# Patient Record
Sex: MALE | Race: WHITE | URBAN - METROPOLITAN AREA
[De-identification: names, ages, dates, MRNs, and addresses within clinical notes are randomized per-mention and may not be internally consistent; named-entity substitution may affect disease eponyms.]

---

## 2015-11-30 LAB
ALT SERPL-CCNC: 10 IU/L (ref 8–45)
AST SERPL-CCNC: 15 IU/L (ref 2–40)
CREAT SERPL-MCNC: 1.07 MG/DL (ref 0.72–1.25)
GFR SERPL CREATININE-BSD FRML MDRD: >60 ML/MIN/1.73M2
GLUCOSE SERPL-MCNC: 97 MG/DL (ref 65–100)
INR PPP: 1
POTASSIUM SERPL-SCNC: 3.8 MMOL/L (ref 3.5–5)

## 2017-01-09 ENCOUNTER — TRANSFERRED RECORDS (OUTPATIENT)
Dept: HEALTH INFORMATION MANAGEMENT | Facility: CLINIC | Age: 82
End: 2017-01-09

## 2017-01-09 LAB
ALT SERPL-CCNC: 9 IU/L (ref 8–45)
AST SERPL-CCNC: 18 IU/L (ref 2–40)
CREAT SERPL-MCNC: 1.15 MG/DL (ref 0.72–1.25)
GFR SERPL CREATININE-BSD FRML MDRD: >60 ML/MIN/1.73M2
GLUCOSE SERPL-MCNC: 101 MG/DL (ref 65–100)
POTASSIUM SERPL-SCNC: 4.4 MMOL/L (ref 3.5–5)

## 2017-05-25 ENCOUNTER — OFFICE VISIT (OUTPATIENT)
Dept: SURGERY | Facility: CLINIC | Age: 82
End: 2017-05-25
Payer: COMMERCIAL

## 2017-05-25 VITALS
BODY MASS INDEX: 20.76 KG/M2 | HEART RATE: 65 BPM | HEIGHT: 70 IN | DIASTOLIC BLOOD PRESSURE: 62 MMHG | SYSTOLIC BLOOD PRESSURE: 119 MMHG | WEIGHT: 145 LBS

## 2017-05-25 DIAGNOSIS — K40.91 UNILATERAL RECURRENT INGUINAL HERNIA WITHOUT OBSTRUCTION OR GANGRENE: Primary | ICD-10-CM

## 2017-05-25 PROCEDURE — 99203 OFFICE O/P NEW LOW 30 MIN: CPT | Performed by: SURGERY

## 2017-05-25 NOTE — LETTER
May 25, 2017      RE:  Joseluis Falcon-:  32    Joseluis Falcon comes to see me today per reference of Dr. Diop.  This delightful active 84-year-old patient underwent successful right  Hernia repair by Dr. Rasmussen on 2015.  He noted shortly thereafter a progressively increasing left inguinal hernia.  This has never been incarcerated but only reduces when he is laying down with his legs elevated.  He does have some discomfort at the site occasionally with no evidence of obstruction.  He's had no problems with the right hernia surgical site.     PMH: Allergies: Seasonal. Protonix  Medications: Prednisone (  For psoriasis) and Otezla  Medical: Degenerative low back pain  GERD with Carlson's  Psoriasis  Some hearing loss  Surgical: Right inguinal hernia with mesh repair 2015  Excision of a skin cancer on his neck.      25-pack-year smoking history quitting in      Lives independently with his wife who is quite frail.  Intentional significant weight loss.     Exam: Talkative  very delightful elderly patient. Normal affect.  Glasses  Blood pressure 119/62.  Pulse 65 regular.  Weight= 65.8 kg  Height=  5 foot 10 inches   BMI= 20.8 kg meters square  No carotid bruits.  Chest= clear  Cardiovascular=RR  Skin= psoriasis changes  No recurrent right inguinal hernia.  Normal descended testicles without masses.  Very large left inguinal hernia that is reducible with some pressure.  Extremities=normal      Impression: Large left inguinal hernia.  This is giving him more symptoms.  I feel that surgical repair with a mesh technique is indicated.  This will be performed our local anesthetic with sedation as an outpatient.  His daughter will be able to drive him home and stay with him postprocedure.  No further question since he is familiar with the surgery from the episode in .        Aden Haro MD

## 2017-05-25 NOTE — PROGRESS NOTES
Joseluis Falcon comes to see me today per reference of Dr. Diop.  This delightful active 84-year-old patient underwent successful right  Hernia repair by Dr. Rasmussen on 12/1/2015.  He noted shortly thereafter a progressively increasing left inguinal hernia.  This has never been incarcerated but only reduces when he is laying down with his legs elevated.  He does have some discomfort at the site occasionally with no evidence of obstruction.  He's had no problems with the right hernia surgical site.      PMH: Allergies: Seasonal. Protonix            Medications: Prednisone (  For psoriasis) and Otezla            Medical: Degenerative low back pain                           GERD with Carlson's                            Psoriasis                            Some hearing loss            Surgical: Right inguinal hernia with mesh repair 12/1/2015                           Excision of a skin cancer on his neck.     25-pack-year smoking history quitting in 1973    Lives independently with his wife who is quite frail.  Intentional significant weight loss.    Exam: Talkative  very delightful elderly patient. Normal affect.  Glasses              Blood pressure 119/62.  Pulse 65 regular.  Weight= 65.8 kg              Height=  5 foot 10 inches   BMI= 20.8 kg meters square              No carotid bruits.  Chest= clear  Cardiovascular=RR              Skin= psoriasis changes              No recurrent right inguinal hernia.  Normal descended testicles without                             Masses.              Very large left inguinal hernia that is reducible with some pressure.              Extremities=normal      Impression: Large left inguinal hernia.  This is giving him more symptoms.  I feel that surgical repair with a mesh technique is indicated.  This will be performed our local anesthetic with sedation as an outpatient.  His daughter will be able to drive him home and stay with him postprocedure.  No further question since he is  familiar with the surgery from the episode in 2015.      Aden Haro MD     Please route or send letter to:  Primary Care Provider (PCP)

## 2017-05-25 NOTE — MR AVS SNAPSHOT
"              After Visit Summary   5/25/2017    Joseluis Falcon    MRN: [id number]           Patient Information     Date Of Birth          7/11/1932        Visit Information        Provider Department      5/25/2017 9:45 AM Aden Haro MD [facility]       Follow-ups after your visit        Your next 10 appointments already scheduled     Jun 09, 2017  2:00 PM CDT   [facility] Same Day Surgery with Adne Haro MD   [facility] ([facility]    [facility] ([facility])   [phone]              Who to contact     If you have questions or need follow up information about today's clinic visit or your schedule please contact [facility] directly at [phone].  Normal or non-critical lab and imaging results will be communicated to you by [employer], letter or phone within 4 business days after the clinic has received the results. If you do not hear from us within 7 days, please contact the clinic through [employer] or phone. If you have a critical or abnormal lab result, we will notify you by phone as soon as possible.  Submit refill requests through Science or call your pharmacy and they will forward the refill request to us. Please allow 3 business days for your refill to be completed.          Additional Information About Your Visit        [employer] Information     Science lets you send messages to your doctor, view your test results, renew your prescriptions, schedule appointments and more. To sign up, go to [email or web address] . Click on \"Log in\" on the left side of the screen, which will take you to the Welcome page. Then click on \"Sign up Now\" on the right side of the page.     You will be asked to enter the access code listed below, as well as some personal information. Please follow the directions to create your username and " "password.     Your access code is: [email or web address]  Expires: 2017 10:10 AM     Your access code will  in 90 days. If you need help or a new code, please call your [facility] or [phone].        Care EveryWhere ID     This is your Care EveryWhere ID. This could be used by other organizations to access your [facility] medical records  [id number]        Your Vitals Were     Pulse Height BMI (Body Mass Index)             65 5' 10\" (1.778 m) 20.81 kg/m2          Blood Pressure from Last 3 Encounters:   17 119/62   12/01/15 106/44   11/23/15 140/83    Weight from Last 3 Encounters:   17 145 lb (65.8 kg)   12/01/15 166 lb (75.3 kg)   11/23/15 177 lb (80.3 kg)              Today, you had the following     No orders found for display       Primary Care Provider Office Phone # Fax #    Danny Diop [phone]       [facility] MANAG[facility] [address]  [address]  [place] [id number]        Thank you!     Thank you for choosing [facility]  for your care. Our goal is always to provide you with excellent care. Hearing back from our patients is one way we can continue to improve our services. Please take a few minutes to complete the written survey that you may receive in the mail after your visit with us. Thank you!             Your Updated Medication List - Protect others around you: Learn how to safely use, store and throw away your medicines at www.disposemymeds.org.          This list is accurate as of: 17 10:10 AM.  Always use your most recent med list.                   Brand Name Dispense Instructions for use    CLARITIN 10 MG tablet   Generic drug:  loratadine      Take 10 mg by mouth as needed       HYDROcodone-acetaminophen 5-325 MG per tablet    NORCO    30 tablet    Take 1-2 tablets by mouth every 4 hours as needed for other (Moderate to Severe Pain)       OTEZLA PO      Take 30 mg by mouth 2 times daily       PREDNISONE PO      Take 10 mg by mouth daily "

## 2017-06-09 ENCOUNTER — APPOINTMENT (OUTPATIENT)
Dept: SURGERY | Facility: PHYSICIAN GROUP | Age: 82
End: 2017-06-09
Payer: COMMERCIAL

## 2017-06-09 ENCOUNTER — HOSPITAL ENCOUNTER (OUTPATIENT)
Facility: CLINIC | Age: 82
Discharge: HOME | End: 2017-06-09
Attending: SURGERY | Admitting: SURGERY
Payer: COMMERCIAL

## 2017-06-09 ENCOUNTER — ANESTHESIA (OUTPATIENT)
Dept: SURGERY | Facility: CLINIC | Age: 82
End: 2017-06-09
Payer: COMMERCIAL

## 2017-06-09 ENCOUNTER — SURGERY (OUTPATIENT)
Age: 82
End: 2017-06-09

## 2017-06-09 ENCOUNTER — ANESTHESIA EVENT (OUTPATIENT)
Dept: SURGERY | Facility: CLINIC | Age: 82
End: 2017-06-09
Payer: COMMERCIAL

## 2017-06-09 VITALS
BODY MASS INDEX: 19.83 KG/M2 | RESPIRATION RATE: 16 BRPM | OXYGEN SATURATION: 96 % | HEIGHT: 70 IN | TEMPERATURE: 95.7 F | DIASTOLIC BLOOD PRESSURE: 62 MMHG | SYSTOLIC BLOOD PRESSURE: 99 MMHG | WEIGHT: 138.5 LBS

## 2017-06-09 DIAGNOSIS — K40.90 LEFT INGUINAL HERNIA: Primary | ICD-10-CM

## 2017-06-09 PROCEDURE — 25000128 H RX IP 250 OP 636: Performed by: SURGERY

## 2017-06-09 PROCEDURE — 25000125 ZZHC RX 250: Performed by: SURGERY

## 2017-06-09 PROCEDURE — 25000125 ZZHC RX 250: Performed by: NURSE ANESTHETIST, CERTIFIED REGISTERED

## 2017-06-09 PROCEDURE — 36000052 ZZH SURGERY LEVEL 2 EA 15 ADDTL MIN: Performed by: SURGERY

## 2017-06-09 PROCEDURE — 40000170 ZZH STATISTIC PRE-PROCEDURE ASSESSMENT II: Performed by: SURGERY

## 2017-06-09 PROCEDURE — 25000128 H RX IP 250 OP 636: Performed by: NURSE ANESTHETIST, CERTIFIED REGISTERED

## 2017-06-09 PROCEDURE — 27210794 ZZH OR GENERAL SUPPLY STERILE: Performed by: SURGERY

## 2017-06-09 PROCEDURE — 71000012 ZZH RECOVERY PHASE 1 LEVEL 1 FIRST HR: Performed by: SURGERY

## 2017-06-09 PROCEDURE — C1781 MESH (IMPLANTABLE): HCPCS | Performed by: SURGERY

## 2017-06-09 PROCEDURE — 36000050 ZZH SURGERY LEVEL 2 1ST 30 MIN: Performed by: SURGERY

## 2017-06-09 PROCEDURE — 49505 PRP I/HERN INIT REDUC >5 YR: CPT | Mod: LT | Performed by: SURGERY

## 2017-06-09 PROCEDURE — 27210995 ZZH RX 272: Performed by: SURGERY

## 2017-06-09 PROCEDURE — 37000008 ZZH ANESTHESIA TECHNICAL FEE, 1ST 30 MIN: Performed by: SURGERY

## 2017-06-09 PROCEDURE — 71000027 ZZH RECOVERY PHASE 2 EACH 15 MINS: Performed by: SURGERY

## 2017-06-09 PROCEDURE — 25000132 ZZH RX MED GY IP 250 OP 250 PS 637: Performed by: SURGERY

## 2017-06-09 PROCEDURE — 37000009 ZZH ANESTHESIA TECHNICAL FEE, EACH ADDTL 15 MIN: Performed by: SURGERY

## 2017-06-09 DEVICE — MESH ULTRAPRO 03X06": Type: IMPLANTABLE DEVICE | Site: GROIN | Status: FUNCTIONAL

## 2017-06-09 RX ORDER — SODIUM CHLORIDE, SODIUM LACTATE, POTASSIUM CHLORIDE, CALCIUM CHLORIDE 600; 310; 30; 20 MG/100ML; MG/100ML; MG/100ML; MG/100ML
INJECTION, SOLUTION INTRAVENOUS CONTINUOUS
Status: DISCONTINUED | OUTPATIENT
Start: 2017-06-09 | End: 2017-06-09 | Stop reason: HOSPADM

## 2017-06-09 RX ORDER — BUPIVACAINE HYDROCHLORIDE AND EPINEPHRINE 5; 5 MG/ML; UG/ML
INJECTION, SOLUTION PERINEURAL PRN
Status: DISCONTINUED | OUTPATIENT
Start: 2017-06-09 | End: 2017-06-09 | Stop reason: HOSPADM

## 2017-06-09 RX ORDER — ALBUTEROL SULFATE 0.83 MG/ML
2.5 SOLUTION RESPIRATORY (INHALATION) EVERY 4 HOURS PRN
Status: DISCONTINUED | OUTPATIENT
Start: 2017-06-09 | End: 2017-06-09 | Stop reason: HOSPADM

## 2017-06-09 RX ORDER — ONDANSETRON 2 MG/ML
4 INJECTION INTRAMUSCULAR; INTRAVENOUS EVERY 30 MIN PRN
Status: DISCONTINUED | OUTPATIENT
Start: 2017-06-09 | End: 2017-06-09 | Stop reason: HOSPADM

## 2017-06-09 RX ORDER — HYDROCODONE BITARTRATE AND ACETAMINOPHEN 5; 325 MG/1; MG/1
1-2 TABLET ORAL EVERY 4 HOURS PRN
Qty: 30 TABLET | Refills: 0 | Status: SHIPPED | OUTPATIENT
Start: 2017-06-09 | End: 2018-02-27

## 2017-06-09 RX ORDER — FENTANYL CITRATE 50 UG/ML
INJECTION, SOLUTION INTRAMUSCULAR; INTRAVENOUS PRN
Status: DISCONTINUED | OUTPATIENT
Start: 2017-06-09 | End: 2017-06-09

## 2017-06-09 RX ORDER — SODIUM CHLORIDE, SODIUM LACTATE, POTASSIUM CHLORIDE, CALCIUM CHLORIDE 600; 310; 30; 20 MG/100ML; MG/100ML; MG/100ML; MG/100ML
INJECTION, SOLUTION INTRAVENOUS CONTINUOUS PRN
Status: DISCONTINUED | OUTPATIENT
Start: 2017-06-09 | End: 2017-06-09

## 2017-06-09 RX ORDER — MAGNESIUM HYDROXIDE 1200 MG/15ML
LIQUID ORAL PRN
Status: DISCONTINUED | OUTPATIENT
Start: 2017-06-09 | End: 2017-06-09 | Stop reason: HOSPADM

## 2017-06-09 RX ORDER — FENTANYL CITRATE 50 UG/ML
25-50 INJECTION, SOLUTION INTRAMUSCULAR; INTRAVENOUS
Status: DISCONTINUED | OUTPATIENT
Start: 2017-06-09 | End: 2017-06-09 | Stop reason: HOSPADM

## 2017-06-09 RX ORDER — OXYCODONE HYDROCHLORIDE 5 MG/1
5-10 TABLET ORAL
Status: COMPLETED | OUTPATIENT
Start: 2017-06-09 | End: 2017-06-09

## 2017-06-09 RX ORDER — PROPOFOL 10 MG/ML
INJECTION, EMULSION INTRAVENOUS PRN
Status: DISCONTINUED | OUTPATIENT
Start: 2017-06-09 | End: 2017-06-09

## 2017-06-09 RX ORDER — HYDROMORPHONE HYDROCHLORIDE 1 MG/ML
.3-.5 INJECTION, SOLUTION INTRAMUSCULAR; INTRAVENOUS; SUBCUTANEOUS EVERY 5 MIN PRN
Status: DISCONTINUED | OUTPATIENT
Start: 2017-06-09 | End: 2017-06-09 | Stop reason: HOSPADM

## 2017-06-09 RX ORDER — DEXAMETHASONE SODIUM PHOSPHATE 4 MG/ML
INJECTION, SOLUTION INTRA-ARTICULAR; INTRALESIONAL; INTRAMUSCULAR; INTRAVENOUS; SOFT TISSUE PRN
Status: DISCONTINUED | OUTPATIENT
Start: 2017-06-09 | End: 2017-06-09

## 2017-06-09 RX ORDER — PROPOFOL 10 MG/ML
INJECTION, EMULSION INTRAVENOUS CONTINUOUS PRN
Status: DISCONTINUED | OUTPATIENT
Start: 2017-06-09 | End: 2017-06-09

## 2017-06-09 RX ORDER — ONDANSETRON 2 MG/ML
INJECTION INTRAMUSCULAR; INTRAVENOUS PRN
Status: DISCONTINUED | OUTPATIENT
Start: 2017-06-09 | End: 2017-06-09

## 2017-06-09 RX ORDER — LIDOCAINE HYDROCHLORIDE 20 MG/ML
INJECTION, SOLUTION INFILTRATION; PERINEURAL PRN
Status: DISCONTINUED | OUTPATIENT
Start: 2017-06-09 | End: 2017-06-09

## 2017-06-09 RX ORDER — HYDRALAZINE HYDROCHLORIDE 20 MG/ML
2.5-5 INJECTION INTRAMUSCULAR; INTRAVENOUS EVERY 10 MIN PRN
Status: DISCONTINUED | OUTPATIENT
Start: 2017-06-09 | End: 2017-06-09 | Stop reason: HOSPADM

## 2017-06-09 RX ORDER — LABETALOL HYDROCHLORIDE 5 MG/ML
10 INJECTION, SOLUTION INTRAVENOUS
Status: DISCONTINUED | OUTPATIENT
Start: 2017-06-09 | End: 2017-06-09 | Stop reason: HOSPADM

## 2017-06-09 RX ORDER — CEFAZOLIN SODIUM 1 G/3ML
1 INJECTION, POWDER, FOR SOLUTION INTRAMUSCULAR; INTRAVENOUS SEE ADMIN INSTRUCTIONS
Status: DISCONTINUED | OUTPATIENT
Start: 2017-06-09 | End: 2017-06-09 | Stop reason: HOSPADM

## 2017-06-09 RX ORDER — EPHEDRINE SULFATE 50 MG/ML
INJECTION, SOLUTION INTRAMUSCULAR; INTRAVENOUS; SUBCUTANEOUS PRN
Status: DISCONTINUED | OUTPATIENT
Start: 2017-06-09 | End: 2017-06-09

## 2017-06-09 RX ORDER — ONDANSETRON 4 MG/1
4 TABLET, ORALLY DISINTEGRATING ORAL EVERY 30 MIN PRN
Status: DISCONTINUED | OUTPATIENT
Start: 2017-06-09 | End: 2017-06-09 | Stop reason: HOSPADM

## 2017-06-09 RX ORDER — ACETAMINOPHEN 325 MG/1
650 TABLET ORAL
Status: DISCONTINUED | OUTPATIENT
Start: 2017-06-09 | End: 2017-06-09 | Stop reason: HOSPADM

## 2017-06-09 RX ORDER — CEFAZOLIN SODIUM 2 G/100ML
2 INJECTION, SOLUTION INTRAVENOUS
Status: COMPLETED | OUTPATIENT
Start: 2017-06-09 | End: 2017-06-09

## 2017-06-09 RX ADMIN — DEXMEDETOMIDINE HYDROCHLORIDE 4 MCG: 100 INJECTION, SOLUTION INTRAVENOUS at 18:28

## 2017-06-09 RX ADMIN — OXYCODONE HYDROCHLORIDE 5 MG: 5 TABLET ORAL at 20:00

## 2017-06-09 RX ADMIN — FENTANYL CITRATE 25 MCG: 50 INJECTION, SOLUTION INTRAMUSCULAR; INTRAVENOUS at 18:30

## 2017-06-09 RX ADMIN — FENTANYL CITRATE 50 MCG: 50 INJECTION, SOLUTION INTRAMUSCULAR; INTRAVENOUS at 17:25

## 2017-06-09 RX ADMIN — DEXAMETHASONE SODIUM PHOSPHATE 4 MG: 4 INJECTION, SOLUTION INTRA-ARTICULAR; INTRALESIONAL; INTRAMUSCULAR; INTRAVENOUS; SOFT TISSUE at 17:39

## 2017-06-09 RX ADMIN — ONDANSETRON 4 MG: 2 INJECTION INTRAMUSCULAR; INTRAVENOUS at 17:39

## 2017-06-09 RX ADMIN — Medication 5 MG: at 18:40

## 2017-06-09 RX ADMIN — SODIUM CHLORIDE, POTASSIUM CHLORIDE, SODIUM LACTATE AND CALCIUM CHLORIDE: 600; 310; 30; 20 INJECTION, SOLUTION INTRAVENOUS at 19:15

## 2017-06-09 RX ADMIN — SODIUM CHLORIDE, POTASSIUM CHLORIDE, SODIUM LACTATE AND CALCIUM CHLORIDE: 600; 310; 30; 20 INJECTION, SOLUTION INTRAVENOUS at 17:24

## 2017-06-09 RX ADMIN — PROPOFOL 20 MG: 10 INJECTION, EMULSION INTRAVENOUS at 17:27

## 2017-06-09 RX ADMIN — SODIUM BICARBONATE 22 ML: 84 INJECTION, SOLUTION INTRAVENOUS at 18:27

## 2017-06-09 RX ADMIN — Medication 5 MG: at 17:39

## 2017-06-09 RX ADMIN — DEXMEDETOMIDINE HYDROCHLORIDE 12 MCG: 100 INJECTION, SOLUTION INTRAVENOUS at 17:25

## 2017-06-09 RX ADMIN — FENTANYL CITRATE 25 MCG: 50 INJECTION, SOLUTION INTRAMUSCULAR; INTRAVENOUS at 17:48

## 2017-06-09 RX ADMIN — LIDOCAINE HYDROCHLORIDE 40 MG: 20 INJECTION, SOLUTION INFILTRATION; PERINEURAL at 17:25

## 2017-06-09 RX ADMIN — PROPOFOL 100 MCG/KG/MIN: 10 INJECTION, EMULSION INTRAVENOUS at 17:25

## 2017-06-09 RX ADMIN — BUPIVACAINE HYDROCHLORIDE AND EPINEPHRINE BITARTRATE 30 ML: 5; .005 INJECTION, SOLUTION PERINEURAL at 19:06

## 2017-06-09 RX ADMIN — SODIUM CHLORIDE 1000 ML: 0.9 IRRIGANT IRRIGATION at 17:38

## 2017-06-09 RX ADMIN — CEFAZOLIN SODIUM 2 G: 2 INJECTION, SOLUTION INTRAVENOUS at 17:28

## 2017-06-09 ASSESSMENT — COPD QUESTIONNAIRES: COPD: 0

## 2017-06-09 ASSESSMENT — ENCOUNTER SYMPTOMS
DYSRHYTHMIAS: 0
SEIZURES: 0

## 2017-06-09 ASSESSMENT — LIFESTYLE VARIABLES: TOBACCO_USE: 0

## 2017-06-09 NOTE — IP AVS SNAPSHOT
[place] [address]    [place] [address]    Phone:  [phone]                                       After Visit Summary   6/9/2017    Joseluis Falcon    MRN: [id number]           After Visit Summary Signature Page     I have received my discharge instructions, and my questions have been answered. I have discussed any challenges I see with this plan with the nurse or doctor.    ..........................................................................................................................................  Patient/Patient Representative Signature      ..........................................................................................................................................  Patient Representative Print Name and Relationship to Patient    ..................................................               ................................................  Date                                            Time    ..........................................................................................................................................  Reviewed by Signature/Title    ...................................................              ..............................................  Date                                                            Time

## 2017-06-09 NOTE — ANESTHESIA PREPROCEDURE EVALUATION
Procedure: Procedure(s):  HERNIORRHAPHY INGUINAL  Preop diagnosis: LEFT INGUINAL HERNIA     Allergies   Allergen Reactions     Seasonal Allergies      Protonix [Pantoprazole] Rash     Past Medical History:   Diagnosis Date     Arthritis      Carlson's esophagus      GERD (gastroesophageal reflux disease)      Hard of hearing      Psoriasis      Skin cancer      Past Surgical History:   Procedure Laterality Date     HERNIORRHAPHY INGUINAL Right 12/1/2015    Procedure: HERNIORRHAPHY INGUINAL;  Surgeon: Malcom Rasmussen MD;  [facility]     Prior to Admission medications    Medication Sig Start Date End Date Taking? Authorizing Provider   Apremilast (OTEZLA PO) Take 30 mg by mouth 2 times daily    Reported, Patient   HYDROcodone-acetaminophen (NORCO) 5-325 MG per tablet Take 1-2 tablets by mouth every 4 hours as needed for other (Moderate to Severe Pain)  Patient not taking: Reported on 5/25/2017 12/1/15   Bryan Griffin, CARMELINA   loratadine (CLARITIN) 10 MG tablet Take 10 mg by mouth as needed    Reported, Patient   PREDNISONE PO Take 10 mg by mouth daily     Reported, Patient     No current Epic-ordered facility-administered medications on file.      Current Outpatient Prescriptions Ordered in [facility]   Medication     Apremilast (OTEZLA PO)     HYDROcodone-acetaminophen (NORCO) 5-325 MG per tablet     loratadine (CLARITIN) 10 MG tablet     PREDNISONE PO     Wt Readings from Last 1 Encounters:   05/25/17 65.8 kg (145 lb)     Temp Readings from Last 1 Encounters:   12/01/15 36.8  C (98.2  F) (Temporal)     BP Readings from Last 6 Encounters:   05/25/17 119/62   12/01/15 106/44   11/23/15 140/83   10/31/14 106/70   02/03/14 110/60     Pulse Readings from Last 4 Encounters:   05/25/17 65   11/23/15 69   10/31/14 56     Resp Readings from Last 1 Encounters:   12/01/15 16     SpO2 Readings from Last 1 Encounters:   12/01/15 93%       RECENT LABS:  hgb 13.8, plt 132, k 4.3, cr 1.10, gluc 89  ECG: sinus mitchel, RBBB, no  st or twave changes          Anesthesia Evaluation     . Pt has had prior anesthetic. 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 or on medication    (+) GERD      (-) liver disease   Renal/Genitourinary:      (-) renal disease   Endo:      (-) Type I DM, Type II DM and thyroid disease   Psychiatric:         Infectious Disease:        (-) Recent Fever   Malignancy:         Other:                     Physical Exam  Normal systems: cardiovascular and pulmonary    Airway   Mallampati: II  TM distance: >3 FB  Neck ROM: full    Dental   (+) missing and chipped    Cardiovascular   Rhythm and rate: regular and normal  (-) no murmur    Pulmonary    breath sounds clear to auscultation                    Anesthesia Plan      History & Physical Review      ASA Status:  3 .    NPO Status:  > 8 hours    Plan for MAC with Propofol induction. Reason for MAC:  Deep or markedly invasive procedure (G8)  PONV prophylaxis:  Ondansetron (or other 5HT-3)  Propofol infusion      Postoperative Care  Postoperative pain management:  IV analgesics and Oral pain medications.      Consents  Anesthetic plan, risks, benefits and alternatives discussed with:  Patient..                          .

## 2017-06-09 NOTE — IP AVS SNAPSHOT
MRN:[id number]                      After Visit Summary   6/9/2017    Joseluis Falcon    MRN: [id number]           Thank you!     Thank you for choosing [facility] for your care. Our goal is always to provide you with excellent care. Hearing back from our patients is one way we can continue to improve our services. Please take a few minutes to complete the written survey that you may receive in the mail after you visit with us. Thank you!        Patient Information     Date Of Birth          7/11/1932        About your hospital stay     You were admitted on:  June 9, 2017 You last received care in the:  [facility] PACU    You were discharged on:  June 9, 2017       Who to Call     For medical emergencies, please call 911.  For non-urgent questions about your medical care, please call your primary care provider or clinic, [phone]  For questions related to your surgery, please call your surgery clinic        Attending Provider     Provider Aden Donis MD Surgery       Primary Care Provider Office Phone # Fax #    Danny Diop [phone]      After Care Instructions     Diet Instructions       May resume pre-procedure diet            Discharge Instructions       Follow up with Dr. Haro in about 2 weeks.            Dressing       Keep dressing clean and dry.  The surgical dressing may be removed two days after surgery. Gauze/tape or larger Band-aid may be applied for your comfort.            Ice to affected area       May apply ice to operative site as needed for comfort; alternating 10 minutes on/off.            Shower       Ok to shower two days after surgery. Avoid submersion of the incision (bath, hot tub, pool) for two weeks after surgery.            Weight bearing status - As tolerated       No lifting restrictions. Listen to your body, if it hurts don't do it for a few days.                  Further instructions from your care team        Same Day Surgery Discharge Instructions for  Sedation and General Anesthesia       It's not unusual to feel dizzy, light-headed or faint for up to 24 hours after surgery or while taking pain medication.  If you have these symptoms: sit for a few minutes before standing and have someone assist you when you get up to walk or use the bathroom.      You should rest and relax for the next 24 hours. We recommend you make arrangements to have an adult stay with you for at least 24 hours after your discharge.  Avoid hazardous and strenuous activity.      DO NOT DRIVE any vehicle or operate mechanical equipment for 24 hours following the end of your surgery.  Even though you may feel normal, your reactions may be affected by the medication you have received.      Do not drink alcoholic beverages for 24 hours following surgery.       Slowly progress to your regular diet as you feel able. It's not unusual to feel nauseated and/or vomit after receiving anesthesia.  If you develop these symptoms, drink clear liquids (apple juice, ginger ale, broth, 7-up, etc. ) until you feel better.  If your nausea and vomiting persists for 24 hours, please notify your surgeon.        All narcotic pain medications, along with inactivity and anesthesia, can cause constipation. Drinking plenty of liquids and increasing fiber intake will help.      For any questions of a medical nature, call your surgeon.      Do not make important decisions for 24 hours.      If you had general anesthesia, you may have a sore throat for a couple of days related to the breathing tube used during surgery.  You may use Cepacol lozenges to help with this discomfort.  If it worsens or if you develop a fever, contact your surgeon.       If you feel your pain is not well managed with the pain medications prescribed by your surgeon, please contact your surgeon's office to let them know so they can address your concerns.     [facility]   Discharge  Instructions: Post-Operative Hernia  [facility]  DIET    No restrictions.      Suggest plenty of liquids and high fiber foods to keep stools soft.      May take 1 oz. (2 tablespoons) Milk of Magnesia the evening following surgery to encourage bowel movement.    BANDAGE    Take the bandage off 48 hours after surgery.      If you have tape strips leave them on.  If they become loose, take them off.      Apply ice to groin periodically during the first 48 hours after surgery.    SHOWER    You may shower after the bandage is off.  Pat the incision dry.    ACTIVITY    You may do what is comfortable.    It is not wise to lift weights, or do anything that requires maximal physical effort for several weeks.      Individual restrictions should be discussed with your surgeon.    You may drive when you are comfortable enough to drive safely.  (Usually 3-4 days.)    WORK      You may return to non-physical work whenever you are comfortable.  (If you can drive, you probably can work.)  Physical work will be decided individually.    PAIN    You may have post-operative pain for several days.    Use the pain medication as directed at your discretion.    Expect gradual resolution of pain over several days.    If your hernia was repaired by laparoscopy, you may develop shoulder pain.  This shoulder pain may be present immediately or may not occur for 24 hours. Some shoulder pain may persist though it should begin declining around the 48 hour tahmina. Tips for coping include: applying a heating pad to the affected shoulder, lying flat or on your side, use of post-operative analgesia, and ambulating.  Contact your surgeon if the pain becomes intolerable or persistent beyond a few days.    EXPECTATIONS    You can expect: some swelling; black and blue areas possibly involving the testicles and penis; some numbness.  These are not dangerous.    RETURN APPOINTMENT    Please make your post-operative appointment for 10-15 days  "after surgery.      We recommend making this appointment soon after your surgery date has been confirmed.    CALL OUR CLINICAL OFFICE AT [phone] IF YOU HAVE:    Fever or chills    Drainage from the incision(s)    Significant bleeding    Increasing pain after the first 36 hours    Any other concerns       **If you have questions or concerns about your procedure  call Dr Aden Haro at [phone]**      One tablet of Oxycodone 5 mg given at 8:00 PM        Pending Results     No orders found from 2017 to 6/10/2017.            Admission Information     Date & Time Provider Department Dept. Phone    2017 Aden Haro MD [facility] PACU [phone]      Your Vitals Were     Blood Pressure Temperature Respirations Height Weight Pulse Oximetry    102/62 95.7  F (35.4  C) 16 1.778 m (5' 10\") 62.8 kg (138 lb 8 oz) 95%    BMI (Body Mass Index)                   19.87 kg/m2           [facility] [employer] Information     [employer] lets you send messages to your doctor, view your test results, renew your prescriptions, schedule appointments and more. To sign up, go to [email or web address] . Click on \"Log in\" on the left side of the screen, which will take you to the Welcome page. Then click on \"Sign up Now\" on the right side of the page.     You will be asked to enter the access code listed below, as well as some personal information. Please follow the directions to create your username and password.     Your access code is: [email or web address]  Expires: 2017 10:10 AM     Your access code will  in 90 days. If you need help or a new code, please call your [facility] clinic or [phone].        Care EveryWhere ID     This is your Care EveryWhere ID. This could be used by other organizations to access your [facility] medical records  [id number]           Review of your medicines      CONTINUE these medicines which have NOT CHANGED        Dose / Directions    CLARITIN 10 MG tablet   Generic drug:  " loratadine        Dose:  10 mg   Take 10 mg by mouth as needed   Refills:  0       HYDROcodone-acetaminophen 5-325 MG per tablet   Commonly known as:  NORCO   Used for:  Left inguinal hernia        Dose:  1-2 tablet   Take 1-2 tablets by mouth every 4 hours as needed for other (Moderate to Severe Pain)   Quantity:  30 tablet   Refills:  0       OTEZLA PO        Dose:  30 mg   Take 30 mg by mouth 2 times daily   Refills:  0       PREDNISONE PO        Dose:  10 mg   Take 10 mg by mouth daily   Refills:  0            Where to get your medicines      Some of these will need a paper prescription and others can be bought over the counter. Ask your nurse if you have questions.     Bring a paper prescription for each of these medications     HYDROcodone-acetaminophen 5-325 MG per tablet                Protect others around you: Learn how to safely use, store and throw away your medicines at www.disposemymeds.org.             Medication List: This is a list of all your medications and when to take them. Check marks below indicate your daily home schedule. Keep this list as a reference.      Medications           Morning Afternoon Evening Bedtime As Needed    CLARITIN 10 MG tablet   Take 10 mg by mouth as needed   Generic drug:  loratadine                                HYDROcodone-acetaminophen 5-325 MG per tablet   Commonly known as:  NORCO   Take 1-2 tablets by mouth every 4 hours as needed for other (Moderate to Severe Pain)                                OTEZLA PO   Take 30 mg by mouth 2 times daily                                PREDNISONE PO   Take 10 mg by mouth daily

## 2017-06-10 NOTE — ANESTHESIA POSTPROCEDURE EVALUATION
Patient: Joseluis Falcon    Procedure(s):  OPEN LEFT INGUINAL HERNIA REPAIR WITH MESH  - Wound Class: I-Clean    Diagnosis:LEFT INGUINAL HERNIA   Diagnosis Additional Information: No value filed.    Anesthesia Type:  MAC    Note:  Anesthesia Post Evaluation    Patient location during evaluation: PACU  Patient participation: Able to fully participate in evaluation  Level of consciousness: awake and alert  Pain management: adequate  Airway patency: patent  Cardiovascular status: acceptable, blood pressure returned to baseline and hemodynamically stable  Respiratory status: acceptable  Hydration status: acceptable  PONV: none     Anesthetic complications: None          Last vitals:  Vitals:    06/09/17 1950 06/09/17 2000 06/09/17 2029   BP: 105/58 102/62 99/62   Resp: 16 16 16   Temp: 35.5  C (95.9  F) 35.4  C (95.7  F)    SpO2: 98% 95% 96%         Electronically Signed By: Radha Domínguez MD  June 9, 2017  9:26 PM

## 2017-06-10 NOTE — DISCHARGE INSTRUCTIONS
Same Day Surgery Discharge Instructions for  Sedation and General Anesthesia       It's not unusual to feel dizzy, light-headed or faint for up to 24 hours after surgery or while taking pain medication.  If you have these symptoms: sit for a few minutes before standing and have someone assist you when you get up to walk or use the bathroom.      You should rest and relax for the next 24 hours. We recommend you make arrangements to have an adult stay with you for at least 24 hours after your discharge.  Avoid hazardous and strenuous activity.      DO NOT DRIVE any vehicle or operate mechanical equipment for 24 hours following the end of your surgery.  Even though you may feel normal, your reactions may be affected by the medication you have received.      Do not drink alcoholic beverages for 24 hours following surgery.       Slowly progress to your regular diet as you feel able. It's not unusual to feel nauseated and/or vomit after receiving anesthesia.  If you develop these symptoms, drink clear liquids (apple juice, ginger ale, broth, 7-up, etc. ) until you feel better.  If your nausea and vomiting persists for 24 hours, please notify your surgeon.        All narcotic pain medications, along with inactivity and anesthesia, can cause constipation. Drinking plenty of liquids and increasing fiber intake will help.      For any questions of a medical nature, call your surgeon.      Do not make important decisions for 24 hours.      If you had general anesthesia, you may have a sore throat for a couple of days related to the breathing tube used during surgery.  You may use Cepacol lozenges to help with this discomfort.  If it worsens or if you develop a fever, contact your surgeon.       If you feel your pain is not well managed with the pain medications prescribed by your surgeon, please contact your surgeon's office to let them know so they can address your concerns.     [facility]   Discharge  Instructions: Post-Operative Hernia  [facility]  DIET    No restrictions.      Suggest plenty of liquids and high fiber foods to keep stools soft.      May take 1 oz. (2 tablespoons) Milk of Magnesia the evening following surgery to encourage bowel movement.    BANDAGE    Take the bandage off 48 hours after surgery.      If you have tape strips leave them on.  If they become loose, take them off.      Apply ice to groin periodically during the first 48 hours after surgery.    SHOWER    You may shower after the bandage is off.  Pat the incision dry.    ACTIVITY    You may do what is comfortable.    It is not wise to lift weights, or do anything that requires maximal physical effort for several weeks.      Individual restrictions should be discussed with your surgeon.    You may drive when you are comfortable enough to drive safely.  (Usually 3-4 days.)    WORK      You may return to non-physical work whenever you are comfortable.  (If you can drive, you probably can work.)  Physical work will be decided individually.    PAIN    You may have post-operative pain for several days.    Use the pain medication as directed at your discretion.    Expect gradual resolution of pain over several days.    If your hernia was repaired by laparoscopy, you may develop shoulder pain.  This shoulder pain may be present immediately or may not occur for 24 hours. Some shoulder pain may persist though it should begin declining around the 48 hour tahmina. Tips for coping include: applying a heating pad to the affected shoulder, lying flat or on your side, use of post-operative analgesia, and ambulating.  Contact your surgeon if the pain becomes intolerable or persistent beyond a few days.    EXPECTATIONS    You can expect: some swelling; black and blue areas possibly involving the testicles and penis; some numbness.  These are not dangerous.    RETURN APPOINTMENT    Please make your post-operative appointment for 10-15 days  after surgery.      We recommend making this appointment soon after your surgery date has been confirmed.    CALL OUR CLINICAL OFFICE AT [phone] IF YOU HAVE:    Fever or chills    Drainage from the incision(s)    Significant bleeding    Increasing pain after the first 36 hours    Any other concerns       **If you have questions or concerns about your procedure  call Dr Aden Haro at [phone]**      One tablet of Oxycodone 5 mg given at 8:00 PM

## 2017-06-10 NOTE — ANESTHESIA CARE TRANSFER NOTE
Patient: Joseluis Falcon    Procedure(s):  OPEN LEFT INGUINAL HERNIA REPAIR WITH MESH  - Wound Class: I-Clean    Diagnosis: LEFT INGUINAL HERNIA   Diagnosis Additional Information: No value filed.    Anesthesia Type:   MAC     Note:  Airway :Room Air  Patient transferred to:PACU  Comments:   Transferred to  PACU RN. Patient awake and verbal. Spontaneous resp and on room air. Monitors and alarms on. VSS. Report given.      Vitals: (Last set prior to Anesthesia Care Transfer)    CRNA VITALS  6/9/2017 1849 - 6/9/2017 1926      6/9/2017             Resp Rate (set): 10                Electronically Signed By: JASMINA Kelsey CRNA  June 9, 2017  7:26 PM

## 2017-06-10 NOTE — OP NOTE
DATE OF PROCEDURE:  06/09/2017      PREOPERATIVE DIAGNOSIS:  Symptomatic left inguinal hernia.      POSTOPERATIVE DIAGNOSIS:  Symptomatic left inguinal hernia.      PROCEDURE:  Left inguinal herniorrhaphy with onlay mesh repair.      SURGEON:  Aden Haro MD      ASSISTANT:  YOSELIN BARRIGA MD ([facility] surgery resident)      ANESTHESIA:  Local with intravenous supplementation.      PREOPERATIVE MEDICATIONS:  Ancef 2 grams IV.      INDICATIONS:  Joseluis Falcon is an 84-year-old patient who has noticed a progressively increasing more symptomatic left inguinal hernia.  This is easily reducible.  It was felt that he would benefit from surgical repair.      DESCRIPTION OF PROCEDURE:  The patient was brought to Same Day Surgery.  He is extremely thin.  The hernia was reduced with him lying down.  Calf pneumatic compression boots were used, and a pillow was placed under the knees.  The abdomen and groin were prepped and draped in the usual fashion.  A timeout was called and the sites were identified.      1% lidocaine with bicarbonate was injected in a field block fashion.  A transverse inguinal incision was made.  Dissection was carried down through minimal adipose tissue.  He had a well-defined Kim's fascia that was divided.  We dissected down to the external oblique and this was split in the direction of its fibers to the internal ring.  Cord contents were easily mobilized and encircled with a Penrose drain.  As we dissected toward the internal ring he was found to have a large hernia sac.  The lipoma of the cord was dissected free and clamped at its base ligated with 3-0 Vicryl suture and divided and allowed to retract.  Cord contents referred off the hernia sac.  This was opened and with this being very redundant we pulled up some attached sigmoid colon to the peritoneum.  The sac was closed with 3-0 Vicryl.  This was then reduced into the internal ring.  There was no direct inguinal hernia component.  We tightened the  internal ring around the cord with several 3-0 Vicryl sutures to help with the reduction.      An UltraPro mesh was selected and trimmed to the appropriate length, cutting lateral wings for the cord contents.  We had a well-defined Poupart's ligament and muscle of the internal oblique fascia.  The mesh was sewn to this with running 3-0 Prolene suture, leaving redundant mesh over the cephalic portion.  The lateral aspects of the mesh were attached to the lateral portion of the internal ring, again with running 3-0 Prolene suture.  We had good identification of the ilioinguinal nerve.  This was mobilized and ensured that not incorporated within the mesh.      Abdominal contents returned to their normal anatomical position.  The external oblique fascia was closed with running 3-0 Vicryl as was the Kim's fascia.  Subcutaneous tissue was closed with interrupted 3-0 Vicryl and the skin was closed with 4-0 Monocryl in subcuticular fashion.  Wound was infiltrated with 0.5% Marcaine 1:200,000 epinephrine for postop analgesia.  Steri-Strips, gauze dressings were applied.      The patient tolerated the procedure well.  Estimated blood loss less than 10 mL.      COMPLICATIONS:  None.         ADEN SAUL MD             D: 2017 19:14   T: 06/10/2017 11:33   MT: [id number]      Name:     ISRAEL PERDOMO   MRN:      [id number]        Account:        [id number]   :      1932           Procedure Date: 2017      Document: [id number]       cc: Aden Saul MD

## 2017-06-10 NOTE — BRIEF OP NOTE
[facility] Brief Operative Note    Pre-operative diagnosis: LEFT INGUINAL HERNIA    Post-operative diagnosis Same   Procedure: Procedure(s):  OPEN LEFT INGUINAL HERNIA REPAIR WITH MESH  - Wound Class: I-Clean   Surgeon(s): Surgeon(s) and Role:     * Aden Haro MD - Primary     * Savanna Parikh MD - Resident - Assisting   Estimated blood loss: 10 mL    Specimens: None   Findings: Large indirect hernia

## 2018-02-27 ENCOUNTER — HOSPITAL ENCOUNTER (INPATIENT)
Facility: CLINIC | Age: 83
LOS: 28 days | Discharge: NURSING, REHAB OR LONG-TERM CARE | DRG: 329 | End: 2018-03-27
Attending: EMERGENCY MEDICINE | Admitting: HOSPITALIST
Payer: COMMERCIAL

## 2018-02-27 ENCOUNTER — APPOINTMENT (OUTPATIENT)
Dept: CT IMAGING | Facility: CLINIC | Age: 83
DRG: 329 | End: 2018-02-27
Attending: EMERGENCY MEDICINE
Payer: COMMERCIAL

## 2018-02-27 DIAGNOSIS — K56.609 SMALL BOWEL OBSTRUCTION (H): ICD-10-CM

## 2018-02-27 DIAGNOSIS — I48.91 ATRIAL FIBRILLATION, UNSPECIFIED TYPE (H): Primary | ICD-10-CM

## 2018-02-27 DIAGNOSIS — R45.1 AGITATION: ICD-10-CM

## 2018-02-27 DIAGNOSIS — R10.84 ABDOMINAL PAIN, GENERALIZED: ICD-10-CM

## 2018-02-27 LAB
ABO + RH BLD: NORMAL
ABO + RH BLD: NORMAL
ALBUMIN SERPL-MCNC: 3.7 G/DL (ref 3.4–5)
ALP SERPL-CCNC: 97 U/L (ref 40–150)
ALT SERPL W P-5'-P-CCNC: 16 U/L (ref 0–70)
ANION GAP SERPL CALCULATED.3IONS-SCNC: 8 MMOL/L (ref 3–14)
AST SERPL W P-5'-P-CCNC: 13 U/L (ref 0–45)
BASOPHILS # BLD AUTO: 0 10E9/L (ref 0–0.2)
BASOPHILS NFR BLD AUTO: 0.1 %
BILIRUB SERPL-MCNC: 1.3 MG/DL (ref 0.2–1.3)
BLD GP AB SCN SERPL QL: NORMAL
BLOOD BANK CMNT PATIENT-IMP: NORMAL
BUN SERPL-MCNC: 25 MG/DL (ref 7–30)
CALCIUM SERPL-MCNC: 8.9 MG/DL (ref 8.5–10.1)
CHLORIDE SERPL-SCNC: 100 MMOL/L (ref 94–109)
CO2 SERPL-SCNC: 31 MMOL/L (ref 20–32)
CREAT SERPL-MCNC: 1.21 MG/DL (ref 0.66–1.25)
DIFFERENTIAL METHOD BLD: ABNORMAL
EOSINOPHIL # BLD AUTO: 0 10E9/L (ref 0–0.7)
EOSINOPHIL NFR BLD AUTO: 0.1 %
ERYTHROCYTE [DISTWIDTH] IN BLOOD BY AUTOMATED COUNT: 12.8 % (ref 10–15)
GFR SERPL CREATININE-BSD FRML MDRD: 57 ML/MIN/1.7M2
GLUCOSE SERPL-MCNC: 168 MG/DL (ref 70–99)
HCT VFR BLD AUTO: 45.1 % (ref 40–53)
HEMOCCULT STL QL: NEGATIVE
HGB BLD-MCNC: 15.4 G/DL (ref 13.3–17.7)
IMM GRANULOCYTES # BLD: 0 10E9/L (ref 0–0.4)
IMM GRANULOCYTES NFR BLD: 0.2 %
INR PPP: 1.02 (ref 0.86–1.14)
INTERPRETATION ECG - MUSE: NORMAL
LIPASE SERPL-CCNC: 92 U/L (ref 73–393)
LYMPHOCYTES # BLD AUTO: 0.7 10E9/L (ref 0.8–5.3)
LYMPHOCYTES NFR BLD AUTO: 5.2 %
MCH RBC QN AUTO: 33.3 PG (ref 26.5–33)
MCHC RBC AUTO-ENTMCNC: 34.1 G/DL (ref 31.5–36.5)
MCV RBC AUTO: 98 FL (ref 78–100)
MONOCYTES # BLD AUTO: 0.9 10E9/L (ref 0–1.3)
MONOCYTES NFR BLD AUTO: 6.8 %
NEUTROPHILS # BLD AUTO: 11 10E9/L (ref 1.6–8.3)
NEUTROPHILS NFR BLD AUTO: 87.6 %
NRBC # BLD AUTO: 0 10*3/UL
NRBC BLD AUTO-RTO: 0 /100
PLATELET # BLD AUTO: 154 10E9/L (ref 150–450)
POTASSIUM SERPL-SCNC: 3.6 MMOL/L (ref 3.4–5.3)
PROT SERPL-MCNC: 7.1 G/DL (ref 6.8–8.8)
RBC # BLD AUTO: 4.62 10E12/L (ref 4.4–5.9)
SODIUM SERPL-SCNC: 139 MMOL/L (ref 133–144)
SPECIMEN EXP DATE BLD: NORMAL
TROPONIN I SERPL-MCNC: <0.015 UG/L (ref 0–0.04)
WBC # BLD AUTO: 12.6 10E9/L (ref 4–11)

## 2018-02-27 PROCEDURE — 99222 1ST HOSP IP/OBS MODERATE 55: CPT | Mod: AI | Performed by: HOSPITALIST

## 2018-02-27 PROCEDURE — 99213 OFFICE O/P EST LOW 20 MIN: CPT | Performed by: SURGERY

## 2018-02-27 PROCEDURE — 86850 RBC ANTIBODY SCREEN: CPT | Performed by: EMERGENCY MEDICINE

## 2018-02-27 PROCEDURE — 82272 OCCULT BLD FECES 1-3 TESTS: CPT | Performed by: EMERGENCY MEDICINE

## 2018-02-27 PROCEDURE — 96361 HYDRATE IV INFUSION ADD-ON: CPT

## 2018-02-27 PROCEDURE — 96375 TX/PRO/DX INJ NEW DRUG ADDON: CPT

## 2018-02-27 PROCEDURE — 96376 TX/PRO/DX INJ SAME DRUG ADON: CPT

## 2018-02-27 PROCEDURE — 99207 ZZC CDG-CHARGE REQUIRED MANUAL ENTRY: CPT | Performed by: HOSPITALIST

## 2018-02-27 PROCEDURE — 83690 ASSAY OF LIPASE: CPT | Performed by: EMERGENCY MEDICINE

## 2018-02-27 PROCEDURE — 84484 ASSAY OF TROPONIN QUANT: CPT | Performed by: EMERGENCY MEDICINE

## 2018-02-27 PROCEDURE — 80053 COMPREHEN METABOLIC PANEL: CPT | Performed by: EMERGENCY MEDICINE

## 2018-02-27 PROCEDURE — 86900 BLOOD TYPING SEROLOGIC ABO: CPT | Performed by: EMERGENCY MEDICINE

## 2018-02-27 PROCEDURE — 12000000 ZZH R&B MED SURG/OB

## 2018-02-27 PROCEDURE — 85610 PROTHROMBIN TIME: CPT | Performed by: EMERGENCY MEDICINE

## 2018-02-27 PROCEDURE — 99223 1ST HOSP IP/OBS HIGH 75: CPT | Mod: AI | Performed by: HOSPITALIST

## 2018-02-27 PROCEDURE — 99285 EMERGENCY DEPT VISIT HI MDM: CPT | Mod: 25

## 2018-02-27 PROCEDURE — 25800025 ZZH RX 258: Performed by: HOSPITALIST

## 2018-02-27 PROCEDURE — 74177 CT ABD & PELVIS W/CONTRAST: CPT

## 2018-02-27 PROCEDURE — 25000128 H RX IP 250 OP 636: Performed by: EMERGENCY MEDICINE

## 2018-02-27 PROCEDURE — 86901 BLOOD TYPING SEROLOGIC RH(D): CPT | Performed by: EMERGENCY MEDICINE

## 2018-02-27 PROCEDURE — 25000125 ZZHC RX 250: Performed by: EMERGENCY MEDICINE

## 2018-02-27 PROCEDURE — 93005 ELECTROCARDIOGRAM TRACING: CPT

## 2018-02-27 PROCEDURE — 85025 COMPLETE CBC W/AUTO DIFF WBC: CPT | Performed by: EMERGENCY MEDICINE

## 2018-02-27 PROCEDURE — 25000128 H RX IP 250 OP 636: Performed by: INTERNAL MEDICINE

## 2018-02-27 PROCEDURE — 96374 THER/PROPH/DIAG INJ IV PUSH: CPT | Mod: 59

## 2018-02-27 RX ORDER — OLANZAPINE 10 MG/2ML
5 INJECTION, POWDER, FOR SOLUTION INTRAMUSCULAR ONCE
Status: DISCONTINUED | OUTPATIENT
Start: 2018-02-27 | End: 2018-03-07

## 2018-02-27 RX ORDER — DEXTROSE MONOHYDRATE, SODIUM CHLORIDE, AND POTASSIUM CHLORIDE 50; 1.49; 9 G/1000ML; G/1000ML; G/1000ML
INJECTION, SOLUTION INTRAVENOUS CONTINUOUS
Status: DISCONTINUED | OUTPATIENT
Start: 2018-02-27 | End: 2018-03-05

## 2018-02-27 RX ORDER — MOMETASONE FUROATE 1 MG/G
CREAM TOPICAL
Status: ON HOLD | COMMUNITY
Start: 2018-01-11 | End: 2018-03-27

## 2018-02-27 RX ORDER — PROCHLORPERAZINE MALEATE 5 MG
5 TABLET ORAL EVERY 6 HOURS PRN
Status: DISCONTINUED | OUTPATIENT
Start: 2018-02-27 | End: 2018-03-11

## 2018-02-27 RX ORDER — ACETAMINOPHEN 325 MG/1
650 TABLET ORAL EVERY 4 HOURS PRN
Status: DISCONTINUED | OUTPATIENT
Start: 2018-02-27 | End: 2018-03-16

## 2018-02-27 RX ORDER — PROCHLORPERAZINE 25 MG
12.5 SUPPOSITORY, RECTAL RECTAL EVERY 12 HOURS PRN
Status: DISCONTINUED | OUTPATIENT
Start: 2018-02-27 | End: 2018-03-11

## 2018-02-27 RX ORDER — NALOXONE HYDROCHLORIDE 0.4 MG/ML
.1-.4 INJECTION, SOLUTION INTRAMUSCULAR; INTRAVENOUS; SUBCUTANEOUS
Status: DISCONTINUED | OUTPATIENT
Start: 2018-02-27 | End: 2018-03-07

## 2018-02-27 RX ORDER — OXYCODONE AND ACETAMINOPHEN 5; 325 MG/1; MG/1
1-2 TABLET ORAL EVERY 4 HOURS PRN
Status: DISCONTINUED | OUTPATIENT
Start: 2018-02-27 | End: 2018-03-01

## 2018-02-27 RX ORDER — ONDANSETRON 2 MG/ML
4 INJECTION INTRAMUSCULAR; INTRAVENOUS EVERY 6 HOURS PRN
Status: DISCONTINUED | OUTPATIENT
Start: 2018-02-27 | End: 2018-03-27 | Stop reason: HOSPADM

## 2018-02-27 RX ORDER — HALOPERIDOL 5 MG/ML
2 INJECTION INTRAMUSCULAR EVERY 6 HOURS PRN
Status: DISCONTINUED | OUTPATIENT
Start: 2018-02-27 | End: 2018-03-11

## 2018-02-27 RX ORDER — FENTANYL CITRATE 50 UG/ML
50 INJECTION, SOLUTION INTRAMUSCULAR; INTRAVENOUS
Status: DISCONTINUED | OUTPATIENT
Start: 2018-02-27 | End: 2018-02-27

## 2018-02-27 RX ORDER — ONDANSETRON 4 MG/1
4 TABLET, ORALLY DISINTEGRATING ORAL EVERY 6 HOURS PRN
Status: DISCONTINUED | OUTPATIENT
Start: 2018-02-27 | End: 2018-03-11

## 2018-02-27 RX ORDER — ONDANSETRON 2 MG/ML
4 INJECTION INTRAMUSCULAR; INTRAVENOUS ONCE
Status: COMPLETED | OUTPATIENT
Start: 2018-02-27 | End: 2018-02-27

## 2018-02-27 RX ORDER — HYDROMORPHONE HYDROCHLORIDE 1 MG/ML
0.2 INJECTION, SOLUTION INTRAMUSCULAR; INTRAVENOUS; SUBCUTANEOUS
Status: DISCONTINUED | OUTPATIENT
Start: 2018-02-27 | End: 2018-03-11

## 2018-02-27 RX ORDER — IOPAMIDOL 755 MG/ML
83 INJECTION, SOLUTION INTRAVASCULAR ONCE
Status: COMPLETED | OUTPATIENT
Start: 2018-02-27 | End: 2018-02-27

## 2018-02-27 RX ORDER — OLANZAPINE 5 MG/1
5 TABLET, ORALLY DISINTEGRATING ORAL ONCE
Status: DISCONTINUED | OUTPATIENT
Start: 2018-02-27 | End: 2018-02-27

## 2018-02-27 RX ADMIN — ONDANSETRON 4 MG: 2 INJECTION INTRAMUSCULAR; INTRAVENOUS at 12:43

## 2018-02-27 RX ADMIN — ONDANSETRON 4 MG: 2 INJECTION INTRAMUSCULAR; INTRAVENOUS at 13:14

## 2018-02-27 RX ADMIN — FENTANYL CITRATE 50 MCG: 50 INJECTION, SOLUTION INTRAMUSCULAR; INTRAVENOUS at 13:10

## 2018-02-27 RX ADMIN — IOPAMIDOL 83 ML: 755 INJECTION, SOLUTION INTRAVENOUS at 13:25

## 2018-02-27 RX ADMIN — HALOPERIDOL LACTATE 2 MG: 5 INJECTION, SOLUTION INTRAMUSCULAR at 20:51

## 2018-02-27 RX ADMIN — POTASSIUM CHLORIDE, DEXTROSE MONOHYDRATE AND SODIUM CHLORIDE: 150; 5; 900 INJECTION, SOLUTION INTRAVENOUS at 17:01

## 2018-02-27 RX ADMIN — FAMOTIDINE 20 MG: 10 INJECTION, SOLUTION INTRAVENOUS at 12:39

## 2018-02-27 RX ADMIN — FENTANYL CITRATE 50 MCG: 50 INJECTION, SOLUTION INTRAMUSCULAR; INTRAVENOUS at 12:57

## 2018-02-27 RX ADMIN — SODIUM CHLORIDE 1000 ML: 9 INJECTION, SOLUTION INTRAVENOUS at 12:35

## 2018-02-27 RX ADMIN — SODIUM CHLORIDE 65 ML: 9 INJECTION, SOLUTION INTRAVENOUS at 13:27

## 2018-02-27 ASSESSMENT — ENCOUNTER SYMPTOMS
CHILLS: 0
VOMITING: 1
FEVER: 0
NAUSEA: 1
ABDOMINAL PAIN: 1
CONSTIPATION: 1
SHORTNESS OF BREATH: 0
BACK PAIN: 1

## 2018-02-27 NOTE — CONSULTS
General Surgery [facility] Consultation/H&P    Joseluis Falcon MRN#: [id number]   Age: 85 year old YOB: 1932     Date of Admission:          2/27/2018  Reason for consult/H&P: Possible bowel obstruction   Surgeon:      Adebayo Gonzalez MD                  Chief Complaint:   Abdominal pain         History of Present Illness:   This patient is a 85 year old  male who presented to the [facility] ER with two days of abdominal pain and some vomiting.  He says he has no pain now and is hungry for supper.  He felt he was constipated and last moved his bowels three days ago.  He denies trauma to the abdomen.  He has not had similar episodes before.  He has no history of abdominal operations other than two inguinal hernia repairs.   He does not have any history of recent diarrhea fevers or chills.  He seems slightly confused when asked complex questions..   . History is obtained from the patient and chart.         Past Medical History:    has a past medical history of Arthritis; Carlson's esophagus; GERD (gastroesophageal reflux disease); Hard of hearing; Psoriasis; and Skin cancer.          Past Surgical History:     Past Surgical History:   Procedure Laterality Date     HERNIORRHAPHY INGUINAL Right 12/1/2015    Procedure: HERNIORRHAPHY INGUINAL;  Surgeon: Malcom Rasmussen MD;  Location: [facility]     HERNIORRHAPHY INGUINAL Left 6/9/2017    Procedure: HERNIORRHAPHY INGUINAL;  OPEN LEFT INGUINAL HERNIA REPAIR WITH MESH ;  Surgeon: Aden Haro MD;  Location:  OR            Medications:     Prior to Admission medications    Medication Sig Start Date End Date Taking? Authorizing Provider   IBUPROFEN PO Take 600 mg by mouth every 8 hours as needed    Yes Reported, Patient   mometasone (ELOCON) 0.1 % cream Apply once a day to affected area, ok to use on face 1/11/18  Yes Reported, Patient            Allergies:     Allergies   Allergen Reactions     Seasonal Allergies      Protonix [Pantoprazole]  "Rash            Social History:     Social History   Substance Use Topics     Smoking status: Former Smoker     Quit date: 2/3/1964     Smokeless tobacco: Never Used     Alcohol use Yes      Comment: 1 drink daily             Family History:    This patient has no significant relevant family history.  Family history is reviewed in detail.          Review of Systems:   Brief ROS is negative other than noted in the HPI.  C: NEGATIVE for fever, chills, change in weight  R: NEGATIVE for significant cough or SOB  CV: NEGATIVE for chest pain, palpitations or peripheral edema  GI:  NEGATIVE for dysuria, heartburn, or change in bowel habits  H: NEGATIVE for bleeding problems         Physical Exam:   Blood pressure 130/81, pulse 76, temperature 99.6  F (37.6  C), temperature source Oral, resp. rate 16, height 1.753 m (5' 9\"), weight 74.8 kg (165 lb), SpO2 98 %.       General - This is a well developed, well nourished male .  HEENT - Normocephalic. Atraumatic. Moist mucous membranes. Pupils equal.  No scleral icterus. Nose normal.  Neck - Supple without masses. No cervical adenopathy or thyromegaly. No stridor  Lungs - Breathing not labored  Chest - Not tender. CVA's nontender  Heart - Regular rate & rhythm   Abdomen - Soft, nontender, nondistended with +bowel sounds, no organomegaly. No inguinal hernias present when supine.  No localized guarding or rebound.  Extremities - Moves all extremities. Warm without edema. Palpable pulses  Neurologic - Nonfocal.          Data:   Labs:  Recent Labs   Lab Test  02/27/18   1219   WBC  12.6*   HGB  15.4   HCT  45.1   PLT  154     Recent Labs   Lab Test  02/27/18   1219 01/09/17 11/30/15   POTASSIUM  3.6  4.4  3.8   CHLORIDE  100   --    --    CO2  31   --    --    BUN  25   --    --    CR  1.21  1.15  1.07     Recent Labs   Lab Test  02/27/18   1219 01/09/17 11/30/15   BILITOTAL  1.3   --    --    ALT  16  9  10   AST  13  18  15   ALKPHOS  97   --    --    LIPASE  92   --    --  "     Recent Labs   Lab Test  02/27/18   1219 11/30/15   INR  1.02  1.0     Recent Labs   Lab Test  02/27/18   1219   TOBI  8.9     Recent Labs   Lab Test  02/27/18   1219   ANIONGAP  8   ALBUMIN  3.7       CT scan of the abdomen: images reviewed in detail. He has a distended stomach and duodenum.   Several loops of proximal small bowel were distended with gas and fluid.  Some jejunum has thickening of the wall.  Distal to this there is non-distended small bowel.  There is gas and stool in the colon.  I see no free air.  I see no transition point that is sharply marked from distended to nondistended bowel.    Ultrasound of the abdomen: not done        EKG: Complete; See Chart         Assessment:   Abdominal pain, nausea and vomiting, now resolved.  He is hungry and has no nausea.  He has no abdominal pain.  He says he has some back pain from a golf injury many years ago.  Review of the CT scan does not really show a bowel obstruction.  It shows dilated  Proximal bowel and decompressed distal bowel with a probable area of bowel thickening in the middle.  This is more likely due to an ileus perhaps from inflammation, ischemia, or viral cause.         Plan:    no surgical intervention at this time.  Since he is feeling better we might consider getting sips of liquids for at least ice and  water.  I would encourage walking.  If his bowels started moving or he starts passing gas we may advance his diet.  We will follow him closely with you.       I have discussed the history, physical, and plan with the patient.   Adebayo Gonzalez M.D.

## 2018-02-27 NOTE — IP AVS SNAPSHOT
` [place] [address] [facility]: [phone]            Medication Administration Report for Joseluis Falcon as of 03/27/18 1417   Legend:    Given Hold Not Given Due Canceled Entry Other Actions    Time Time (Time) Time  Time-Action       Inactive    Active    Linked        Medications 03/21/18 03/22/18 03/23/18 03/24/18 03/25/18 03/26/18 03/27/18    acetaminophen (TYLENOL) solution 650 mg  Dose: 650 mg  Freq: EVERY 4 HOURS PRN Route: ORAL OR FEED  PRN Reason: mild pain  Start: 03/23/18 0308   Admin Instructions: Maximum acetaminophen dose from all sources= 75 mg/kg/day not to exceed 4 grams/day.       0314 (650 mg)-Given               acetaminophen (TYLENOL) tablet 650 mg  Dose: 650 mg  Freq: EVERY 4 HOURS PRN Route: PO  PRN Reason: mild pain  Start: 03/23/18 1545   Admin Instructions: Maximum acetaminophen dose from all sources = 75 mg/kg/day not to exceed 4 grams/day.              Or  acetaminophen (TYLENOL) Suppository 650 mg  Dose: 650 mg  Freq: EVERY 4 HOURS PRN Route: RE  PRN Reason: mild pain  Start: 03/23/18 1545   Admin Instructions: Maximum acetaminophen dose from all sources = 75 mg/kg/day not to exceed 4 grams/day.               apixaban ANTICOAGULANT (ELIQUIS) tablet 5 mg  Dose: 5 mg  Freq: 2 TIMES DAILY Route: PO  Start: 03/26/18 2100         (2057)-Not Given [C]        0815 (5 mg)-Given       [ ] 2100           artificial saliva (BIOTENE MT) spray 1 spray  Dose: 1 spray  Freq: EVERY 6 HOURS PRN Route: MT  PRN Reason: dry mouth  Start: 03/05/18 1107              famotidine (PEPCID) tablet 10 mg  Dose: 10 mg  Freq: 2 TIMES DAILY Route: PO  Start: 03/25/18 2100 2116 (10 mg)-Given        0824 (10 mg)-Given       (2057)-Not Given [C]        0815 (10 mg)-Given       [ ] 2100           hydrALAZINE (APRESOLINE) injection 10 mg  Dose: 10 mg  Freq: EVERY 6 HOURS PRN Route: IV  PRN Comment: Treat SBP > 170  Start: 03/16/18 1502   Admin Instructions: Treat SBP > 170; hold for BP <  "100  For ordered doses up to 40 mg, give IV Push undiluted over 1 minute.               lidocaine (LMX4) cream  Freq: EVERY 1 HOUR PRN Route: Top  PRN Reason: pain  PRN Comment: with VAD insertion or accessing implanted port.  Start: 03/20/18 1908   Admin Instructions: Do NOT give if patient has a history of allergy to any local anesthetic or any \"asael\" product.   Apply 30 minutes prior to VAD insertion or port access.  MAX Dose:  2.5 g (  of 5 g tube)               lidocaine 1 % 1 mL  Dose: 1 mL  Freq: EVERY 1 HOUR PRN Route: OTHER  PRN Comment: mild pain with VAD insertion or accessing implanted port  Start: 03/20/18 1908   Admin Instructions: Do NOT give if patient has a history of allergy to any local anesthetic or any \"asael\" product. MAX dose 1 mL subcutaneous OR intradermal in divided doses.               magnesium sulfate 2 g in NS intermittent infusion ([employer]ar[employer] or [facility] Cmpd)  Dose: 2 g  Freq: DAILY PRN Route: IV  PRN Reason: magnesium supplementation  Last Dose: 2 g (03/22/18 1040)  Start: 03/20/18 0342   Admin Instructions: For Serum Mg++ 1.6 - 2 mg/dL  Give 2 g and recheck magnesium level next AM.      1040 (2 g)-New Bag        0532 (2 g)-New Bag               magnesium sulfate 4 g in 100 mL sterile water (premade)  Dose: 4 g  Freq: EVERY 4 HOURS PRN Route: IV  PRN Reason: magnesium supplementation  Start: 03/20/18 0342   Admin Instructions: For serum Mg++ less than 1.6 mg/dL  Give 4 g and recheck magnesium level 2 hours after dose, and next AM.               magnesium sulfate 4 g in 100 mL sterile water (premade)  Dose: 4 g  Freq: EVERY 4 HOURS PRN Route: IV  PRN Reason: magnesium supplementation  Start: 03/05/18 1606   Admin Instructions: For serum Mg++ less than 1.6 mg/dL  Give 4 g and recheck magnesium level 2 hours after dose, and next AM.               naloxone (NARCAN) injection 0.1-0.4 mg  Dose: 0.1-0.4 mg  Freq: EVERY 2 MIN PRN Route: IV  PRN Reason: opioid reversal  Start: 03/07/18 0537 "   Admin Instructions: For respiratory rate LESS than or EQUAL to 8.  Partial reversal dose:  0.1 mg titrated q 2 minutes for Analgesia Side Effects Monitoring Sedation Level of 3 (frequently drowsy, arousable, drifts to sleep during conversation).Full reversal dose:  0.4 mg bolus for Analgesia Side Effects Monitoring Sedation Level of 4 (somnolent, minimal or no response to stimulation).  For ordered doses up to 2mg give IVP. Give each 0.4mg over 15 seconds in emergency situations. For non-emergent situations further dilute in 9mL of NS to facilitate titration of response.               nitroGLYcerin (NITROSTAT) sublingual tablet 0.4 mg  Dose: 0.4 mg  Freq: EVERY 5 MIN PRN Route: SL  PRN Reason: chest pain  Start: 03/20/18 1908   Admin Instructions: Maximum 3 doses in 15 minutes.  Notify provider if no relief after 3 doses.    Do NOT give nitroglycerin SL IF the patient has taken avanafil (STENDRA), sildenafil (VIAGRA) or (REVATIO) within the last 8 hours, vardenafil (LEVITRA) or (STAXYN) within the last 18 hours, tadalafil (CIALIS) or (ADCIRCA) within the last 36 hours. Inform provider if patient has taken one of these medications.  If patient is still having acute angina requiring treatment, an alternative treatment option may be used such as: IV beta-blocker [2.5 mg - 5 mg metoprolol (LOPRESSOR)] if ordered by a provider.               ondansetron (ZOFRAN) injection 4 mg  Dose: 4 mg  Freq: EVERY 6 HOURS PRN Route: IV  PRN Reasons: nausea,vomiting  Start: 02/27/18 8693   Admin Instructions: This is Step 1 of nausea and vomiting management.  If nausea not resolved in 15 minutes, go to Step 2 prochlorperazine (COMPAZINE).  Irritant. For ordered doses up to 4 mg, give IV Push undiluted over 2-5 minutes.               prochlorperazine (COMPAZINE) injection 5 mg  Dose: 5 mg  Freq: EVERY 6 HOURS PRN Route: IV  PRN Reasons: nausea,vomiting  Start: 02/27/18 7977   Admin Instructions: This is Step 2 of nausea and vomiting  management. Give if nausea not resolved 15 minutes after giving ondansetron (ZOFRAN). If nausea not resolved in 15 minutes, go to Step 3 metoclopramide (REGLAN), if ordered.  For ordered doses up to 10 mg, give IV Push undiluted. Each 5mg over 1 minute.               QUEtiapine (SEROquel) tablet 25 mg  Dose: 25 mg  Freq: 2 TIMES DAILY PRN Route: PO  PRN Comment: Agitation  Start: 03/27/18 1034              sodium chloride (PF) 0.9% PF flush 10-20 mL  Dose: 10-20 mL  Freq: EVERY 1 HOUR PRN Route: IK  PRN Reasons: line flush,post meds or blood draw  Start: 03/06/18 0907   Admin Instructions: to flush CVC - Open Ended (Tunneled and Non-Tunneled).  10 mL post IV meds; 20 mL post blood draw.     0559 (20 mL)-Given          0549 (10 mL)-Given              sodium chloride (PF) 0.9% PF flush 3 mL  Dose: 3 mL  Freq: EVERY 1 HOUR PRN Route: IK  PRN Reason: line flush  PRN Comment: for peripheral IV flush post IV meds  Start: 03/20/18 1908             Discontinued Medications  Medications 03/21/18 03/22/18 03/23/18 03/24/18 03/25/18 03/26/18 03/27/18         Dose: 2.5 mg  Freq: 2 TIMES DAILY Route: PO  Start: 03/25/18 2100   End: 03/26/18 1542        2116 (2.5 mg)-Given        0824 (2.5 mg)-Given       1542-Med Discontinued          Dose: 0.2 mg  Freq: EVERY 1 HOUR PRN Route: IV  PRN Reason: moderate to severe pain  Start: 03/04/18 1345   End: 03/26/18 0707   Admin Instructions: For ordered doses up to 4 mg give IV Push undiluted. Administer each 2mg over 2-5 minutes.          0707-Med Discontinued          Dose: 0.5-1 mg  Freq: EVERY 8 HOURS PRN Route: IV  PRN Reason: anxiety  Start: 03/26/18 0715   End: 03/27/18 1022   Admin Instructions: For IV PUSH: Dilute with equal volume of NS. For ordered doses up to 4 mg give IV Push. Administer each 2mg over 1-5 minutes.          1901 (0.5 mg)-Given        1022-Med Discontinued         Dose: 0.5-1 mg  Freq: EVERY 4 HOURS PRN Route: IV  PRN Reason: anxiety  Start: 03/23/18 1543   End:  03/26/18 0708   Admin Instructions: For IV PUSH: Dilute with equal volume of NS. For ordered doses up to 4 mg give IV Push. Administer each 2mg over 1-5 minutes.       1652 (1 mg)-Given        0546 (1 mg)-Given       1753 (1 mg)-Given         0708-Med Discontinued          Dose: 0.4 mg  Freq: DAILY Route: PO  Start: 03/25/18 1330   End: 03/26/18 1542   Admin Instructions: Administer 30 minutes after the same meal each day.         1351 (0.4 mg)-Given        (1104)-Not Given [C]       1542-Med Discontinued     Medications 03/21/18 03/22/18 03/23/18 03/24/18 03/25/18 03/26/18 03/27/18

## 2018-02-27 NOTE — ED PROVIDER NOTES
History     Chief Complaint:  Abdominal Pain     HPI   Joseluis Falcon is a 85 year old male, with a history of GERD, Carlson's esophagus, skin cancer, and s/p bilateral inguinal herniorrhaphy, who presents with his daughter to the ED for evaluation of abdominal pain. The patient reports his abdominal pain which is a tightening sensation began yesterday morning. It begins around his umbilicus and radiates upwards to his epigastric region and to his back. The patient notes he feels nauseous and is vomiting. He has been having dark brown emesis. The patient's daughter reports he has been constipated for the past couple of days. The patient denies any fevers, chills, shortness of breath, chest pain, or urinary symptoms.      Allergies:  Protonix      Medications:    Ibuprofen    Past Medical History:    Arthritis  Carlson's esophagus  GERD  Hard of hearing  Psoriasis  Skin cancer     Past Surgical History:    Bilateral inguinal herniorrhaphy    Family History:    History reviewed. No pertinent family history.     Social History:  Smoking status: Former smoker, Quit date 2/3/1964  Alcohol use: Yes, x1/day   Presents to ED with daughter    Marital Status:   [2]     Review of Systems   Constitutional: Negative for chills and fever.   Respiratory: Negative for shortness of breath.    Gastrointestinal: Positive for abdominal pain, constipation, nausea and vomiting.   Musculoskeletal: Positive for back pain.   All other systems reviewed and are negative.    Physical Exam     Patient Vitals for the past 24 hrs:   BP Temp Temp src Pulse Heart Rate Resp SpO2 Height Weight   02/27/18 1611 130/81 99.6  F (37.6  C) Oral 76 76 16 98 % - -   02/27/18 1530 114/61 - - - - - 95 % - -   02/27/18 1500 130/71 - - - - - 96 % - -   02/27/18 1430 140/79 - - - - - 96 % - -   02/27/18 1400 143/88 - - - - - 96 % - -   02/27/18 1300 152/85 - - - 65 15 99 % - -   02/27/18 1230 111/82 - - - - - 98 % - -   02/27/18 1220 (!) 132/94 - - - - - 98  "% - -   02/27/18 1212 98/79 98.6  F (37  C) Oral - 67 18 95 % 1.753 m (5' 9\") 74.8 kg (165 lb)     Physical Exam  General: Alert, appears well-developed and well-nourished. Cooperative.     In mild distress  HEENT:  Head:  Atraumatic  Ears:  External ears are normal  Mouth/Throat:  Oropharynx is without erythema or exudate and mucous membranes are dry.   Eyes:   Conjunctivae normal and EOM are normal. No scleral icterus.  Neck:   Normal range of motion. Neck supple.  CV:  Normal rate, regular rhythm, normal heart sounds and radial pulses are 2+ and symmetric.  No murmur.  Resp:  Breath sounds are clear bilaterally    Non-labored, no retractions or accessory muscle use  GI:  Left upper quadrant and mid epigastric tenderness. Guarding. No rebound. Abdomen is soft, no distension. No CVA tenderness bilaterally  Rectal:  No hemorrhoid. No parker bleeding on digital exam. No mass detected.   MS:  Normal range of motion. No edema.    Normal strength in all 4 extremities.     Back atraumatic.    No midline cervical, thoracic, or lumbar tenderness  Skin:  Warm and dry.  No rash or lesions noted.  Neuro: Alert. Normal strength. GCS: 15   Psych:  Normal mood and affect.    Emergency Department Course     ECG (12:30:23):  Rate 70 bpm. NJ interval 166. QRS duration 138. QT/QTc 426/460. P-R-T axes 54 74 70. Normal sinus rhythm. Right bundle branch block. Abnormal ECG. Nonspecific ST segment changes in lateral leads. No reciprocal depressions concerning for acute STEMI. ST elevation in inferior leads seen in prior EKG (6/6/17). Significant changed compared to EKG dated 6/6/17. Interpreted at 1237 by Jossue Jesus MD.    Imaging:  Radiographic findings were communicated with the patient and family who voiced understanding of the findings.    CT Abdomen Pelvis w Contrast  IMPRESSION:  1. Proximal small bowel obstruction. This may well be related to  abnormal wall thickening of the mid small bowel loops, as above.  2. Additional " findings discussed above.   As read by Radiology.     Laboratory:  Lipase: 92  Troponin I (1219): <0.015  INR: 1.02  ABO/Rh type & screen: A positive, Antibody negative  Occult blood stool: Negative   CBC: WBC 12.6(H) o/w WNL (HGB 15.4, )  CMP: Glucose 168(H), GFR estimate 57(L), o/w WNL (Creatinine 1.21)    Interventions:  1235: NS 1L Bolus IV  1239: Pepcid 20mg IV  1243: Zofran 4mg IV  1314: Zofran 4mg IV    Emergency Department Course:  Past medical records, nursing notes, and vitals reviewed.  1224: I performed an exam of the patient and obtained history, as documented above.    IV inserted and blood drawn.    The patient was sent for an abdomen pelvis CT while in the emergency department, findings above.    1352: I rechecked the patient. Explained findings to patient and daughter.    1404: I spoke to Dr. Melchor of the hospitalist service who accepts the patient for admission.     Findings and plan explained to the Patient and daughter who consents to admission. Discussed the patient with Dr. Melchor, who will admit the patient to a med bed for further monitoring, evaluation, and treatment.     Impression & Plan      Medical Decision Making:  Patient's a 85 year old male with a past medical history of GERD, Carlson's esophagus, and arthritis, who presents with abdominal pain with radiation towards his back. He has also had nausea and vomiting since yesterday. His pain appears to be diffuse but left sided concerning for potential diverticulitis versus bowel obstruction. It appears on a CT scan that he does have a proximal small bowel obstruction. Additionally, there's abdominal wall thickening of mid small bowel loops. Unclear as to the significance of this. Patient does have sigmoid diverticula but no evidence of diverticulitis. Patient was given pain control and fluids here in the emergency department. He did have a history concerning where he had had vomiting of darker contents where this was coffee  ground like emesis. Slightly unclear. Patient does have an allergy with hives to protonix, so he was given a dose of Zantac here and Famotidine in the emergency department. Patient will be admitted for bowel obstruction, for continued pain control, and rehydration.     Patient had an EKG which did show significant ST changes compared with prior EKG's. Although, there's no reciprocal depressions, potential type II demand ischemia or potentially J point elevation.  An old EKG did show some ST segment elevation in III.  Reassuringly, his initial troponin is negative.    Patient's occult blood was negative on our rectal exam. Hemoglobin stable at 15.4. No evidence of electrolyte dysfunction. Patient will be admitted. Discussed case with Dr. Melchor who agreed to admission.     Diagnosis:    ICD-10-CM   1. Small bowel obstruction K56.609   2. Abdominal pain, generalized R10.84     Disposition: Patient admitted to a med bed by Dr. Jill Godfrey  2/27/2018    EMERGENCY DEPARTMENT    I, Telma Godfrey, am serving as a scribe at 12:24 PM on 2/27/2018 to document services personally performed by Jossue Jesus MD based on my observations and the provider's statements to me.        Jossue Jesus MD  02/27/18 [id number]       Jossue Jesus MD  02/27/18 7128

## 2018-02-27 NOTE — IP AVS SNAPSHOT
MRN:[id number]                      After Visit Summary   2/27/2018    Joseluis Falcon    MRN: [id number]           Thank you!     Thank you for choosing [facility] for your care. Our goal is always to provide you with excellent care. Hearing back from our patients is one way we can continue to improve our services. Please take a few minutes to complete the written survey that you may receive in the mail after you visit with us. Thank you!        Patient Information     Date Of Birth          7/11/1932        Designated Caregiver       Most Recent Value    Caregiver    Will someone help with your care after discharge? yes    Name of designated caregiver Denisse    Phone number of caregiver [phone]    Caregiver address [place]- address in contact info      About your hospital stay     You were admitted on:  February 27, 2018 You last received care in the:  [facility] [address] [facility]    You were discharged on:  March 27, 2018       Who to Call     For medical emergencies, please call 911.  For non-urgent questions about your medical care, please call your primary care provider or clinic, [phone]          Attending Provider     Provider Specialty    Jossue Jesus MD Emergency Medicine    Mazin Melchor MD Internal Medicine    Jenelle Marc MD Internal Medicine       Primary Care Provider Office Phone # Fax #    Danny Diop [phone]      After Care Instructions     Activity - Up with nursing assistance           Advance Diet as Tolerated       Follow this diet upon discharge: Dysphagia Diet Level 1: Pureed; Honey Thickened Liquids (pre-thickened or use instant food thickener) (no straws)            General info for SNF       Length of Stay Estimate: Short Term Care: Estimated # of Days <30  Condition at Discharge: Improving  Level of care:skilled   Rehabilitation Potential: Fair  Admission H&P remains valid and up-to-date: Yes  Recent  "Chemotherapy: N/A  Use Nursing Home Standing Orders: Yes            Mantoux instructions       Give two-step Mantoux (PPD) Per Facility Policy Yes                  Follow-up Appointments     Follow Up and recommended labs and tests       Follow up with Nursing home physician.  No follow up labs or test are needed. Follow up with general surgery as recommended.                  Additional Services     Occupational Therapy Adult Consult       Evaluate and treat as clinically indicated.    Reason:  Physical deconditioning, cognitive impairment            Physical Therapy Adult Consult       Evaluate and treat as clinically indicated.    Reason:  Physical deconditioning            Speech Language Path Adult Consult       Evaluate and treat as clinically indicated.    Reason:  Dysphagia                  Pending Results     Date and Time Order Name Status Description    3/26/2018 1553 EKG 12-lead, tracing only Preliminary     3/23/2018 0356 Blood culture Preliminary     3/23/2018 0356 Blood culture Preliminary             Statement of Approval     Ordered          03/27/18 1151  I have reviewed and agree with all the recommendations and orders detailed in this document.  EFFECTIVE NOW     Approved and electronically signed by:  Raad Aguilar MD             Admission Information     Date & Time Provider Department Dept. Phone    2/27/2018 Jenelle Marc MD [place] [facility] [address] [facility] [phone]      Your Vitals Were     Blood Pressure Pulse Temperature Respirations Height Weight    96/49 57 97.4  F (36.3  C) (Oral) 16 1.753 m (5' 9\") 63.7 kg (140 lb 6.9 oz)    Pulse Oximetry BMI (Body Mass Index)                94% 20.74 kg/m2          MyChart Information     [employer] lets you send messages to your doctor, view your test results, renew your prescriptions, schedule appointments and more. To sign up, go to [email or web address] . Click on \"Log in\" on the left side of the screen, which will take you to " "the Welcome page. Then click on \"Sign up Now\" on the right side of the page.     You will be asked to enter the access code listed below, as well as some personal information. Please follow the directions to create your username and password.     Your access code is: [email or web address]  Expires: 2018  1:21 PM     Your access code will  in 90 days. If you need help or a new code, please call your [facility] clinic or [phone].        Care EveryWhere ID     This is your Care EveryWhere ID. This could be used by other organizations to access your [facility] medical records  [id number]        Equal Access to Services     [facility] : Haily Rodríguez, raleigh mittal, alex patricio, chris mcconnell . So [facility] [phone].    ATENCIÓN: Si habla español, tiene a cao disposición servicios gratuitos de asistencia lingüística. Llame al [phone].    We comply with applicable federal civil rights laws and [place] laws. We do not discriminate on the basis of race, color, national origin, age, disability, sex, sexual orientation, or gender identity.               Review of your medicines      START taking        Dose / Directions    apixaban ANTICOAGULANT 5 MG tablet   Commonly known as:  ELIQUIS   Used for:  Atrial fibrillation, unspecified type (H)        Dose:  5 mg   Take 1 tablet (5 mg) by mouth 2 times daily   Refills:  0       QUEtiapine 25 MG tablet   Commonly known as:  SEROquel   Used for:  Agitation        Dose:  25 mg   Take 1 tablet (25 mg) by mouth 2 times daily as needed (Agitation)   Quantity:  60 tablet   Refills:  0         STOP taking     IBUPROFEN PO           mometasone 0.1 % cream   Commonly known as:  ELOCON                Where to get your medicines      Some of these will need a paper prescription and others can be bought over the counter. Ask your nurse if you have questions.     You don't need a prescription for these medications     " apixaban ANTICOAGULANT 5 MG tablet    QUEtiapine 25 MG tablet                Protect others around you: Learn how to safely use, store and throw away your medicines at www.disposemymeds.org.             Medication List: This is a list of all your medications and when to take them. Check marks below indicate your daily home schedule. Keep this list as a reference.      Medications           Morning Afternoon Evening Bedtime As Needed    apixaban ANTICOAGULANT 5 MG tablet   Commonly known as:  ELIQUIS   Take 1 tablet (5 mg) by mouth 2 times daily   Last time this was given:  5 mg on 3/27/2018  8:15 AM   Next Dose Due:  This evening, 3/27/18                                   QUEtiapine 25 MG tablet   Commonly known as:  SEROquel   Take 1 tablet (25 mg) by mouth 2 times daily as needed (Agitation)   Last time this was given:  25 mg on 3/22/2018 10:16 PM   Next Dose Due:  Available at anytime                                             More Information        Small Bowel Obstruction     Small bowel obstruction can lead to tissue damage and even tissue death.   A small bowel obstruction occurs when part or all of the small intestine (bowel) is blocked. As a result, digestive contents can t move through the bowel properly and out of the body. Treatment is needed right away to remove the blockage. This can ease painful symptoms. It can also prevent serious problems, such as tissue death or bursting (rupture) of the small bowel. Without treatment, a small bowel obstruction can be fatal.  Causes of small bowel obstruction  A small bowel obstruction can be caused by:    Scar tissue (adhesions). These may form after belly (abdominal) surgery or an infection.    Hernia. A hernia is when an organ pushes through a weak spot or tear in the abdomen wall. Part of the small bowel can push out and be seen as a bulge under the belly. Hernias can also occur internally.    Certain health problems. These include when part of the bowel  slides inside another part (intussusception). Other causes include irritable bowel disease such as Crohn s disease, and inflammation and sores in the intestine (ulcerative colitis).    Abnormal tissue growths (tumors). These can form on the inside or outside of the small bowel. They are usually due to cancer.  Symptoms of small bowel obstruction  Common symptoms include:    Belly cramping and pain    Belly swelling and bloating    Upset stomach (nausea) and vomiting    Can't  pass gas    Can't pass stool (constipation)    Diarrhea  Diagnosing small bowel obstruction  Your provider will ask about your symptoms and health history. You ll also have a physical exam. Tests may also be done to confirm the problem. These can include:    Imaging tests. These provide pictures of the small bowel. Common tests include X-rays and a CT scan.    Blood tests. These check for infection and other problems, such as excess fluid loss (dehydration).    Upper GI (gastrointestinal) series with a small bowel follow-through. This test takes X-rays of the upper digestive tract from the mouth through the small bowel. An X-ray dye (contrast fluid) is used. The dye coats the inside of your upper digestive tract so it will show up clearly on X-rays.  Treating small bowel obstruction  Treatment takes place in a hospital. As part of your care, the following may be done:    No food or drink is given by mouth. This allows your bowels to rest.    An IV (intravenous) line is placed in a vein in your arm or hand. The IV line is used to give fluids. It may also be used to give medicines. These may be needed to ease pain, nausea, and other symptoms. They may also be needed to treat or prevent infections.    A soft, thin, flexible tube (nasogastric tube) is inserted through your nose and into your stomach. The tube is used to remove extra gas and fluid in your stomach and bowels. This helps to ease symptoms such as pain and swelling.    In severe cases,  surgery is done. This may be needed if the small bowel is almost or totally blocked, or there is a hole in the bowel (bowel perforation). During surgery, the blockage is removed. Parts of the bowel may also be removed if there is tissue death. Other repair may be done as well, depending on what caused the blockage. Your healthcare provider will give you more information about surgery, if needed.    You ll be watched closely in the hospital until your symptoms improve. Your provider will tell you when you can go home.  Long-term concerns   After treatment, most people recover with no lasting effects. If a long part of the bowel is removed, there is a greater chance for lifelong digestive problems. Bowel movements may become irregular. Work with your provider to learn the best ways to manage any symptoms you may have, and to protect your health.  When to call your healthcare provider  Call your provider right away if you have any of the following:    Severe pain (Call 911)    Belly swelling or cramping that won t go away    Can t pass stool or gas    Nausea or vomiting (especially if the vomit looks or smells like stool)   Date Last Reviewed: 7/1/2016 2000-2017 [employer]. [address] [place] [address], [place] [address]. All rights reserved. This information is not intended as a substitute for professional medical care. Always follow your healthcare professional's instructions.

## 2018-02-27 NOTE — IP AVS SNAPSHOT
"[place] [address] [facility]: [phone]                                              INTERAGENCY TRANSFER FORM - PHYSICIAN ORDERS   2018                    Hospital Admission Date: 2018  ISRAEL PERDOMO   : 1932  Sex: Male        Attending Provider: Jenelle Marc MD     Allergies:  Seasonal Allergies, Protonix [Pantoprazole]    Infection:  None   Service:  HOSPITALIST    Ht:  1.753 m (5' 9\")   Wt:  63.7 kg (140 lb 6.9 oz)   Admission Wt:  74.8 kg (165 lb)    BMI:  20.74 kg/m 2   BSA:  1.76 m 2            Patient PCP Information     Provider PCP Type    Danny Diop MD General      ED Clinical Impression     Diagnosis Description Comment Added By Time Added    Small bowel obstruction [K56.609] Small bowel obstruction [K56.609]  Jossue Jesus MD 2018  1:47 PM    Abdominal pain, generalized [R10.84] Abdominal pain, generalized [R10.84]  Jossue Jesus MD 2018  1:47 PM      Hospital Problems as of 3/27/2018              Priority Class Noted POA    Small bowel obstruction Medium  2018 Yes    Protein-calorie malnutrition (H) Medium  3/7/2018 Yes      Non-Hospital Problems as of 3/27/2018     None      Code Status History     Date Active Date Inactive Code Status Order ID Comments User Context    3/27/2018 11:51 AM  DNR/DNI [id number]  Raad Aguilar MD Outpatient    3/20/2018  2:03 PM 3/27/2018 11:51 AM DNR/DNI [id number]  Roverto Abreu MD Inpatient    2018  3:53 PM 3/20/2018  2:03 PM Full Code [id number]  Mazin Melchor MD Inpatient         Medication Review      START taking        Dose / Directions Comments    apixaban ANTICOAGULANT 5 MG tablet   Commonly known as:  ELIQUIS   Used for:  Atrial fibrillation, unspecified type (H)        Dose:  5 mg   Take 1 tablet (5 mg) by mouth 2 times daily   Refills:  0        QUEtiapine 25 MG tablet   Commonly known as:  SEROquel   Used for:  Agitation        Dose:  25 mg   Take 1 tablet (25 mg) by mouth 2 times " daily as needed (Agitation)   Quantity:  60 tablet   Refills:  0          STOP taking     IBUPROFEN PO           mometasone 0.1 % cream   Commonly known as:  ELOCON                   After Care     Activity - Up with nursing assistance           Advance Diet as Tolerated       Follow this diet upon discharge: Dysphagia Diet Level 1: Pureed; Honey Thickened Liquids (pre-thickened or use instant food thickener) (no straws)       General info for SNF       Length of Stay Estimate: Short Term Care: Estimated # of Days <30  Condition at Discharge: Improving  Level of care:skilled   Rehabilitation Potential: Fair  Admission H&P remains valid and up-to-date: Yes  Recent Chemotherapy: N/A  Use Nursing Home Standing Orders: Yes       Mantoux instructions       Give two-step Mantoux (PPD) Per Facility Policy Yes             Referrals     Occupational Therapy Adult Consult       Evaluate and treat as clinically indicated.    Reason:  Physical deconditioning, cognitive impairment       Physical Therapy Adult Consult       Evaluate and treat as clinically indicated.    Reason:  Physical deconditioning       Speech Language Path Adult Consult       Evaluate and treat as clinically indicated.    Reason:  Dysphagia             Follow-Up Appointment Instructions     Future Labs/Procedures    Follow Up and recommended labs and tests     Comments:    Follow up with Nursing home physician.  No follow up labs or test are needed. Follow up with general surgery as recommended.      Follow-Up Appointment Instructions     Follow Up and recommended labs and tests       Follow up with Nursing home physician.  No follow up labs or test are needed. Follow up with general surgery as recommended.             Statement of Approval     Ordered          03/27/18 1151  I have reviewed and agree with all the recommendations and orders detailed in this document.  EFFECTIVE NOW     Approved and electronically signed by:  Raad Aguilar MD

## 2018-02-27 NOTE — IP AVS SNAPSHOT
"` `     [place] [address] [facility]: [phone]                                              INTERAGENCY TRANSFER FORM - NURSING   2018                    Hospital Admission Date: 2018  ISRAEL PERDOMO   : [id number]  Sex: Male        Attending Provider: Jenelle Marc MD     Allergies:  Seasonal Allergies, Protonix [Pantoprazole]    Infection:  None   Service:  HOSPITALIST    Ht:  1.753 m (5' 9\")   Wt:  63.7 kg (140 lb 6.9 oz)   Admission Wt:  74.8 kg (165 lb)    BMI:  20.74 kg/m 2   BSA:  1.76 m 2            Patient PCP Information     Provider PCP Type    Danny Diop MD General      Current Code Status     Date Active Code Status Order ID Comments User Context       Prior      Code Status History     Date Active Date Inactive Code Status Order ID Comments User Context    3/27/2018 11:51 AM  DNR/DNI [id number]  Raad Aguilar MD Outpatient    3/20/2018  2:03 PM 3/27/2018 11:51 AM DNR/DNI [id number]  Roverto Abreu MD Inpatient    2018  3:53 PM 3/20/2018  2:03 PM Full Code [id number]  Mazin Melchor MD Inpatient      Advance Directives        Scanned docmt in ACP Activity?           No scanned doc        Hospital Problems as of 3/27/2018              Priority Class Noted POA    Small bowel obstruction Medium  2018 Yes    Protein-calorie malnutrition (H) Medium  3/7/2018 Yes      Non-Hospital Problems as of 3/27/2018     None      Immunizations     Name Date      Influenza (High Dose) 3 valent vaccine 17     Influenza (High Dose) 3 valent vaccine 11/17/15          END      ASSESSMENT     Discharge Profile Flowsheet     EXPECTED DISCHARGE     FINAL RESOURCES      Expected Discharge Date  18 1510   Resources List  Transitional Care 18 1125    DISCHARGE NEEDS ASSESSMENT     Referrals Placed  TCU 18 1125    Concerns To Be Addressed  adjustment to diagnosis/illness concerns 18 1510   SKIN      Concerns Comments  Patient to be " discussing TCU options with dtr 03/26/18 1510   Inspection of bony prominences  Full 03/27/18 1208    Patient/family verbalizes understanding of discharge plan recommendations?  Yes 03/26/18 1510   Full except areas not inspected   Coccyx 03/26/18 1055    Equipment Currently Used at Home  walker, rolling (one comfort ht toilet stool (another std),  4ww, FWW) 03/03/18 1627   Inspection under devices  Full 03/27/18 1208    Transportation Available  car;family or friend will provide (pt reports drives at baseline, daughter transported to ED) 03/03/18 1627   Skin WDL  ex 03/27/18 1208    GASTROINTESTINAL (ADULT,PEDIATRIC,OB)     Skin Color/Characteristics  bruised (ecchymotic) 03/27/18 1208    GI WDL  WDL 03/27/18 1208   Skin Temperature  warm 03/27/18 1208    Abdominal Appearance  flat 03/27/18 0806   Skin Moisture  dry 03/27/18 1208    All Quadrants Bowel Sounds  audible and active in all quadrants 03/27/18 1208   Skin Elasticity  quick return to original state 03/26/18 1055    All Quadrants Palpation  soft/nontender 03/18/18 1515   Skin Integrity  bruise(s);incision(s) 03/27/18 1208    LLQ Palpation  soft/nontender 03/24/18 0458   Not Inspected under devices  ETT and stabilizer 03/14/18 1225    RLQ Palpation  soft/nontender 03/24/18 0458   SAFETY      Last Bowel Movement  03/25/18 03/25/18 0651   Safety WDL  WDL 03/27/18 1208    GI Signs/Symptoms  fecal incontinence 03/24/18 1517   All Alarms  alarm(s) activated and audible 03/27/18 1208    Passing flatus  yes 03/27/18 1208   Safety Equipment  oxygen flowmeter;manual resusciator with appropriate mask;suction regulator;suction equipment 03/19/18 2157    COMMUNICATION ASSESSMENT     Safety Factors  ID band on;upper side rails raised x 2;call light in reach;wheels locked;bed in low position 03/27/18 1208    Patient's communication style  spoken language (English or Bilingual) 02/27/18 1209                      Assessment WDL (Within Defined Limits) Definitions          "  Safety WDL     Effective: 09/28/15    Row Information: <b>WDL Definition:</b> Bed in low position, wheels locked; call light in reach; upper side rails up x 2; ID band on<br> <font color=\"gray\"><i>Item=AS safety wdl>>List=AS safety wdl>>Version=F14</i></font>      Skin WDL     Effective: 09/28/15    Row Information: <b>WDL Definition:</b> Warm; dry; intact; elastic; without discoloration; pressure points without redness<br> <font color=\"gray\"><i>Item=AS skin wdl>>List=AS skin wdl>>Version=F14</i></font>      Vitals     Vital Signs Flowsheet     VITAL SIGNS     ANALGESIA SIDE EFFECTS MONITORING      Temp  97.4  F (36.3  C) 03/27/18 1118   Side Effects Monitoring: Respiratory Quality  R 03/27/18 0517    Temp src  Oral 03/27/18 1118   Side Effects Monitoring: Respiratory Depth  N 03/27/18 0517    Resp  16 03/27/18 1118   Side Effects Monitoring: Sedation Level  S 03/27/18 0517    Pulse  57 03/27/18 1118   HEIGHT AND WEIGHT      Heart Rate  54 03/27/18 0202   Height  1.753 m (5' 9\") 02/27/18 1213    Pulse/Heart Rate Source  Monitor 03/27/18 0202   Height Method  Stated 02/27/18 1213    BP  96/49 03/27/18 1118   Weight  63.7 kg (140 lb 6.9 oz) 03/20/18 0456    BP Location  Left arm 03/27/18 0202   Weight Method  Bed scale 03/20/18 0456    Patient Position  Lying 03/04/18 1139   Bed Scale  Standard (fitted sheet, draw sheet/ pad, cover/flat sheet, blanket, two pillows) 03/13/18 0442    OXYGEN THERAPY     BSA (Calculated - sq m)  1.91 02/27/18 1213    SpO2  94 % 03/27/18 1118   BMI (Calculated)  24.42 02/27/18 1213    O2 Device  None (Room air) 03/27/18 1118   POSITIONING      FiO2 (%)  40 % 03/18/18 1548   Body Position  independently positioning 03/27/18 0924    Oxygen Delivery  1 LPM 03/20/18 1529   Head of Bed (HOB)  HOB at 30 degrees 03/27/18 0924    Suction Occurrance  -- (suctioned ETT/ORAL) 03/18/18 1534   Positioning/Transfer Devices  pillows;in use 03/26/18 0623    PAIN/COMFORT     Chair  Shifted weight " 03/23/18 1013    Patient Currently in Pain  denies 03/27/18 1201   DAILY CARE      Preferred Pain Scale  CPOT (Critical-Care Pain Observation Tool) 03/20/18 0426   Activity Management  up in chair 03/27/18 1321    Patient's Stated Pain Goal  Unable to Assess 03/17/18 1443   Activity Assistance Provided  assistance, 1 person 03/27/18 1321    0-10 Pain Scale  -- (LEANN VENTED SEDATED) 03/17/18 0916   Assistive Device Utilized  gait belt;walker 03/27/18 1321    Assessment Indicator  patient not able 03/23/18 0955   ECG      Increased Vital Signs  0 03/23/18 0955   ECG Rhythm  Atrial fibrillation 03/20/18 1615    Expression  0 03/23/18 0955   Ectopy  None 03/20/18 0221    Sleepless  0 03/23/18 0955   Lead Monitored  Lead II;V 1 03/20/18 0426    Pain Location  Other (Comment) (LEANN) 03/17/18 0916   Equipment  electrodes changed;telemetry batteries changed 03/21/18 1037    Pain Orientation  Other (Comment) (LEANN) 03/17/18 0916   RESPIRATORY MONITORING      Pain Descriptors  Patient unable to describe 03/17/18 0916   Respiratory Monitoring (EtCO2)  -- (unable to apply to patient) 03/04/18 1219    Pain Management Interventions  pillow support provided 03/17/18 0511   Integrated Pulmonary Index (IPI)  -- (unable to apply to patient) 03/04/18 1219    Alex Scale Score  -- (PATIENT WEAN DOWN FRM SEDATION) 03/17/18 0916   POINT OF CARE TESTING      Pain Intervention(s)  Repositioned 03/17/18 1828   Puncture Site  fingertip 03/20/18 1608    Response to Interventions  Absence of nonverbal indicators of pain 03/27/18 0517   Bedside Glucose (mg/dl )   105 mg/dl 03/19/18 0540    CRITICAL-CARE PAIN OBSERVATION TOOL (CPOT)     RYAN COMA SCALE      Facial Expression  0 03/20/18 1759   Best Eye Response  4-->(E4) spontaneous 03/20/18 1759    Body Movements  0 03/20/18 1759   Best Motor Response  6-->(M6) obeys commands 03/20/18 1759    Compliance w/ventilator (intubated patients)  Extubated 03/20/18 0426   Best Verbal Response   5-->(V5) oriented (oriented to self and ) 18 1759    Vocalization (extubated patients)  1 18 1759   Meng Coma Scale Score  15 18 1759    Muscle Tension  0 18 1759   Assessment Qualifiers  patient not sedated/intubated 18 0348    Total  0 18 0426                 Patient Lines/Drains/Airways Status    Active LINES/DRAINS/AIRWAYS     Name: Placement date: Placement time: Site: Days: Last dressing change:    Pressure Injury 18 Left;Right Ischial tuberosity reddened 18   0930    23     Wound 18 Left Arm Skin tear 18   1158   Arm   23     Wound 18 Right Arm Skin tear 18   1147   Arm   21     Wound 18 Right Chest Skin tear  18   1313   Chest   21     Wound 18 Left;Upper Arm Skin tear 18   1339   Arm   20     Wound 18 Upper Lip Skin tear Scab/Skin Tear on upper lip  18   0502   Lip   18     Wound 18 Posterior Buttocks Other (comment) Blister and broken area of skin 18   1600   Buttocks   17     Wound 18 Right Cheek Skin tear small skin tear from e- tab removal 18   1600   Cheek   8     Incision/Surgical Site 12/01/15 Right Groin 12/01/15   0840    847     Incision/Surgical Site 17 Left Groin 17   1908    290     Incision/Surgical Site 18 Abdomen 18   1128    23     Incision/Surgical Site 18 Midline Abdomen 18   1129    23             Patient Lines/Drains/Airways Status    Active PICC/CVC     Name: Placement date: Placement time: Site: Days: Additional Info Last dressing change:    PICC Triple Lumen 18 Right Basilic 18   1238   Basilic   21 External Cath Length (cm): 6 cm   18 1157 (2.33 hrs)         Size (Fr): 5 Fr            Orientation: Right            Extremity Circumference (cm): 24 cm            Catheter Brand:  Greats            Dressing Intervention: Chlorhexidine patch;Transparent;Securing device            Description:  Valved;Power PICC            Total Catheter Length (cm) Trimmed: 43 cm            Site Prep: Chlorhexidine            Local Anesthetic: Injectable            Inserted by: BETSY Damon RN            Insertion attempts with ultrasound: 1            Patient Tolerance: Tolerated well            Placement Verification: Xray;Blood Return            Difficulty with threading line: No            Tip location: SVC            Full barrier precautions done: Yes            Consent Signed: Yes            Time Out performed: Yes            Lot #: [id number]               Intake/Output Detail Report     Date Intake             Output     Net    Shift P.O. I.V. NG/GT IV Piggyback TPN Colloid Enteral Total Urine Emesis/NG output Other Total       Noc 03/25/18 2300 - 03/26/18 0659 -- -- -- -- -- -- -- -- -- -- -- -- 0    Day 03/26/18 0700 - 03/26/18 1459 60 -- -- -- -- -- -- 60 -- -- -- -- 60    Shayla 03/26/18 1500 - 03/26/18 2259 -- -- -- -- -- -- -- -- -- -- -- -- 0    Noc 03/26/18 2300 - 03/27/18 0659 60 -- -- -- -- -- -- 60 -- -- -- -- 60    Day 03/27/18 0700 - 03/27/18 1459 240 -- -- -- -- -- -- 240 100 -- -- 100 140      Last Void/BM       Most Recent Value    Urine Occurrence 1 at 03/27/2018 1110    Stool Occurrence 1 [med soft] at 03/25/2018 1142      Case Management/Discharge Planning     Case Management/Discharge Planning Flowsheet     REFERRAL INFORMATION     Expected Discharge Date  03/27/18 03/26/18 1510    Admission Type  inpatient 03/08/18 1547   ASSESSMENT/CONCERNS TO BE ADDRESSED      Arrived From  home or self-care 03/08/18 1547   Concerns To Be Addressed  adjustment to diagnosis/illness concerns 03/26/18 1510    Reason For Consult  discharge planning 03/08/18 1547   Concerns Comments  Patient to be discussing TCU options with dtr 03/26/18 1510    Record Reviewed  plan of care 03/08/18 1547   DISCHARGE PLANNING      CTS Assigned to Case  Hayley Rivero 03/27/18 1114   Patient/family verbalizes understanding of discharge plan  recommendations?  Yes 03/26/18 1510    LIVING ENVIRONMENT     Transportation Available  car;family or friend will provide (pt reports drives at baseline, daughter transported to ED) 03/03/18 1627    Lives With  alone 03/08/18 1547   PATIENT PLACEMENT INFORMATION      Living Arrangements  condominium 03/08/18 1547   Facility 1 Name  [facility] 03/27/18 1125    Provides Primary Care For  no one 03/08/18 1547   Facility 1 Reason for Decline  [facility] 03/27/18 1125    Quality Of Family Relationships  supportive;involved 03/08/18 1547   Facility 2 Name  [facility] 03/27/18 1125    Able to Return to Prior Living Arrangements  no 03/08/18 1547   Facility 2 Reason for Decline  [facility] 03/27/18 1125    HOME SAFETY     [facility] 3 Name  [facility] 03/27/18 1125    Patient Feels Safe Living in Home?  yes 03/08/18 1547   FINAL NOTE      ASSESSMENT OF FAMILY/SOCIAL SUPPORT     Final Note  DC to [facility] 03/27/18 1125    Marital Status   03/08/18 1548   FINAL RESOURCES      Who is your support system?  Children;Wife 03/08/18 1548   Equipment Currently Used at Home  walker, rolling (one comfort ht toilet stool (another std),  4ww, FWW) 03/03/18 1627    Description of Support System  Supportive;Involved 03/08/18 1547   Resources List  Transitional Care 03/27/18 1125    Support Assessment  Adequate family and caregiver support 03/08/18 1547   Referrals Placed  TCU 03/27/18 1125    Quality of Family Relationships  supportive;involved 03/08/18 1547   ABUSE RISK SCREEN      Communication preferences  phone 03/08/18 1547   QUESTION TO PATIENT:  Has a member of your family or a partner(now or in the past) intimidated, hurt, manipulated, or controlled you in any way?  no 02/27/18 1214    COPING/STRESS     QUESTION TO PATIENT: Do you feel safe going back to the place where you are living?  yes 02/27/18 1214    Major Change/Loss/Stressor  hospitalization 03/08/18 1547   OBSERVATION: Is there reason to believe  there has been maltreatment of a vulnerable adult (ie. Physical/Sexual/Emotional abuse, self neglect, lack of adequate food, shelter, medical care, or financial exploitation)?  no 02/27/18 1214    Patient Personal Strengths  strong support system 03/08/18 1547   OTHER      Sources Of Support  adult child(lyssa);spouse 03/08/18 1548   Are you depressed or being treated for depression?  No 02/27/18 1629    Reaction To Health Status  unable to assess 03/08/18 1547   HOMICIDE RISK      Understanding Of Condition And Treatment  adequate understanding of medical condition;unable to assess 03/08/18 1547   Feels Like Hurting Others  no 02/27/18 1214    EXPECTED DISCHARGE

## 2018-02-27 NOTE — IP AVS SNAPSHOT
Joseluis Falcon #[id number] (CSN: [id number])  (85 year old M)  (Adm: 18)     [id number]               [place] [address] [facility] SPECIALITIES: [phone]            Patient Demographics     Patient Name Sex          Age SSN Address Phone    Joseluis Falcon Male 1932 (85 year old) [id number]  [facility] [id number] [phone] (Home)      Emergency Contact(s)     Name Relation Home Work Mobile    Denisse Falcon Spouse [phone]    Prema Vásquez Daughter   [phone]      Admission Information     Attending Provider Admitting Provider Admission Type Admission Date/Time    Jenelle Marc MD Rauniyar, Mazin Perry MD Emergency 18  1202    Discharge Date Hospital Service Auth/Cert Status Service Area     Hospitalist [facility] SERVICES    Unit Room/Bed Admission Status       [facility] 3329/3329-01 Admission (Confirmed)       Admission     Complaint    Small bowel obstruction, unknown      Hospital Account     Name Acct ID Class Status Primary Coverage    Joseluis Falcon [id number] Inpatient Open UCARE - [employer]            Guarantor Account (for Hospital Account #[id number])     Name Relation to Pt Service Area Active? Acct Type    Joseluis Falcon Self FCS Yes Personal/Family    Address Phone          [address]  [place], [place] [address] [phone](H)  NONE(O)              Coverage Information (for Hospital Account #[id number])     F/O Payor/Plan Precert #    UCARE/UCARE FOR SENIORS     Subscriber Subscriber #    Joseluis Falcon [id number]    Address Phone    [address]  [place] [id number] [phone]                                                INTERAGENCY TRANSFER FORM - PHYSICIAN ORDERS   2018                       [place] [address] [facility] SPECIALITIES: [phone]            Attending Provider: Jenelle Marc MD     Allergies:  Seasonal Allergies, Protonix [Pantoprazole]     "Infection:  None   Service:  HOSPITALIST    Ht:  1.753 m (5' 9\")   Wt:  63.7 kg (140 lb 6.9 oz)   Admission Wt:  74.8 kg (165 lb)    BMI:  20.74 kg/m 2   BSA:  1.76 m 2            ED Clinical Impression     Diagnosis Description Comment Added By Time Added    Small bowel obstruction [K56.609] Small bowel obstruction [K56.609]  Jossue Jesus MD 2/27/2018  1:47 PM    Abdominal pain, generalized [R10.84] Abdominal pain, generalized [R10.84]  Jossue Jesus MD 2/27/2018  1:47 PM      Hospital Problems as of 3/27/2018              Priority Class Noted POA    Small bowel obstruction Medium  2/27/2018 Yes    Protein-calorie malnutrition (H) Medium  3/7/2018 Yes      Non-Hospital Problems as of 3/27/2018     None      Code Status History     Date Active Date Inactive Code Status Order ID Comments User Context    2/27/2018  3:53 PM 3/20/2018  2:03 PM Full Code [id number]  Mazin Melchor MD Inpatient      Current Code Status     Date Active Code Status Order ID Comments User Context       3/20/2018  2:03 PM DNR/DNI [id number]  Roverto Abreu MD Inpatient          Medication Review      UNREVIEWED medications. Ask your doctor about these medications        Dose / Directions Comments    IBUPROFEN PO        Dose:  600 mg   Take 600 mg by mouth every 8 hours as needed   Refills:  0        mometasone 0.1 % cream   Commonly known as:  ELOCON        Apply once a day to affected area, ok to use on face   Refills:  0                                                    INTERAGENCY TRANSFER FORM - NURSING   2/27/2018                       [place] [address] SURGICAL SPECIALITIES: [phone]            Attending Provider: Jenelle Marc MD     Allergies:  Seasonal Allergies, Protonix [Pantoprazole]    Infection:  None   Service:  HOSPITALIST    Ht:  1.753 m (5' 9\")   Wt:  63.7 kg (140 lb 6.9 oz)   Admission Wt:  74.8 kg (165 lb)    BMI:  20.74 kg/m 2   BSA:  1.76 m 2            Advance Directives        Scanned docmt in ACP " Activity?           No scanned doc        Immunizations     Name Date      Influenza (High Dose) 3 valent vaccine 01/09/17     Influenza (High Dose) 3 valent vaccine 11/17/15       ASSESSMENT     Discharge Profile Flowsheet     EXPECTED DISCHARGE     FINAL RESOURCES      Expected Discharge Date  03/27/18 03/26/18 1510   Resources List  Transitional Care 03/27/18 1125    DISCHARGE NEEDS ASSESSMENT     Referrals Placed  TCU 03/27/18 1125    Concerns To Be Addressed  adjustment to diagnosis/illness concerns 03/26/18 1510   SKIN      Concerns Comments  Patient to be discussing TCU options with dtr 03/26/18 1510   Inspection of bony prominences  Full 03/27/18 0924    Patient/family verbalizes understanding of discharge plan recommendations?  Yes 03/26/18 1510   Full except areas not inspected   Coccyx 03/26/18 1055    Equipment Currently Used at Home  walker, rolling (one comfort ht toilet stool (another std),  4ww, FWW) 03/03/18 1627   Inspection under devices  Full 03/27/18 0924    Transportation Available  car;family or friend will provide (pt reports drives at baseline, daughter transported to ED) 03/03/18 1627   Skin WDL  ex 03/27/18 0924    GASTROINTESTINAL (ADULT,PEDIATRIC,OB)     Skin Color/Characteristics  bruised (ecchymotic) 03/27/18 0924    GI WDL  WDL 03/27/18 0806   Skin Temperature  warm 03/27/18 0924    Abdominal Appearance  flat 03/27/18 0806   Skin Moisture  dry 03/27/18 0924    All Quadrants Bowel Sounds  audible and active in all quadrants 03/27/18 0806   Skin Elasticity  quick return to original state 03/26/18 1055    All Quadrants Palpation  soft/nontender 03/18/18 1515   Skin Integrity  bruise(s);incision(s) 03/27/18 0806    LLQ Palpation  soft/nontender 03/24/18 0458   Not Inspected under devices  ETT and stabilizer 03/14/18 1225    RLQ Palpation  soft/nontender 03/24/18 0458   SAFETY      Last Bowel Movement  03/25/18 03/25/18 0651   Safety WDL  WDL 03/27/18 0924    GI Signs/Symptoms  fecal  "incontinence 03/24/18 1517   All Alarms  alarm(s) activated and audible 03/27/18 0924    Passing flatus  yes 03/27/18 0806   Safety Equipment  oxygen flowmeter;manual resusciator with appropriate mask;suction regulator;suction equipment 03/19/18 2157    COMMUNICATION ASSESSMENT     Safety Factors  ID band on;upper side rails raised x 2;call light in reach;wheels locked;bed in low position 03/27/18 0924    Patient's communication style  spoken language (English or Bilingual) 02/27/18 1209                      Assessment WDL (Within Defined Limits) Definitions           Safety WDL     Effective: 09/28/15    Row Information: <b>WDL Definition:</b> Bed in low position, wheels locked; call light in reach; upper side rails up x 2; ID band on<br> <font color=\"gray\"><i>Item=AS safety wdl>>List=AS safety wdl>>Version=F14</i></font>      Skin WDL     Effective: 09/28/15    Row Information: <b>WDL Definition:</b> Warm; dry; intact; elastic; without discoloration; pressure points without redness<br> <font color=\"gray\"><i>Item=AS skin wdl>>List=AS skin wdl>>Version=F14</i></font>      Vitals     Vital Signs Flowsheet     VITAL SIGNS     ANALGESIA SIDE EFFECTS MONITORING      Temp  97.4  F (36.3  C) 03/27/18 1118   Side Effects Monitoring: Respiratory Quality  R 03/27/18 0517    Temp src  Oral 03/27/18 1118   Side Effects Monitoring: Respiratory Depth  N 03/27/18 0517    Resp  16 03/27/18 1118   Side Effects Monitoring: Sedation Level  S 03/27/18 0517    Pulse  57 03/27/18 1118   HEIGHT AND WEIGHT      Heart Rate  54 03/27/18 0202   Height  1.753 m (5' 9\") 02/27/18 1213    Pulse/Heart Rate Source  Monitor 03/27/18 0202   Height Method  Stated 02/27/18 1213    BP  96/49 03/27/18 1118   Weight  63.7 kg (140 lb 6.9 oz) 03/20/18 0456    BP Location  Left arm 03/27/18 0202   Weight Method  Bed scale 03/20/18 0456    Patient Position  Lying 03/04/18 1139   Bed Scale  Standard (fitted sheet, draw sheet/ pad, cover/flat sheet, " blanket, two pillows) 03/13/18 0442    OXYGEN THERAPY     BSA (Calculated - sq m)  1.91 02/27/18 1213    SpO2  94 % 03/27/18 1118   BMI (Calculated)  24.42 02/27/18 1213    O2 Device  None (Room air) 03/27/18 1118   POSITIONING      FiO2 (%)  40 % 03/18/18 1548   Body Position  independently positioning 03/27/18 0924    Oxygen Delivery  1 LPM 03/20/18 1529   Head of Bed (HOB)  HOB at 30 degrees 03/27/18 0924    Suction Occurrance  -- (suctioned ETT/ORAL) 03/18/18 1534   Positioning/Transfer Devices  pillows;in use 03/26/18 0623    PAIN/COMFORT     Chair  Shifted weight 03/23/18 1013    Patient Currently in Pain  denies 03/27/18 0756   DAILY CARE      Preferred Pain Scale  CPOT (Critical-Care Pain Observation Tool) 03/20/18 0426   Activity Management  ambulated to bathroom 03/27/18 1110    Patient's Stated Pain Goal  Unable to Assess 03/17/18 1443   Activity Assistance Provided  assistance, 1 person 03/27/18 1110    0-10 Pain Scale  -- (LEANN VENTED SEDATED) 03/17/18 0916   Assistive Device Utilized  gait belt;walker 03/27/18 1110    Assessment Indicator  patient not able 03/23/18 0955   ECG      Increased Vital Signs  0 03/23/18 0955   ECG Rhythm  Atrial fibrillation 03/20/18 1615    Expression  0 03/23/18 0955   Ectopy  None 03/20/18 0221    Sleepless  0 03/23/18 0955   Lead Monitored  Lead II;V 1 03/20/18 0426    Pain Location  Other (Comment) (LEANN) 03/17/18 0916   Equipment  electrodes changed;telemetry batteries changed 03/21/18 1037    Pain Orientation  Other (Comment) (LEANN) 03/17/18 0916   RESPIRATORY MONITORING      Pain Descriptors  Patient unable to describe 03/17/18 0916   Respiratory Monitoring (EtCO2)  -- (unable to apply to patient) 03/04/18 1219    Pain Management Interventions  pillow support provided 03/17/18 0511   Integrated Pulmonary Index (IPI)  -- (unable to apply to patient) 03/04/18 1219    Alex Scale Score  -- (PATIENT WEAN DOWN FRM SEDATION) 03/17/18 0916   POINT OF CARE TESTING      Pain  Intervention(s)  Repositioned 18 1828   Puncture Site  fingertip 18 1608    Response to Interventions  Absence of nonverbal indicators of pain 18 0517   Bedside Glucose (mg/dl )   105 mg/dl 18 0540    CRITICAL-CARE PAIN OBSERVATION TOOL (CPOT)     RYAN COMA SCALE      Facial Expression  0 18 1759   Best Eye Response  4-->(E4) spontaneous 18 1759    Body Movements  0 18 1759   Best Motor Response  6-->(M6) obeys commands 18 1759    Compliance w/ventilator (intubated patients)  Extubated 18 0426   Best Verbal Response  5-->(V5) oriented (oriented to self and ) 18 1759    Vocalization (extubated patients)  1 18 1759   Ryan Coma Scale Score  15 18 1759    Muscle Tension  0 18 1759   Assessment Qualifiers  patient not sedated/intubated 18 0348    Total  0 18 0426                 Patient Lines/Drains/Airways Status    Active LINES/DRAINS/AIRWAYS     Name: Placement date: Placement time: Site: Days: Last dressing change:    Pressure Injury 18 Left;Right Ischial tuberosity reddened 18   0930    23     Wound 18 Left Arm Skin tear 18   1158   Arm   22     Wound 18 Right Arm Skin tear 18   1147   Arm   20     Wound 18 Right Chest Skin tear  18   1313   Chest   20     Wound 18 Left;Upper Arm Skin tear 18   1339   Arm   20     Wound 18 Upper Lip Skin tear Scab/Skin Tear on upper lip  18   0502   Lip   18     Wound 18 Posterior Buttocks Other (comment) Blister and broken area of skin 18   1600   Buttocks   17     Wound 18 Right Cheek Skin tear small skin tear from e- tab removal 18   1600   Cheek   8     Incision/Surgical Site 12/01/15 Right Groin 12/01/15   0840    847     Incision/Surgical Site 17 Left Groin 17   1908    290     Incision/Surgical Site 18 Abdomen 18   1128    22     Incision/Surgical Site  03/04/18 Midline Abdomen 03/04/18   1129    22             Patient Lines/Drains/Airways Status    Active PICC/CVC     Name: Placement date: Placement time: Site: Days: Additional Info Last dressing change:    PICC Triple Lumen 03/06/18 Right Basilic 03/06/18   1238   Basilic   20 External Cath Length (cm): 6 cm   03/20/18 1000 (169.53 hrs)         Size (Fr): 5 Fr            Orientation: Right            Extremity Circumference (cm): 24 cm            Catheter Brand:  bard            Dressing Intervention: Chlorhexidine patch;Transparent;Securing device            Description: Valved;Power PICC            Total Catheter Length (cm) Trimmed: 43 cm            Site Prep: Chlorhexidine            Local Anesthetic: Injectable            Inserted by: BETSY Damon RN            Insertion attempts with ultrasound: 1            Patient Tolerance: Tolerated well            Placement Verification: Xray;Blood Return            Difficulty with threading line: No            Tip location: SVC            Full barrier precautions done: Yes            Consent Signed: Yes            Time Out performed: Yes            Lot #: [id number]               Intake/Output Detail Report     Date Intake             Output     Net    Shift P.O. I.V. NG/GT IV Piggyback TPN Colloid Enteral Total Urine Emesis/NG output Other Total       Noc 03/25/18 2300 - 03/26/18 0659 -- -- -- -- -- -- -- -- -- -- -- -- 0    Day 03/26/18 0700 - 03/26/18 1459 60 -- -- -- -- -- -- 60 -- -- -- -- 60    Shayla 03/26/18 1500 - 03/26/18 2259 -- -- -- -- -- -- -- -- -- -- -- -- 0    Noc 03/26/18 2300 - 03/27/18 0659 60 -- -- -- -- -- -- 60 -- -- -- -- 60    Day 03/27/18 0700 - 03/27/18 1459 240 -- -- -- -- -- -- 240 100 -- -- 100 140      Last Void/BM       Most Recent Value    Urine Occurrence 1 at 03/27/2018 1110    Stool Occurrence 1 [med soft] at 03/25/2018 1142      Case Management/Discharge Planning     Case Management/Discharge Planning Flowsheet     REFERRAL INFORMATION      Expected Discharge Date  03/27/18 03/26/18 1510    Admission Type  inpatient 03/08/18 1547   ASSESSMENT/CONCERNS TO BE ADDRESSED      Arrived From  home or self-care 03/08/18 1547   Concerns To Be Addressed  adjustment to diagnosis/illness concerns 03/26/18 1510    Reason For Consult  discharge planning 03/08/18 1547   Concerns Comments  Patient to be discussing TCU options with dtr 03/26/18 1510    Record Reviewed  plan of care 03/08/18 1547   DISCHARGE PLANNING      CTS Assigned to Case  Hayley Rivero 03/27/18 1114   Patient/family verbalizes understanding of discharge plan recommendations?  Yes 03/26/18 1510    LIVING ENVIRONMENT     Transportation Available  car;family or friend will provide (pt reports drives at baseline, daughter transported to ED) 03/03/18 1627    Lives With  alone 03/08/18 1547   PATIENT PLACEMENT INFORMATION      Living Arrangements  condominium 03/08/18 1547   Facility 1 Name  [facility] 03/27/18 1125    Provides Primary Care For  no one 03/08/18 1547   Facility 1 Reason for Decline  [facility] 03/27/18 1125    Quality Of Family Relationships  supportive;involved 03/08/18 1547   Facility 2 Name  [facility] 03/27/18 1125    Able to Return to Prior Living Arrangements  no 03/08/18 1547   Facility 2 Reason for Decline  [facility] 03/27/18 1125    HOME SAFETY     [facility] 3 Name  [facility] 03/27/18 1125    Patient Feels Safe Living in Home?  yes 03/08/18 1547   FINAL NOTE      ASSESSMENT OF FAMILY/SOCIAL SUPPORT     Final Note  DC to [facility] 03/27/18 1125    Marital Status   03/08/18 1548   FINAL RESOURCES      Who is your support system?  Children;Wife 03/08/18 1548   Equipment Currently Used at Home  walker, rolling (one comfort ht toilet stool (another std),  4ww, FWW) 03/03/18 1627    Description of Support System  Supportive;Involved 03/08/18 1547   Resources List  Transitional Care 03/27/18 1125    Support Assessment  Adequate family and caregiver support 03/08/18  1547   Referrals Placed  TCU 03/27/18 1125    Quality of Family Relationships  supportive;involved 03/08/18 1547   ABUSE RISK SCREEN      Communication preferences  phone 03/08/18 1547   QUESTION TO PATIENT:  Has a member of your family or a partner(now or in the past) intimidated, hurt, manipulated, or controlled you in any way?  no 02/27/18 1214    COPING/STRESS     QUESTION TO PATIENT: Do you feel safe going back to the place where you are living?  yes 02/27/18 1214    Major Change/Loss/Stressor  hospitalization 03/08/18 1547   OBSERVATION: Is there reason to believe there has been maltreatment of a vulnerable adult (ie. Physical/Sexual/Emotional abuse, self neglect, lack of adequate food, shelter, medical care, or financial exploitation)?  no 02/27/18 1214    Patient Personal Strengths  strong support system 03/08/18 1547   OTHER      Sources Of Support  adult child(lyssa);spouse 03/08/18 1548   Are you depressed or being treated for depression?  No 02/27/18 1629    Reaction To Health Status  unable to assess 03/08/18 1547   HOMICIDE RISK      Understanding Of Condition And Treatment  adequate understanding of medical condition;unable to assess 03/08/18 1547   Feels Like Hurting Others  no 02/27/18 1214    EXPECTED DISCHARGE                         [place] [address] SURGICAL SPECIALITIES: [phone]            Medication Administration Report for Joseluis Falcon as of 03/27/18 1132   Legend:    Given Hold Not Given Due Canceled Entry Other Actions    Time Time (Time) Time  Time-Action       Inactive    Active    Linked        Medications 03/21/18 03/22/18 03/23/18 03/24/18 03/25/18 03/26/18 03/27/18    acetaminophen (TYLENOL) solution 650 mg  Dose: 650 mg  Freq: EVERY 4 HOURS PRN Route: ORAL OR FEED  PRN Reason: mild pain  Start: 03/23/18 0308   Admin Instructions: Maximum acetaminophen dose from all sources= 75 mg/kg/day not to exceed 4 grams/day.       0314 (650 mg)-Given               acetaminophen (TYLENOL)  "tablet 650 mg  Dose: 650 mg  Freq: EVERY 4 HOURS PRN Route: PO  PRN Reason: mild pain  Start: 03/23/18 1545   Admin Instructions: Maximum acetaminophen dose from all sources = 75 mg/kg/day not to exceed 4 grams/day.              Or  acetaminophen (TYLENOL) Suppository 650 mg  Dose: 650 mg  Freq: EVERY 4 HOURS PRN Route: RE  PRN Reason: mild pain  Start: 03/23/18 1545   Admin Instructions: Maximum acetaminophen dose from all sources = 75 mg/kg/day not to exceed 4 grams/day.               apixaban ANTICOAGULANT (ELIQUIS) tablet 5 mg  Dose: 5 mg  Freq: 2 TIMES DAILY Route: PO  Start: 03/26/18 2100         (2057)-Not Given [C]        0815 (5 mg)-Given       [ ] 2100           artificial saliva (BIOTENE MT) spray 1 spray  Dose: 1 spray  Freq: EVERY 6 HOURS PRN Route: MT  PRN Reason: dry mouth  Start: 03/05/18 1107              famotidine (PEPCID) tablet 10 mg  Dose: 10 mg  Freq: 2 TIMES DAILY Route: PO  Start: 03/25/18 2100 2116 (10 mg)-Given        0824 (10 mg)-Given       (2057)-Not Given [C]        0815 (10 mg)-Given       [ ] 2100           hydrALAZINE (APRESOLINE) injection 10 mg  Dose: 10 mg  Freq: EVERY 6 HOURS PRN Route: IV  PRN Comment: Treat SBP > 170  Start: 03/16/18 1502   Admin Instructions: Treat SBP > 170; hold for BP < 100  For ordered doses up to 40 mg, give IV Push undiluted over 1 minute.               lidocaine (LMX4) cream  Freq: EVERY 1 HOUR PRN Route: Top  PRN Reason: pain  PRN Comment: with VAD insertion or accessing implanted port.  Start: 03/20/18 1908   Admin Instructions: Do NOT give if patient has a history of allergy to any local anesthetic or any \"asael\" product.   Apply 30 minutes prior to VAD insertion or port access.  MAX Dose:  2.5 g (  of 5 g tube)               lidocaine 1 % 1 mL  Dose: 1 mL  Freq: EVERY 1 HOUR PRN Route: OTHER  PRN Comment: mild pain with VAD insertion or accessing implanted port  Start: 03/20/18 1908   Admin Instructions: Do NOT give if patient has a history " "of allergy to any local anesthetic or any \"asael\" product. MAX dose 1 mL subcutaneous OR intradermal in divided doses.               magnesium sulfate 2 g in NS intermittent infusion ([facility] or [facility])  Dose: 2 g  Freq: DAILY PRN Route: IV  PRN Reason: magnesium supplementation  Last Dose: 2 g (03/22/18 1040)  Start: 03/20/18 0342   Admin Instructions: For Serum Mg++ 1.6 - 2 mg/dL  Give 2 g and recheck magnesium level next AM.      1040 (2 g)-New Bag        0532 (2 g)-New Bag               magnesium sulfate 4 g in 100 mL sterile water (premade)  Dose: 4 g  Freq: EVERY 4 HOURS PRN Route: IV  PRN Reason: magnesium supplementation  Start: 03/20/18 0342   Admin Instructions: For serum Mg++ less than 1.6 mg/dL  Give 4 g and recheck magnesium level 2 hours after dose, and next AM.               magnesium sulfate 4 g in 100 mL sterile water (premade)  Dose: 4 g  Freq: EVERY 4 HOURS PRN Route: IV  PRN Reason: magnesium supplementation  Start: 03/05/18 1606   Admin Instructions: For serum Mg++ less than 1.6 mg/dL  Give 4 g and recheck magnesium level 2 hours after dose, and next AM.               naloxone (NARCAN) injection 0.1-0.4 mg  Dose: 0.1-0.4 mg  Freq: EVERY 2 MIN PRN Route: IV  PRN Reason: opioid reversal  Start: 03/07/18 0537   Admin Instructions: For respiratory rate LESS than or EQUAL to 8.  Partial reversal dose:  0.1 mg titrated q 2 minutes for Analgesia Side Effects Monitoring Sedation Level of 3 (frequently drowsy, arousable, drifts to sleep during conversation).Full reversal dose:  0.4 mg bolus for Analgesia Side Effects Monitoring Sedation Level of 4 (somnolent, minimal or no response to stimulation).  For ordered doses up to 2mg give IVP. Give each 0.4mg over 15 seconds in emergency situations. For non-emergent situations further dilute in 9mL of NS to facilitate titration of response.               nitroGLYcerin (NITROSTAT) sublingual tablet 0.4 mg  Dose: 0.4 mg  Freq: EVERY 5 MIN PRN Route: SL  PRN " Reason: chest pain  Start: 03/20/18 1908   Admin Instructions: Maximum 3 doses in 15 minutes.  Notify provider if no relief after 3 doses.    Do NOT give nitroglycerin SL IF the patient has taken avanafil (STENDRA), sildenafil (VIAGRA) or (REVATIO) within the last 8 hours, vardenafil (LEVITRA) or (STAXYN) within the last 18 hours, tadalafil (CIALIS) or (ADCIRCA) within the last 36 hours. Inform provider if patient has taken one of these medications.  If patient is still having acute angina requiring treatment, an alternative treatment option may be used such as: IV beta-blocker [2.5 mg - 5 mg metoprolol (LOPRESSOR)] if ordered by a provider.               ondansetron (ZOFRAN) injection 4 mg  Dose: 4 mg  Freq: EVERY 6 HOURS PRN Route: IV  PRN Reasons: nausea,vomiting  Start: 02/27/18 1013   Admin Instructions: This is Step 1 of nausea and vomiting management.  If nausea not resolved in 15 minutes, go to Step 2 prochlorperazine (COMPAZINE).  Irritant. For ordered doses up to 4 mg, give IV Push undiluted over 2-5 minutes.               prochlorperazine (COMPAZINE) injection 5 mg  Dose: 5 mg  Freq: EVERY 6 HOURS PRN Route: IV  PRN Reasons: nausea,vomiting  Start: 02/27/18 1553   Admin Instructions: This is Step 2 of nausea and vomiting management. Give if nausea not resolved 15 minutes after giving ondansetron (ZOFRAN). If nausea not resolved in 15 minutes, go to Step 3 metoclopramide (REGLAN), if ordered.  For ordered doses up to 10 mg, give IV Push undiluted. Each 5mg over 1 minute.               QUEtiapine (SEROquel) tablet 25 mg  Dose: 25 mg  Freq: 2 TIMES DAILY PRN Route: PO  PRN Comment: Agitation  Start: 03/27/18 1034              sodium chloride (PF) 0.9% PF flush 10-20 mL  Dose: 10-20 mL  Freq: EVERY 1 HOUR PRN Route: IK  PRN Reasons: line flush,post meds or blood draw  Start: 03/06/18 0907   Admin Instructions: to flush CVC - Open Ended (Tunneled and Non-Tunneled).  10 mL post IV meds; 20 mL post blood draw.      0559 (20 mL)-Given          0549 (10 mL)-Given              sodium chloride (PF) 0.9% PF flush 3 mL  Dose: 3 mL  Freq: EVERY 1 HOUR PRN Route: IK  PRN Reason: line flush  PRN Comment: for peripheral IV flush post IV meds  Start: 03/20/18 1908             Discontinued Medications  Medications 03/21/18 03/22/18 03/23/18 03/24/18 03/25/18 03/26/18 03/27/18         Dose: 2.5 mg  Freq: 2 TIMES DAILY Route: PO  Start: 03/25/18 2100   End: 03/26/18 1542        2116 (2.5 mg)-Given        0824 (2.5 mg)-Given       1542-Med Discontinued          Dose: 2 mg  Freq: EVERY 6 HOURS PRN Route: IV  PRN Reason: agitation  Start: 03/19/18 1215   End: 03/24/18 1342   Admin Instructions: For ordered doses up to 5 mg, give IV Push undiluted over 1 minute.     0840 (2 mg)-Given       1544 (2 mg)-Given        2052 (2 mg)-Given         1342-Med Discontinued            Dose: 0.2 mg  Freq: EVERY 1 HOUR PRN Route: IV  PRN Reason: moderate to severe pain  Start: 03/04/18 1345   End: 03/26/18 0707   Admin Instructions: For ordered doses up to 4 mg give IV Push undiluted. Administer each 2mg over 2-5 minutes.          0707-Med Discontinued          Dose: 0.5-1 mg  Freq: EVERY 8 HOURS PRN Route: IV  PRN Reason: anxiety  Start: 03/26/18 0715   End: 03/27/18 1022   Admin Instructions: For IV PUSH: Dilute with equal volume of NS. For ordered doses up to 4 mg give IV Push. Administer each 2mg over 1-5 minutes.          1901 (0.5 mg)-Given        1022-Med Discontinued         Dose: 0.5-1 mg  Freq: EVERY 4 HOURS PRN Route: IV  PRN Reason: anxiety  Start: 03/23/18 1543   End: 03/26/18 0708   Admin Instructions: For IV PUSH: Dilute with equal volume of NS. For ordered doses up to 4 mg give IV Push. Administer each 2mg over 1-5 minutes.       1652 (1 mg)-Given        0546 (1 mg)-Given       1753 (1 mg)-Given         0708-Med Discontinued          Dose: 2-4 mg  Freq: EVERY 2 HOURS PRN Route: IV  PRN Reason: severe pain  Start: 03/23/18 1544   End:  03/24/18 1344   Admin Instructions: For ordered doses up to 15 mg give IV Push undiluted over 4-5 minutes.        1344-Med Discontinued            Dose: 0.4 mg  Freq: DAILY Route: PO  Start: 03/25/18 1330   End: 03/26/18 1542   Admin Instructions: Administer 30 minutes after the same meal each day.         1351 (0.4 mg)-Given        (1104)-Not Given [C]       1542-Med Discontinued     Medications 03/21/18 03/22/18 03/23/18 03/24/18 03/25/18 03/26/18 03/27/18               INTERAGENCY TRANSFER FORM - NOTES (H&P, Discharge Summary, Consults, Procedures, Therapies)   2/27/2018                       [place] [address] [facility]: [phone]            History & Physicals     No notes of this type exist for this encounter.      Discharge Summaries     No notes of this type exist for this encounter.         Consult Notes      Consults by Sailaja Keenan MD at 3/23/2018 11:27 AM     Author:  Sailaja Keenan MD Service:  Palliative Author Type:  Physician    Filed:  3/23/2018  3:19 PM Date of Service:  3/23/2018 11:27 AM Creation Time:  3/23/2018 11:10 AM    Status:  Addendum :  Sailaja Keenan MD (Physician)         [facility]    Palliative Care Consultation Note    Patient: Joseluis Falcon  Date of Admission:  2/27/2018    Requesting provider/team: Dr Aguillon  Reason for consult: Goals of care    Recommendations:  Please see assessment below for rationale.[AN1.1]  - transition to comfort cares   - discussed with daughter that antibiotics, artificial hydration and nutrition and all other medications that don't directly provide comfort will be discontinued    - daughter is interested in transferring her father to [facility] in [place], where his wife is. Care coordinator aware.   - please refer to hospice. [facility] might cooperate with a certain agency. Please discuss with SW[AN1.2]    Thank you for the opportunity to participate in the care  of this patient and family. Our team will follow. Please feel free to contact the on-call Palliative provider with any urgent needs.[AN1.1]     Sailaja Keenan[AN1.3]  Pager: [phone]  [facility] Inpatient Team Consult pager [phone] (M-F 8-4:30)  After-hours Answering Service [phone]   Palliative Clinic: [phone]       Assessment:  Joseluis Falcon is a 85 year old male with PMHx significant for bilateral inguinal herniorrhaphy and psoriasis, admitted 2/27 for small bowel obstruction.  Now status post surgical lysis of adhesions and partial small bowel resection on 3/4.  Postoperative course was complicated by extended intubation, aspiration pneumonia, ICU delirium.  At this time he remains confused, complicated by dysphasia with apparent aspiration.  He has been receiving tube feedings, which I held now due to aspiration risk.    Symptoms:[AN1.1]   Pt appears comfortable.[AN1.2]    Social:[AN1.1]         Living situation: lived independently until admission. His wife is in a NH since a fall a year ago. She is doing relatively well, but unable to return to independent living.   He had been driving to visit her daily, did his own grocery shopping and took care of the home.         Support system: Their daughter Prema has been the main source of support.         Actual/Potential Caregiver: Prema[AN1.2]    Coping:[AN1.1] Prema shares her sadness about her father's condition and sudden decline, but is accepting. She has discussed his condition with her mother and is certain that he would not want further aggressive cares, namely no trach or artificial nutrition/hydration[AN1.2]        Prognostic Information:[AN1.1] Unless Edison regains his ability to swallow and take in meaningful amounts of hydration and nutrition, his prognosis is likely limited to days up to maybe very few weeks.[AN1.2]   This has been discussed with[AN1.1] Prema[AN1.2]. We discussed the inherent uncertainty. Especially considering his sudden  "change in functional status there is a slight chance he might regain some function and live longer. I did recommend to offer family from out of town the opportunity to visit, though.[AN1.4]    Advance Care Planning: DNR/DNI.  Changed after conversation by daughter Prema with Dr. Guidry.  Please see documentation dated 3/20 2:14 PM[AN1.1]    History of Present Illness[AN1.3]   Sources of History:[AN1.1]electronic health record, patient's daughter and team    When I visit Edison's room he is asleep, eventually wakes up, but is not able to engage meaningfully. He has a 1:1 who states that he has appeared comfortable throughout.     I talk to his daughter Prema on the phone. She is aware of his condition and has a good understanding of his disease course. She shares how sudden this has happened and that he was independent and active up until this admission. She asked him about his wishes in the ED, when he said he wants to \"fight for his life\". He reiterated that sentiment several days later leading up to his surgery. We discuss that when he refers to his life he likely was envisioning independence and the ability to meaningfully interact with his family. She expresses that both she and her mother feel certain that he would not want to pursue further aggressive treatments, but to transition to comfort cares.[AN1.2]       ROS:  Palliative Symptom Review (0=no symptom/no concern, 1=mild, 2=moderate, 3=severe):[AN1.1]  Unable to obtain 2/2 mental status[AN1.2]       Past Medical History:[AN1.1]   Past Medical History:   Diagnosis Date     Arthritis      Carlson's esophagus      GERD (gastroesophageal reflux disease)      Hard of hearing      Psoriasis      Skin cancer[AN1.3]           Past Surgical History:[AN1.1]   Past Surgical History:   Procedure Laterality Date     HERNIORRHAPHY INGUINAL Right 12/1/2015    Procedure: HERNIORRHAPHY INGUINAL;  Surgeon: Malcom Rasmussen MD;  Location: [facility]     HERNIORRHAPHY INGUINAL " Left 6/9/2017    Procedure: HERNIORRHAPHY INGUINAL;  OPEN LEFT INGUINAL HERNIA REPAIR WITH MESH ;  Surgeon: Aden Haro MD;  Location:  OR     LAPAROSCOPIC LYSIS ADHESIONS N/A 3/4/2018    Procedure: LAPAROSCOPIC LYSIS ADHESIONS;;  Surgeon: Robel Gomez MD;  Location:  OR     LAPAROSCOPIC RESECTION SMALL BOWEL N/A 3/4/2018    Procedure: LAPAROSCOPIC RESECTION SMALL BOWEL;;  Surgeon: Robel Gomez MD;  Location:  OR     LAPAROSCOPY DIAGNOSTIC (GENERAL) N/A 3/4/2018    Procedure: LAPAROSCOPY DIAGNOSTIC (GENERAL);  EXPLORATORY LAPAROSCOPY LINDA LYSIS OF ADHESION; SMALL BOWEL RESECTION;  Surgeon: Robel Gomez MD;  Location:  OR[AN1.3]             Family History:[AN1.1]   History reviewed. No pertinent family history.[AN1.3]  Family history[AN1.1] reviewed[AN1.2]       Allergies:[AN1.1]   Allergies   Allergen Reactions     Seasonal Allergies      Protonix [Pantoprazole] Rash[AN1.3]            Medications:   I have reviewed this patient's medication profile and medications given in the past 24 hours.  Acetaminophen as needed-once  Hydromorphone 0.2 mg IV every hour as needed-none    Quetiapine 25 mg 3 times daily--a.m. dose held due to aspiration risk  Haldol 2 mg IV as needed agitation-once at 9 PM    Ondansetron as needed-none           Physical Exam:   Vital Signs:[AN1.1] Temp: 98.3  F (36.8  C) Temp src: Axillary BP: 109/56 Pulse: 101 Heart Rate: 113 Resp: 20 SpO2: 99 % O2 Device: None (Room air)[AN1.3]    Weight:[AN1.1] 140 lbs 6.93 oz[AN1.3]    Physical Exam:[AN1.1]   CONSTIT: asleep, eventually arouses to voice and touch, appears comfortable  EENT: MMM, NGT in place, NC in place, EOMI, no icterus  RESP: reg, nl effort, some pharyngeal secretions, diffuse b/l coarse breath sounds  CARDIOVASC: irreg, no m/r/g  GI: mild distention, no obvious tenderness, no bowel sounds   :catheter in place, draining clear urine  MSK: moves x4, no edema, generalized weakness  SKIN:  warm,  no rash, no obvious lesions  NEURO: follows commands, unclear state of orientation, mumbles. Good eye contact[AN1.5]       Data reviewed:   GFR >90  Albumin 1.9[AN1.1]    Sailaja Keenan[AN1.3]  Pager:  [phone]  [facility] Inpatient Team Consult pager [phone] (M-F 8-4:30)  After-hours Answering Service [phone]  Palliative Clinic: [phone]     Total time spent was[AN1.1] 90[AN1.2] minutes,  >50% of time was spent counseling and/or coordination of care regarding[AN1.1] treatment plan[AN1.2].[AN1.1]  I spent[AN1.5] 30 minutes[AN1.2] talking to Edison's daughter Prema on the phone[AN1.5] 14:30-15:00[AN1.2]           Revision History        User Key Date/Time User Provider Type Action    > [N/A] 3/23/2018  3:19 PM Sailaja Keenan MD Physician Addend     AN1.4 3/23/2018  3:17 PM Sailaja Keenan MD Physician Sign     AN1.2 3/23/2018  2:35 PM Sailaja Keenan MD Physician      AN1.5 3/23/2018 11:41 AM Sailaja Keenan MD Physician      AN1.3 3/23/2018 11:16 AM Sailaja Keenan MD Physician      AN1.1 3/23/2018 11:10 AM Sailaja Keenan MD Physician             Consults by Wendi Swanson RD, LD at 3/16/2018  1:59 PM     Author:  Wendi Swanson RD, LD Service:  Nutrition Author Type:  Registered Dietitian    Filed:  3/16/2018  1:59 PM Date of Service:  3/16/2018  1:59 PM Creation Time:  3/16/2018  1:47 PM    Status:  Signed :  Wendi Swanson RD, LD (Registered Dietitian)     Consult Orders:    1. Nutrition Services Adult IP Consult [[id number]] ordered by Kajal Leo MD at 03/16/18 6197                BRIEF NUTRITION NOTE:    Was asked to initiate TF for pt.  A full Nutrition Reassessment was completed 3/15.  See note for details.    NEW FINDINGS:  Surgery has indicated via [place] feeding tube  Plan for P/P FT placement today per Brooklyn Maguire NP.    Propofol running at 5.9 mL/hr = 156 kcals (lipid).  TPN D15 AA5 at 90 mL/hr + Lipids  250 mL every other day = 1784 kcal, 108 g protein (1.6 g/kg), 324 g CHO, 25% fat   Total (TPN/Lipid + Propofol):  1940 kcals (30 kcal/kg).    INTERVENTIONS:  Enteral Nutrition - Initiate TF Promote with Fiber at 10 mL/hr to provide:  240 kcal, 15 g protein, 33 g CHO, 3 g fiber, 199 mL H2O  Total (TPN/Lipid + TF + Propofol)= 2180 kcal (34 kcal/kg), 123 g protein (1.9 g/kg)  Feeding tube flush - Ordered minimal H20 flushes for now, 30 mL every 4 hrs    RECOMMENDATIONS:  Eventual TF goal would be Promote with Fiber at 80 mL/hr to provide:  1920 kcal (30 kcal/kg), 120 g protein (1.9 g/kg), 265 g CHO, 27 g fiber, 1594 mL H2O  Total with Propofol = 2076 kcal (32 kcal/kg).  Would taper TPN as TF rate increases (RD to follow up over week-end).[MH1.1]    Wendi Swanson[MH1.2], JOHN CACERES, [facility][MH1.1]         Revision History        User Key Date/Time User Provider Type Action    > MH1.2 3/16/2018  1:59 PM Wendi Swanson RD, LD Registered Dietitian Sign     MH1.1 3/16/2018  1:47 PM Wendi Swanson RD, LD Registered Dietitian             Consults signed by Ruby Malloy MD at 3/6/2018  3:08 PM      Author:  Ruby Malloy MD Service:  Electrophysiology Author Type:  Physician    Filed:  3/6/2018  3:08 PM Date of Service:  3/6/2018 10:51 AM Creation Time:  3/6/2018 11:34 AM    Status:  Signed :  Ruby Malloy MD (Physician)         Consult Date:  03/06/2018      CARDIAC ELECTROPHYSIOLOGY CONSULTATION       REQUESTING PHYSICIAN:  Juventino Lauren Hospitalist.        REASON FOR CONSULTATION:  Atrial fibrillation/flutter with rapid ventricular response.      HISTORY OF PRESENT ILLNESS:    It is my pleasure seeing Mr. Joseluis Falcon in EP consultation.  This 85-year-old gentleman is critically ill at this point.  He was admitted on 02/27/2018 with abdominal pain and vomiting.  He was diagnosed with small-bowel obstruction.  He was initially treated conservatively but due to lack of  improvement he underwent laparoscopic lysis of adhesions with partial small bowel resection on 03/04/2018.  Around the time of surgery he had a run of supraventricular tachycardia.      Earlier today the patient developed rapid atrial fibrillation in the 160s.  At some point his ECG was consistent with atrial flutter.  Heart rate was as high as 160.  He was placed on intravenous diltiazem but that resulted in severe hypotension.  By the time I saw the patient on station 33 this am his blood pressure was only 70.  The iv diltiazem was discontinued.      Unfortunately, the patient is very confused this am.  As far as I can tell he did not have previous cardiac history.  There are several metabolic abnormalities, most notably a sodium level 153.  His RN told me he was confused even before his sodium level became that high.  He denies chest pain but does appear dyspneic.        DIAGNOSTIC STUDIES:    Sodium 153, potassium 4.1, creatinine 1.37.  Lactic acid 1.4.  TSH 1.72.  Troponin less than 0.01.  White count 7100, hematocrit 43%, platelets 147,000.      His 12-lead ECG shows either atrial fibrillation or atrial flutter (both arrhythmias have been documented) with RVR as high as 160 beats per minute.  There is a right bundle branch block on all ECGs. I note on an ECG on admission 02/27/2017 there was a hint of ST elevation in the inferior leads.  There is also evidence of Q-waves there.  I do not see ST elevation currently.     Echocardiogram was ordered and results are pending.     The patient had CT with contrast earlier today because of tachypnea and hypoxia.  There were mild but extensive irregular interstitial opacities and some associated honeycombing.  No pulmonary emboli.      Echocardiogram has been ordered and results are pending.        IMPRESSION:    1.  Atrial fibrillation/atrial flutter with very rapid ventricular response.  Mr. Falcon is critically ill.  He presented with small-bowel obstruction and  "underwent a surgical procedure on 03/04/2018, see above.  He is currently hypotensive, tachypneic and tachycardic.        RECOMMENDATIONS:   1.  Stop intravenous diltiazem due to severe hypotension.   2.  Give IV digoxin 0.25 mg immediately and reassess later for a possible second dose.   3.  Start amiodarone with very slow initial infusion/bolus followed by a continuous infusion.  There is a chance amiodarone will convert the patient.  If not, we would consider cardioversion tomorrow.   4.  Echocardiogram was ordered and pending.   5.  Chest x-ray was ordered and pending.  The patient is tachypneic and could be in pulmonary edema at this time.   6.  Communicate with General Surgery whether it is acceptable to use intravenous heparin (without bolus).         Critical care time spent was 45 minutes.         MAXIMUS CARDENAS MD, [facility]           Physical Exam:  Vitals:[DI1.1] /73 (BP Location: Left leg)  Pulse 145  Temp 98.8  F (37.1  C)  Resp (!) 32  Ht 1.753 m (5' 9\")  Wt 74.8 kg (164 lb 14.5 oz)  SpO2 91%  BMI 24.35 kg/m2[DI1.2]  Intake/Output Summary (Last 24 hours) at 03/06/18 1456  Last data filed at 03/06/18 1254   Gross per 24 hour   Intake          1964.29 ml   Output             3475 ml   Net         -1510.71 ml[DI1.1]     Vitals:    02/27/18 1212 02/28/18 0700   Weight: 74.8 kg (165 lb) 74.8 kg (164 lb 14.5 oz)[DI1.2]     Constitutional:  Alert but disoriented.  No relatives in room.   HEAD: Normocephalic.  Cachectic appearing elderly male, dyspneic.  SKIN: Skin normal color, texture and turgor with no lesions or eruptions.  Eyes: PERRL, EOMI.  ENT:  Supple, normal JVP. No lymphadenopathy or thyroid enlargement.  Chest:  Decreased breath sounds biltaerally.  Pt is only taking shallow breaths.   Cardiac:  irreg and very tachy, summation gallop.  Abdomen:  Decreased BS.   Extremities:  Pedious pulses palpable B/L.  No LE edema noticed.   Neurological:  Confused.        Review of " Systems:  Complete review of system could not be completed d/t confusion.     CURRENT MEDICATIONS:[DI1.1]    sodium chloride 0.9%  500 mL Intravenous Once     metoprolol  5 mg Intravenous Once     sodium chloride (PF)  3 mL Intracatheter Q8H     piperacillin-tazobactam  3.375 g Intravenous Q6H     sodium chloride (PF)  10 mL Intracatheter Q8H     amiodarone  150 mg Intravenous Once     famotidine  20 mg Intravenous Q24H     lipids  250 mL Intravenous Every Other Day     [START ON 3/7/2018] insulin aspart  1-12 Units Subcutaneous Q4H     QUEtiapine  12.5 mg Oral At Bedtime     OLANZapine  5 mg Intramuscular Once[DI1.2]     ALLERGIES[DI1.1]  Allergies   Allergen Reactions     Seasonal Allergies      Protonix [Pantoprazole] Rash[DI1.2]       PAST MEDICAL HISTORY:[DI1.1]  Past Medical History:   Diagnosis Date     Arthritis      Carlson's esophagus      GERD (gastroesophageal reflux disease)      Hard of hearing      Psoriasis      Skin cancer[DI1.2]        PAST SURGICAL HISTORY:[DI1.1]  Past Surgical History:   Procedure Laterality Date     HERNIORRHAPHY INGUINAL Right 12/1/2015    Procedure: HERNIORRHAPHY INGUINAL;  Surgeon: Malcom Rasmussen MD;  Location: [facility]     HERNIORRHAPHY INGUINAL Left 6/9/2017    Procedure: HERNIORRHAPHY INGUINAL;  OPEN LEFT INGUINAL HERNIA REPAIR WITH MESH ;  Surgeon: Aden Haro MD;  Location:  OR     LAPAROSCOPIC LYSIS ADHESIONS N/A 3/4/2018    Procedure: LAPAROSCOPIC LYSIS ADHESIONS;;  Surgeon: Robel Gomez MD;  Location:  OR     LAPAROSCOPIC RESECTION SMALL BOWEL N/A 3/4/2018    Procedure: LAPAROSCOPIC RESECTION SMALL BOWEL;;  Surgeon: Robel Gomez MD;  Location:  OR     LAPAROSCOPY DIAGNOSTIC (GENERAL) N/A 3/4/2018    Procedure: LAPAROSCOPY DIAGNOSTIC (GENERAL);  EXPLORATORY LAPAROSCOPY LINDA LYSIS OF ADHESION; SMALL BOWEL RESECTION;  Surgeon: Robel Gomez MD;  Location:  OR[DI1.2]       FAMILY HISTORY:[DI1.1]  History reviewed. No  pertinent family history.[DI1.2]    SOCIAL HISTORY:[DI1.1]  Social History     Social History     Marital status:      Spouse name: N/A     Number of children: N/A     Years of education: N/A     Social History Main Topics     Smoking status: Former Smoker     Quit date: 2/3/1964     Smokeless tobacco: Never Used     Alcohol use Yes      Comment: 1 drink daily     Drug use: None     Sexual activity: Not Asked     Other Topics Concern     None     Social History Narrative         Recent Labs  Lab 18  0857 18  0335 18  0813 18  0800 18  0810  18  0743   WBC  --  7.1 5.1  --   --   --  5.0   HGB  --  13.5 13.5 14.1  --   --  14.1   MCV  --  103* 102*  --   --   --  99   PLT  --  147* 144*  --   --   --  133*   NA  --  153* 152*  --  143  < > 139   POTASSIUM  --  4.1 4.3  --  4.6  < > 4.3   CHLORIDE  --  116* 119*  --  111*  < > 106   CO2  --  34* 30  --  26  < > 28   BUN  --  31* 32*  --  36*  < > 27   CR  --  1.37* 1.15  --  1.25  < > 1.09   ANIONGAP  --  3 3  --  6  < > 5   TOBI  --  8.2* 8.0*  --  8.4*  < > 8.5   GLC  --  140* 114*  --  166*  < > 130*   TROPI <0.015  --   --   --   --   --   --    < > = values in this interval not displayed.[DI1.2]         D: 2018   T: 2018   MT: JIGNESH      Name:     ISRAEL PERDOMO   MRN:      [id number]        Account:       [id number]   :      1932           Consult Date:  2018      Document: [id number]       cc: Danny Diop MD[DI1.1]        Revision History        User Key Date/Time User Provider Type Action    > DI1.1 3/6/2018  3:08 PM Ruby Malloy MD Physician Sign     DI1.2 3/6/2018  2:56 PM Ruby Malloy MD Physician      [N/A] 3/6/2018 11:34 AM Ruby Malloy MD Physician Edit            Consults by Wendi Swanson RD, LD at 3/6/2018 12:29 PM     Author:  Wendi Swanson RD, LD Service:  Nutrition Author Type:  Registered Dietitian    Filed:  3/6/2018 12:29  PM Date of Service:  3/6/2018 12:29 PM Creation Time:  3/6/2018 12:04 PM    Status:  Signed :  Wendi Swanson RD, LD (Registered Dietitian)         CLINICAL NUTRITION SERVICES  -  ASSESSMENT NOTE      Recommendations Ordered by Registered Dietitian (RD):   3/6:  Step 1 - Begin TPN D15 AA5 at 50 mL/hr + Lipids 250 mL every other day = 1102 kcals, 60 gm pro (0.8 gm/kg), 180 gm CHO.  Decrease D5 IVF to 40 mL/hr = 48 gm CHO, 163 kcals.  Total (TPN/Lipid + D5 IVF):  1265 kcals (17 kcal/kg), 20% fat.    Step 2 - After 24 hrs, if labs acceptable (K, Mg, Phos), increase TPN to D15 AA5 at 70 mL/hr + Lipids 250 mL 5x/wk (Mon-Fri) = 1550 kcals, 84 gm pro (1.1 gm/kg), 252 gm CHO.  Decrease D5 IVF to 20 mL/hr = 24 gm CHO, 82 kcals.  Total (TPN/Lipid + D5 IVF):  1630 kcals (19 kcal/kg).    Step 3 - After 24 hrs, if labs acceptable (K, Mg, Phos) increase TPN to D15 AA5 at 90 mL/hr + Lipids 250 mL daily = 2034 kcals (27 kcal/kg), 108 gm pro (1.4 gm/kg), 324 gm CHO, 25% fat.  Stop D5 IVF.   Malnutrition:   % Weight Loss:  Unable to determine without recent history  % Intake:  </= 50% for >/= 5 days (severe malnutrition)  Subcutaneous Fat Loss:  Orbital region moderate depletion and Upper arm region moderate depletion  Muscle Loss:  Temporal region severe depletion and Clavicle bone region severe depletion  Fluid Retention:  None noted    Malnutrition Diagnosis: Severe malnutrition  In Context of:  Acute illness or injury  Environmental or social circumstances        REASON FOR ASSESSMENT  Joseluis Falcon is a 85 year old male seen by Registered Dietitian for Pharmacy/Nutrition to Start and Manage PN and LOS      NUTRITION HISTORY  - Unable to obtain nutrition history as pt sleepy and no family available.  Pt lives alone and wife is in a memory care facility.  Pt had complaints of constipation, N/V for a couple of days PTA.  No food allergies/intolerances noted.      CURRENT NUTRITION ORDERS  Diet Order:     NPO     Current  "Intake/Tolerance:  NPO since admission x 8 days.  Thus total days of inadequate nutrition likely ~ 10 days.  Pt has received D5 containing IVF since admission, ranging from -125 mL/hr.  This should help protect from refeeding syndrome.       PHYSICAL FINDINGS  Observed  Muscle Wasting - temporal, clavicle  Subcutaneous fat loss - arms, orbital  Obtained from Chart/Interdisciplinary Team  None noted    ANTHROPOMETRICS  Height:[MH1.1] 5' 9\"[MH1.2]  Admit Weight:  74.8 kg ([MH1.1]164 lbs 14.47 oz[MH1.2]) --> Pt looks to be at much lower weight (?)  Current Wt:  Not available[MH1.1]  Body mass index is 24.35 kg/(m^2).[MH1.2]  Weight Status:  Normal BMI  IBW:  72.7 kg  % IBW:  103%  Weight History:  As below, weight is up 12 kg over the past 9 months.[MH1.1]    Wt Readings from Last 20 Encounters:   02/28/18 74.8 kg (164 lb 14.5 oz)   06/09/17 62.8 kg (138 lb 8 oz)   05/25/17 65.8 kg (145 lb)   12/01/15 75.3 kg (166 lb)   11/23/15 80.3 kg (177 lb)   10/31/14 88 kg (194 lb)   02/03/14 86.2 kg (190 lb)[MH1.3]       LABS  Labs reviewed  Na 153 (H)   BUN 31 (H)  Cr 1.37 (H) - Pt with BJ (prerenal per MD)  K 4.1 (NL)  Mg 2.6 (NL)   Phos 3.0 (NL) - acceptable.    MEDICATIONS  Medications reviewed  D5 IVF at 75 mL/hr = 90 gm CHO, 306 kcals (4 kcal/kg)    Dosing Weight:  74.8 kg (admit wt)     ASSESSED NUTRITION NEEDS PER APPROVED PRACTICE GUIDELINES:  Estimated Energy Needs: (3 step progression d/t risk for refeeding syndrome)  Step 1:  5712-0199 kcals (15-20 kcal/kg)   Step 2:  1372-3816 kcals (20-25 kcal/kg)  Step 3:  6750-4234 kcals (25-30 kcal/kg)  Justification: maintenance  Estimated Protein Needs:   grams protein (1.3-1.6 g pro/Kg) justification: Repletion and hypercatabolism with acute illness, possible CRF  Estimated Fluid Needs:  2965-4718 mL (25-30 mL/kg)  Justification: maintenance and per provider pending fluid status    MALNUTRITION:  % Weight Loss:  Unable to determine without recent history  % " Intake:  </= 50% for >/= 5 days (severe malnutrition)  Subcutaneous Fat Loss:  Orbital region moderate depletion and Upper arm region moderate depletion  Muscle Loss:  Temporal region severe depletion and Clavicle bone region severe depletion  Fluid Retention:  None noted    Malnutrition Diagnosis: Severe malnutrition  In Context of:  Acute illness or injury  Environmental or social circumstances    NUTRITION DIAGNOSIS:  Inadequate protein-energy intake related to altered GI function s/p surgery as evidenced by limited nutritional intake over past 10 days, severe muscle wasting, and D5 IVF meeting only 16% energy and 0% protein needs.      NUTRITION INTERVENTIONS  Recommendations / Nutrition Prescription  3/6:  Step 1 - Begin TPN D15 AA5 at 50 mL/hr + Lipids 250 mL every other day = 1102 kcals, 60 gm pro (0.8 gm/kg), 180 gm CHO.  Decrease D5 IVF to 40 mL/hr = 48 gm CHO, 163 kcals.  Total (TPN/Lipid + D5 IVF):  1265 kcals (17 kcal/kg), 20% fat.    Step 2 - After 24 hrs, if labs acceptable (K, Mg, Phos), increase TPN to D15 AA5 at 70 mL/hr + Lipids 250 mL 5x/wk (Mon-Fri) = 1550 kcals, 84 gm pro (1.1 gm/kg), 252 gm CHO.  Decrease D5 IVF to 20 mL/hr = 24 gm CHO, 82 kcals.  Total (TPN/Lipid + D5 IVF):  1630 kcals (19 kcal/kg).    Step 3 - After 24 hrs, if labs acceptable (K, Mg, Phos) increase TPN to D15 AA5 at 90 mL/hr + Lipids 250 mL daily = 2034 kcals (27 kcal/kg), 108 gm pro (1.4 gm/kg), 324 gm CHO, 25% fat.  Stop D5 IVF.    Implementation  Nutrition education: Not appropriate at this time due to patient condition  Collaboration and Referral of Nutrition care - Discussed with Pharmacist my plan to begin Clinimix tonight.  PN Composition, PN Schedule - Entered TPN orders in [facility] as above.  Will keep total fluid (TPN + D5 IVF) = 90 mL/hr    Nutrition Goals  Pt will tolerate TPN without refeeding syndrome    MONITORING AND EVALUATION:  Progress towards goals will be monitored and evaluated per protocol and Practice  Guidelines[MH1.1]    Wendi CASH Sky[MH1.2], JOHN CACERES, [facility][MH1.1]             Revision History        User Key Date/Time User Provider Type Action    > MH1.3 3/6/2018 12:29 PM SkyWendi, RD, LD Registered Dietitian Sign     MH1.2 3/6/2018 12:05 PM SkyWendi SHREYA, RD, LD Registered Dietitian      MH1.1 3/6/2018 12:04 PM Sky Wendi SHREYA, NICKI, LD Registered Dietitian             Consults by Adebayo Gonzalez MD at 2/27/2018  5:05 PM     Author:  Adebayo Gonzalez MD Service:  Surgery Author Type:  Physician    Filed:  2/27/2018  5:05 PM Date of Service:  2/27/2018  5:05 PM Creation Time:  2/27/2018  5:00 PM    Status:  Signed :  Adebayo Gonzalez MD (Physician)     Consult Orders:    1. Surgery General  Consult: Patient to be seen: Routine - within 24 hours; Small Bowel Obstruction; Consultant may enter orders: Yes [[id number]] ordered by Mazin Melchor MD at 02/27/18 1416                [facility] Consultation/H&P    Joseluis Falcon MRN#: [id number]   Age: 85 year old YOB: 1932     Date of Admission:          2/27/2018  Reason for consult/H&P: Possible bowel obstruction   Surgeon:      Adebayo Gonzalez MD                  Chief Complaint:   Abdominal pain         History of Present Illness:   This patient is a 85 year old  male who presented to the [facility] ER with two days of abdominal pain and some vomiting.  He says he has no pain now and is hungry for supper.  He felt he was constipated and last moved his bowels three days ago.  He denies trauma to the abdomen.  He has not had similar episodes before.  He has no history of abdominal operations other than two inguinal hernia repairs.   He does not have any history of recent diarrhea fevers or chills.  He seems slightly confused when asked complex questions..   . History is obtained from the patient and chart.         Past Medical History:    has a past medical history of Arthritis; Carlson's  "esophagus; GERD (gastroesophageal reflux disease); Hard of hearing; Psoriasis; and Skin cancer.          Past Surgical History:     Past Surgical History:   Procedure Laterality Date     HERNIORRHAPHY INGUINAL Right 12/1/2015    Procedure: HERNIORRHAPHY INGUINAL;  Surgeon: Malcom Rasmussen MD;  Location: [facility]     HERNIORRHAPHY INGUINAL Left 6/9/2017    Procedure: HERNIORRHAPHY INGUINAL;  OPEN LEFT INGUINAL HERNIA REPAIR WITH MESH ;  Surgeon: Aden Haro MD;  Location:  OR            Medications:     Prior to Admission medications    Medication Sig Start Date End Date Taking? Authorizing Provider   IBUPROFEN PO Take 600 mg by mouth every 8 hours as needed    Yes Reported, Patient   mometasone (ELOCON) 0.1 % cream Apply once a day to affected area, ok to use on face 1/11/18  Yes Reported, Patient            Allergies:     Allergies   Allergen Reactions     Seasonal Allergies      Protonix [Pantoprazole] Rash            Social History:     Social History   Substance Use Topics     Smoking status: Former Smoker     Quit date: 2/3/1964     Smokeless tobacco: Never Used     Alcohol use Yes      Comment: 1 drink daily             Family History:    This patient has no significant relevant family history.  Family history is reviewed in detail.          Review of Systems:   Brief ROS is negative other than noted in the HPI.  C: NEGATIVE for fever, chills, change in weight  R: NEGATIVE for significant cough or SOB  CV: NEGATIVE for chest pain, palpitations or peripheral edema  GI:  NEGATIVE for dysuria, heartburn, or change in bowel habits  H: NEGATIVE for bleeding problems         Physical Exam:   Blood pressure 130/81, pulse 76, temperature 99.6  F (37.6  C), temperature source Oral, resp. rate 16, height 1.753 m (5' 9\"), weight 74.8 kg (165 lb), SpO2 98 %.       General - This is a well developed, well nourished male .  HEENT - Normocephalic. Atraumatic. Moist mucous membranes. Pupils equal.  No scleral " icterus. Nose normal.  Neck - Supple without masses. No cervical adenopathy or thyromegaly. No stridor  Lungs - Breathing not labored  Chest - Not tender. CVA's nontender  Heart - Regular rate & rhythm   Abdomen - Soft, nontender, nondistended with +bowel sounds, no organomegaly. No inguinal hernias present when supine.  No localized guarding or rebound.  Extremities - Moves all extremities. Warm without edema. Palpable pulses  Neurologic - Nonfocal.          Data:   Labs:  Recent Labs   Lab Test  02/27/18   1219   WBC  12.6*   HGB  15.4   HCT  45.1   PLT  154     Recent Labs   Lab Test  02/27/18   1219 01/09/17 11/30/15   POTASSIUM  3.6  4.4  3.8   CHLORIDE  100   --    --    CO2  31   --    --    BUN  25   --    --    CR  1.21  1.15  1.07     Recent Labs   Lab Test  02/27/18   1219 01/09/17 11/30/15   BILITOTAL  1.3   --    --    ALT  16  9  10   AST  13  18  15   ALKPHOS  97   --    --    LIPASE  92   --    --      Recent Labs   Lab Test  02/27/18   1219 11/30/15   INR  1.02  1.0     Recent Labs   Lab Test  02/27/18   1219   TOBI  8.9     Recent Labs   Lab Test  02/27/18   1219   ANIONGAP  8   ALBUMIN  3.7       CT scan of the abdomen: images reviewed in detail. He has a distended stomach and duodenum.   Several loops of proximal small bowel were distended with gas and fluid.  Some jejunum has thickening of the wall.  Distal to this there is non-distended small bowel.  There is gas and stool in the colon.  I see no free air.  I see no transition point that is sharply marked from distended to nondistended bowel.    Ultrasound of the abdomen: not done        EKG: Complete; See Chart         Assessment:   Abdominal pain, nausea and vomiting, now resolved.  He is hungry and has no nausea.  He has no abdominal pain.  He says he has some back pain from a golf injury many years ago.  Review of the CT scan does not really show a bowel obstruction.  It shows dilated  Proximal bowel and decompressed distal bowel with a  probable area of bowel thickening in the middle.  This is more likely due to an ileus perhaps from inflammation, ischemia, or viral cause.         Plan:    no surgical intervention at this time.  Since he is feeling better we might consider getting sips of liquids for at least ice and  water.  I would encourage walking.  If his bowels started moving or he starts passing gas we may advance his diet.  We will follow him closely with you.       I have discussed the history, physical, and plan with the patient.   Adebayo Gonzalez M.D.[PB1.1]          Revision History        User Key Date/Time User Provider Type Action    > PB1.1 2/27/2018  5:05 PM Adebayo Gonzalez MD Physician Sign                     Progress Notes - Physician (Notes for yesterday and today)      Progress Notes by Raad Aguilar MD at 3/27/2018 10:34 AM     Author:  Raad Aguilar MD Service:  Hospitalist Author Type:  Physician    Filed:  3/27/2018 10:48 AM Date of Service:  3/27/2018 10:34 AM Creation Time:  3/27/2018 10:34 AM    Status:  Signed :  Raad Aguilar MD (Physician)         [facility]    Hospitalist Progress Note    Date of Service (when I saw the patient): 03/27/2018    Assessment & Plan   Joseluis Falcon is an 85 year-old male with history of bilateral inguinal herniorrhaphy and psoriasis who presented to [facility] ED on 02/27/2018 with a 2-3 day history of nausea, vomiting and abdominal pain.  Workup revealed SBO, likely due to adhesions.  Failed nonoperative management for 6 days after which he was taken to the OR on 03/04/2018 and underwent exp lap, TISHA and partial small-bowel resection.  He has had a complicated postop course including extended intubation, pneumonia and prolonged ICU delirium. Hospitalist team resumed primary medical cares 3/20/18.       Small bowel obstruction secondary to adhesions, s/p exploratory laparoscopy with lysis of adhesions and small bowel resection, repair of small  umbilical hernia 3/4/18  Presented with symptoms of nausea, vomiting and abdominal pain, found secondary to SBO which was in turn due to adhesions.  He failed nonoperative management and underwent above surgery.   - Post-op complicated by an ileus which has now resolved.    - Appreciate general surgery inputs; following peripherally  - Will plan for surgical follow-up at discharge now that plans for hospice/comfort measures have been reversed    Acute metabolic encephalopathy  Prolonged delirium secondary to pneumonia and ICU delirium.  - Remains oriented to self only, although much more alert, cooperative and following commands  - Discontinued IV lorazepam; ordered quetiapine PO PRN for agitation      Severe malnutrition  Patient did not receive nutrition for greater than 5 days, thus was initiated on TPN, which has since been weaned off.   - Family understanding of aspiration risks, and wants patient to attempt to eat orally  - Continue modified diet (DD1 with honey thickened liquids)     Acute hypercapnic respiratory failure  Required intubation for airway protection for exploratory laparotomy, TISHA and partial small-bowel obstruction.  Able to extubate on 03/11 but reintubated on 03/12, and was ultimately able to extubate on 03/18.  CT chest with IV contrast on 03/06 was negative for PE, but revealed mild emphysema and fibrosis, (probably age-related).   - Currently off oxygen      Postoperative atrial fibrillation with RVR  Required amiodarone drip and rate controlling agent. Since converted to sinus rhythm. Electrophysiology followed patient initially. Initially plan was for comfort measures, however due to improvement in clinical condition, discussed with the daughter and she would prefer the patient to be on anticoagulation to prevent stroke risk  - Continue apixaban 2.5 mg PO BID      Aspiration pneumonia  Completed 7 day course of piperacillin-tazobactam IV.     Acute kidney injury  Peak creatinine 1.37.  Likely prerenal, responded well to IV fluid hydration.   - Creatinine stable     Advanced care planning   Patient had prolonged encephalopathy and delirium and initially due to lack of progress the decision was made to pursue comfort measures on 3/23. Palliative care was also consulted and following.   - Planning for discharge to TCU and attempt at rehabilitation.     Cognitive impairment  No formal diagnoses made, however daughter notes he had been having more difficulty with short term memory for the months preceding hospitalization, and she had been planning to bring him in for evaluation prior to his acute illness.   - Plan for TCU on discharge  - Consider formal cognitive testing as outpatient to establish diagnosis    Pain Assessment:  Current Pain Score 3/27/2018   Patient currently in pain? denies   Pain score (0-10) -   Pain location -   Pain descriptors -   CPOT pain score -   Joseluis gan pain level was assessed and he currently denies pain.      DVT Prophylaxis: Enoxaparin (Lovenox) SQ  Code Status: DNR/DNI    Disposition: Expected discharge to TCU once placement found.     Raad CONNER Aguilar    Interval History   Overnight had one episode of agitation and received IV lorazepam due to hitting staff. Cooperative this morning, denies any complaints. Discussed with daughter at bedside.     -Data reviewed today: I reviewed all new labs and imaging results over the last 24 hours. I personally reviewed no images or EKG's today.    Physical Exam   Temp: 97.6  F (36.4  C) Temp src: Oral BP: 94/55 Pulse: 55 Heart Rate: 54 Resp: 16 SpO2: 98 % O2 Device: None (Room air)    Vitals:    03/18/18 0400 03/19/18 0400 03/20/18 0400   Weight: 63.8 kg (140 lb 10.5 oz) 63.4 kg (139 lb 12.4 oz) 63.7 kg (140 lb 6.9 oz)     Vital Signs with Ranges  Temp:  [97.6  F (36.4  C)-98.5  F (36.9  C)] 97.6  F (36.4  C)  Pulse:  [55-64] 55  Heart Rate:  [54-59] 54  Resp:  [16-18] 16  BP: ()/(44-60) 94/55  SpO2:  [94 %-98 %] 98 %  I/O last  "3 completed shifts:  In: 120 [P.O.:120]  Out: -     Constitutional: Elderly male, NAD  Respiratory: Clear to auscultation bilaterally, good air movement bilaterally  Cardiovascular: RRR, no m/r/g. No peripheral edema.  GI: Soft, non-tender, non-distended. BS normoactive.   Skin/Integumen: Warm, dry  Neuro: Alert. Cooperative. Following commands. Oriented to self, oriented to \"hospital\" with some cues, not oriented to date or reason for hospitalization.     Medications       apixaban ANTICOAGULANT  5 mg Oral BID     famotidine  10 mg Oral BID       Data     Recent Labs  Lab 03/23/18  0515 03/23/18  0415 03/23/18  0210 03/22/18  1000 03/21/18  0600   WBC 12.4* 12.1*  --   --  7.3   HGB 9.7* 10.2*  --   --  9.8*   MCV 97 97  --   --  97    189  --   --  230   NA  --   --  135 136 138   POTASSIUM  --   --  4.3 4.4 4.1   CHLORIDE  --   --  102 104 105   CO2  --   --  27 27 29   BUN  --   --  36* 30 29   CR  --   --  0.81 0.71 0.67   ANIONGAP  --   --  6 5 4   TOBI  --   --  7.6* 7.7* 7.6*   GLC  --   --  115* 128* 107*       No results found for this or any previous visit (from the past 24 hour(s)).[IM1.1]       Revision History        User Key Date/Time User Provider Type Action    > IM1.1 3/27/2018 10:48 AM Raad Aguilar MD Physician Sign            Progress Notes by Gibson Aguillon MD at 3/26/2018  3:41 PM     Author:  Gibson Aguillon MD Service:  Hospitalist Author Type:  Physician    Filed:  3/26/2018  3:55 PM Date of Service:  3/26/2018  3:41 PM Creation Time:  3/26/2018  3:41 PM    Status:  Signed :  Gibson Aguillon MD (Physician)         [facility]    Hospitalist Progress Note    Assessment & Plan   Joseluis Falcon is a 85 year-old male with history of bilateral inguinal herniorrhaphy and psoriasis who presented to [facility] ED on 02/27/2018 with a 2-3 day history of nausea, vomiting and abdominal pain.  Workup revealed SBO, likely due to adhesions.  Failed nonoperative " management for 6 days after which he was taken to the OR on 03/04/2018 and underwent exp lap, TISHA and partial small-bowel resection.  He has had a complicated postop course including extended intubation, pneumonia and prolonged ICU delirium. Hospitalist team resumed primary medical cares 3/20/18.       Small bowel obstruction secondary to adhesions, s/p exploratory laparoscopy with lysis of adhesions and small bowel resection, repair of small umbilical hernia 3/4/18  Presented with symptoms of nausea, vomiting and abdominal pain, found secondary to SBO which was in turn due to adhesions.  He failed nonoperative management and underwent above surgery.   -Post-op complicated by an ileus which has now resolved.    -Appreciate general surgery inputs; following peripherally  -Will arrange surgical follow-up at discharge now that plans for hospice/comfort measures have been reversed    Acute metabolic encephalopathy  Prolonged delirium secondary to pneumonia and ICU delirium.  -Remained  confused; however ongoing improvement since 3/24  -Patient not agitated and combative anymore and following directions for the most part     Severe malnutrition  -Initially was on TPN due to prolonged delirium and ICU stay  -Daughter aware that patient still at considerable aspiration risk but would like to take the chance and allow him to eat.    Acute hypercapnic respiratory failure  Required intubation for airway protection for exploratory laparotomy, TISHA and partial small-bowel obstruction.  Able to extubate on 03/11 but reintubated on 03/12, and was ultimately able to extubate on 03/18.  CT chest with IV contrast on 03/06 was negative for PE, but revealed mild emphysema and fibrosis, (probably age-related).   -Currently off oxygen      Postoperative atrial fibrillation with RVR  Required amiodarone drip and rate controlling agent.   -Now converted to normal sinus rhythm  -Electrophysiology followed patient initially   -Initially plan  was for comfort measures however due to improvement in clinical condition, discussed with the daughter at the bedside, she would prefer the patient to be on anticoagulation to prevent stroke risk  -Started Ruben;d/w Dr Malloy, who felt that would be appropriate.Continue at DC    Aspiration pneumonia  Completed 7 day course of piperacillin-tazobactam IV and antibiotics was discontinued  -Patient had another episode of respiratory distress and fever 3/23  -Off antibiotics at this time, afebrile and doing well    Acute kidney injury  Peak creatinine 1.37. Likely prerenal, responded well to IV fluid hydration.   -Creatinine stable    Goals of care:  -Patient  had prolonged encephalopathy and delirium and initially due to lack of progress the decision was made to pursue comfort measures on 3/23.  He was also evaluated by palliative care.  -However in the last 72 hours patient has been more lucid and interactive and improved  -daughter Prema now wants pt going to TCU.Comfort measures decision reversed.  -Plan is now to discharged toTCU, possibly hospice eval down the road      # Pain Assessment:   Current Pain Score 3/26/2018 3/25/2018 3/25/2018   Patient currently in pain? denies denies denies   Pain score (0-10) - - -   Pain location - - -   Pain descriptors - - -   CPOT pain score - - -   - Joseluis is unable to participate in a collaborative plan for pain management due to acute encephalopathy.   - Please see the plan for pain management as documented above      D/W: RN  DVT Prophylaxis: Enoxaparin (Lovenox) SQ  Code Status: DNR/DNI    Disposition: Expected discharge 3/27    Gibson Aguillon MD    Interval History    Patient continues to be lucid, intermittently confused and disoriented. Now on puréed diet, reports suppressed appetite.  No new complaints    -Data reviewed today: I reviewed all new labs and imaging results over the last 24 hours. I personally reviewed the EKG tracing showing nsr.      Physical Exam    Temp: 98.5  F (36.9  C) Temp src: Oral BP: 97/57 Pulse: 58 Heart Rate: 57 Resp: 16 SpO2: 96 % O2 Device: None (Room air)    Vitals:    03/18/18 0400 03/19/18 0400 03/20/18 0400   Weight: 63.8 kg (140 lb 10.5 oz) 63.4 kg (139 lb 12.4 oz) 63.7 kg (140 lb 6.9 oz)     Vital Signs with Ranges  Temp:  [98  F (36.7  C)-98.8  F (37.1  C)] 98.5  F (36.9  C)  Pulse:  [58-59] 58  Heart Rate:  [57-66] 57  Resp:  [16] 16  BP: ()/(44-63) 97/57  SpO2:  [93 %-97 %] 96 %  I/O last 3 completed shifts:  In: 60 [P.O.:60]  Out: -     Constitutional: Awake, calm, interactive, answering simple questions appropriately  Respiratory: Coarse breath sounds bilaterally, normal effort of breathing  Cardiovascular: RRR, No murmur  GI: Soft, Non- tender  Skin/Integument: Multiple bruising in different body parts  MSK: No joint deformity or swelling, no edema  Neuro: Appears to move all 4 extremities      Medications           tamsulosin  0.4 mg Oral Daily     apixaban ANTICOAGULANT  2.5 mg Oral BID     famotidine  10 mg Oral BID       Data       Recent Labs  Lab 03/23/18  0515 03/23/18  0415 03/23/18  0210 03/22/18  1000 03/21/18  0600   WBC 12.4* 12.1*  --   --  7.3   HGB 9.7* 10.2*  --   --  9.8*   MCV 97 97  --   --  97    189  --   --  230   NA  --   --  135 136 138   POTASSIUM  --   --  4.3 4.4 4.1   CHLORIDE  --   --  102 104 105   CO2  --   --  27 27 29   BUN  --   --  36* 30 29   CR  --   --  0.81 0.71 0.67   ANIONGAP  --   --  6 5 4   TOBI  --   --  7.6* 7.7* 7.6*   GLC  --   --  115* 128* 107*       No results found for this or any previous visit (from the past 24 hour(s)).[NB1.1]     Revision History        User Key Date/Time User Provider Type Action    > NB1.1 3/26/2018  3:55 PM Gibson Aguillon MD Physician Sign                  Procedure Notes     No notes of this type exist for this encounter.      Progress Notes - Therapies (Notes from 03/24/18 through 03/27/18)     No notes of this type exist for this encounter.                                           INTERAGENCY TRANSFER FORM - LAB / IMAGING / EKG / EMG RESULTS   2/27/2018                       [place] [address] SURGICAL SPECIALITIES: [phone]            Unresulted Labs     None         Lab Results - 3 Days      Blood culture [[id number]]  Resulted: 03/27/18 0256, Result status: Preliminary result    Ordering provider: Antione Bautista MD  03/23/18 0356 Resulting lab: [facility]    Specimen Information    Type Source Collected On   Blood Red Port 03/23/18 0415   Comment:  Left Arm          Components       Value Reference Range Flag Lab   Specimen Description Blood Left Arm      Special Requests Aerobic and anaerobic bottles received   FrStHsLb   Culture Micro No growth after 4 days   75            Blood culture [[id number]]  Resulted: 03/27/18 0256, Result status: Preliminary result    Ordering provider: Antione Bautista MD  03/23/18 0356 Resulting lab: [facility]    Specimen Information    Type Source Collected On   Blood Red Port 03/23/18 0515   Comment:  PICC          Components       Value Reference Range Flag Lab   Specimen Description Blood PICC      Special Requests Aerobic and anaerobic bottles received   FrStHsLb   Culture Micro No growth after 4 days   75            Glucose by meter [[id number]]  Resulted: 03/24/18 0754, Result status: Final result    Ordering provider: Mazin Melchor MD  03/24/18 0736 Resulting lab: POINT OF CARE TEST, GLUCOSE    Specimen Information    Type Source Collected On     03/24/18 0736          Components       Value Reference Range Flag Lab   Glucose 89 70 - 99 mg/dL  170            Testing Performed By     Lab - Abbreviation Name Director Address Valid Date Range    14 - FrStHsLb [facility] Unknown [address] [place] [address] 05/08/15 1057 - Present    75 - Unknown [facility] Unknown [address]  [facility] [address] 01/15/15 1019 - Present    170 - Unknown POINT OF CARE TEST, GLUCOSE Unknown Unknown 10/31/11 1114 - Present               ECG/EMG Results      Echocardiogram Complete [[id number]]  Resulted: 18 1454, Result status: Edited Result - FINAL    Ordering provider: Juventino Wise MD  18 1002 Resulted by: Sandip Hardy MD    Performed: 18 1523 - 18 1523 Resulting lab: RADIOLOGY RESULTS    Narrative:       [id number]  [facility]  [id number]  [id number]^BILLIE^JUVENTINO^ANNA           [facility]  Echocardiography Laboratory  [address] [place] [address]        Name: ISRAEL PERDOMO  MRN: [id number]  : 1932  Study Date: 2018 02:54 PM  Age: 85 yrs  Gender: Male  Patient Location: [facility]  Reason For Study: Tachycardia  Ordering Physician: JUVENTINO WISE  Referring Physician: Danny Diop  Performed By: Anna Carroll,     BSA: 1.9 m2  Height: 69 in  Weight: 164 lb  HR: 87  BP: 80/61 mmHg  _____________________________________________________________________________  __        Procedure  Complete Portable Echo Adult.  _____________________________________________________________________________  __        Interpretation Summary     The left ventricle is normal in size.  The visual ejection fraction is estimated at 65-70%.  Grade I or early diastolic dysfunction.  No regional wall motion abnormalities noted.  There is mild mitral annular calcification.  Mild mitral valve prolapse, posterior leaflet  There is mild to moderate (1-2+) mitral regurgitation.  The mitral regurgitant jet is eccentrically directed.  Normal left atrial size.  Right ventricle systolic pressure estimate normal  _____________________________________________________________________________  __        Left Ventricle  The left ventricle is normal in size. There is normal left ventricular wall  thickness. The visual ejection fraction is estimated at 65-70%. Grade I  or  early diastolic dysfunction. No regional wall motion abnormalities noted.     Right Ventricle  The right ventricle is normal in size and function.     Atria  Normal left atrial size. Right atrial size is normal. There is no color  Doppler evidence of an atrial shunt.     Mitral Valve  The mitral valve leaflets are mildly thickened. There is mild mitral annular  calcification. Mild mitral valve prolapse, posterior leaflet. There is mild to  moderate (1-2+) mitral regurgitation. The mitral regurgitant jet is  eccentrically directed.        Tricuspid Valve  There is trace tricuspid regurgitation. Normal IVC (1.5-2.5cm) with >50%  respiratory collapse; right atrial pressure is estimated at 5-10mmHg. The  right ventricular systolic pressure is approximated at 25.8 mmHg plus the  right atrial pressure. Right ventricle systolic pressure estimate normal.     Aortic Valve  There is mild trileaflet aortic sclerosis. No aortic regurgitation is present.     Pulmonic Valve  There is trace pulmonic valvular regurgitation.     Vessels  Normal size aorta. Borderline aortic root dilatation.     Pericardium  There is no pericardial effusion.        Rhythm  Sinus rhythm was noted.  _____________________________________________________________________________  __  MMode/2D Measurements & Calculations  IVSd: 1.2 cm     LVIDd: 3.9 cm  LVIDs: 2.3 cm  LVPWd: 1.0 cm  FS: 41.8 %  LV mass(C)d: 135.9 grams  LV mass(C)dI: 71.6 grams/m2  Ao root diam: 3.6 cm  LA dimension: 4.0 cm  asc Aorta Diam: 3.2 cm  LA/Ao: 1.1  LA Volume (BP): 52.2 ml  LA Volume Index (BP): 27.5 ml/m2  RWT: 0.52           Doppler Measurements & Calculations  MV E max esvin: 59.1 cm/sec  MV A max esvin: 91.7 cm/sec  MV E/A: 0.64  MV dec time: 0.30 sec  TR max esvin: 252.9 cm/sec  TR max P.8 mmHg  E/E' avg: 10.0  Lateral E/e': 8.4  Medial E/e': 11.7           _____________________________________________________________________________  __           Report approved by:  Osvaldo Hankins 03/06/2018 04:31 PM       1    Type Source Collected On     03/06/18 1454          View Image (below)              Encounter-Level Documents:     There are no encounter-level documents.      Order-Level Documents:     There are no order-level documents.

## 2018-02-27 NOTE — IP AVS SNAPSHOT
[facility] [address] [facility]    [address] [place] [address]    Phone:  [phone]                                       After Visit Summary   2/27/2018    Joseluis Falcon    MRN: [id number]           After Visit Summary Signature Page     I have received my discharge instructions, and my questions have been answered. I have discussed any challenges I see with this plan with the nurse or doctor.    ..........................................................................................................................................  Patient/Patient Representative Signature      ..........................................................................................................................................  Patient Representative Print Name and Relationship to Patient    ..................................................               ................................................  Date                                            Time    ..........................................................................................................................................  Reviewed by Signature/Title    ...................................................              ..............................................  Date                                                            Time

## 2018-02-27 NOTE — ED NOTES
"[facility]  ED Nurse Handoff Report    ED Chief complaint: Abdominal Pain (pain behind umbilicus, ongoing since yesterday.  Reports vomited brown emesis.  Hx hernia repair.  Last BM 2-3 days ago.)      ED Diagnosis:   Final diagnoses:   Small bowel obstruction   Abdominal pain, generalized       Code Status: Full Code    Allergies:   Allergies   Allergen Reactions     Seasonal Allergies      Protonix [Pantoprazole] Rash       Activity level - Baseline/Home:  Independent    Activity Level - Current:   Stand with Assist     Needed?: No    Isolation: No  Infection: Not Applicable    Bariatric?: No    Vital Signs:   Vitals:    02/27/18 1212 02/27/18 1220 02/27/18 1230   BP: 98/79 (!) 132/94 111/82   Resp: 18     Temp: 98.6  F (37  C)     TempSrc: Oral     SpO2: 95% 98% 98%   Weight: 74.8 kg (165 lb)     Height: 1.753 m (5' 9\")         Cardiac Rhythm: ,        Pain level: 0-10 Pain Scale: 10    Is this patient confused?: No    Patient Report: Initial Complaint: ab pain  Focused Assessment: patient presents with mid ab pain, radiates to back ongoing since yesterday.  Reported last BM 2-3 days ago and vomited with brown emesis.  CT showed SBO.  Pain controlled with fentanyl.  Nausea relieved with Zofran.  A/o x3 but hard of hearing.  Normally lives independently at home with no assistive devices.   Daughter @ bedside  Tests Performed: blood work/CT/EKG  Abnormal Results: SBO  Treatments provided: fentanyl, zofran, IVF, pepcid    Family Comments: daughter @ bedside    OBS brochure/video discussed/provided to patient: N/A    ED Medications:   Medications   fentaNYL (PF) (SUBLIMAZE) injection 50 mcg (50 mcg Intravenous Given 2/27/18 1310)   famotidine (PEPCID) injection 20 mg (20 mg Intravenous Given 2/27/18 1239)   0.9% sodium chloride BOLUS (1,000 mLs Intravenous New Bag 2/27/18 1235)   ondansetron (ZOFRAN) injection 4 mg (4 mg Intravenous Given 2/27/18 1243)   iopamidol (ISOVUE-370) solution 83 " mL (83 mLs Intravenous Given 2/27/18 1325)   SalineFlush (65 mLs Intravenous Given 2/27/18 1327)   ondansetron (ZOFRAN) injection 4 mg (4 mg Intravenous Given 2/27/18 1314)       Drips infusing?:  Yes      ED NURSE PHONE NUMBER: *[phone]

## 2018-02-27 NOTE — H&P
Admitted:     02/27/2018      PRIMARY CARE PHYSICIAN:  Dr. Danny Diop.      CHIEF COMPLAINT:  Pain in abdomen, vomiting.      HISTORY OF PRESENT ILLNESS:  Mr. Joseluis Falcon is a very pleasant 85-year-old male with past medical history significant for psoriasis and bilateral inguinal herniorrhaphy, who presented to the ER today with complaints of pain in abdomen and vomiting.  He states for past 2-3 days he has been having generalized abdominal pain mostly in the periumbilical area and has had several episodes of vomiting.  His last bowel movement was 3 days ago.  He denies any fever, chills or rigors.  No chest pain or shortness of breath.  Reports a normal bladder habit.  Here in the ER, he was seen by Dr. Jesus.  CT abdomen showed suggestion of proximal small bowel obstruction.  Hospitalist was requested admission for further evaluation.      REVIEW OF SYSTEMS:  A 10-point review of systems was done and was negative apart from those mentioned in the history of present illness.      PAST MEDICAL HISTORY:   1.  Bilateral inguinal herniorrhaphy in 2015 and 2017.   2.  Psoriasis.      MEDICATIONS PRIOR TO ADMISSION:   Prescriptions Prior to Admission   Medication Sig Dispense Refill Last Dose     IBUPROFEN PO Take 600 mg by mouth every 8 hours as needed    2/26/2018 at Unknown time     mometasone (ELOCON) 0.1 % cream Apply once a day to affected area, ok to use on face   2/27/2018 at Unknown time         ALLERGIES:  PROTONIX.      SOCIAL HISTORY:  He presents today with his daughter.  Quit smoking in 1964.  Takes occasional alcohol, no illicit drug use.      FAMILY HISTORY:  Reviewed and not pertinent to current presentation.      PHYSICAL EXAMINATION:   GENERAL:  The patient is conscious, alert, oriented x 3, lying comfortably in bed in no apparent distress.   VITAL SIGNS:  Temperature 98.6, heart rate of 67, blood pressure 111/82, saturation 98% on room air.   HEENT:  Pupils are equal and reactive to light and  accommodation.  Extraocular movements are intact.  Oral mucosa is dry.   NECK:  Supple, no rigidity.   RESPIRATORY:  Lung sounds bilaterally clear to auscultation, no wheezes or crepitation.   CARDIOVASCULAR:  Normal S1-S2, regular rate and rhythm, no murmur.   ABDOMEN:  Soft, mild periumbilical tenderness; no guarding, rigidity or rebound tenderness.  Bowel sounds are present and exaggerated.   LOWER EXTREMITIES:  With no edema.   NEUROLOGIC:  No focal neurological deficits noted.  Cranial nerves II-XII grossly intact.   PSYCHIATRIC:  Normal mood and affect.      LABORATORY DATA:  CMP, CBC reviewed in Epic.  Notable for creatinine of 1.21.  LFTs are normal.  Lipase normal.  Troponin I negative x 1.  Glucose 168.  CBC with WBC of 12.6.     EKG: isolated ST elevation in lead III but all his prior EKGs have similar morphology and no significant change     DIAGNOSTIC DATA:  CT abdomen and pelvis reviewed shows proximal small bowel obstruction with nonspecific wall thickening of the mid small bowel loops.      ASSESSMENT AND PLAN:  Mr. Joseluis Falcon is an 85-year-old male with a past medical history significant for psoriasis and bilateral inguinal herniorrhaphy, who presented to the ER today with complaints of pain in abdomen and vomiting and noted with a small bowel obstruction.     1.  Acute pain in abdomen and vomiting secondary to proximal small bowel obstruction in the setting of bilateral inguinal herniorrhaphy.  Will admit him as inpatient due to episodes of vomiting, although he is not currently vomiting, but unable to tolerate p.o.  Will monitor ins and outs.  We will keep him n.p.o. with plans to start clear liquid diet tomorrow if he remains stable.  If he starts vomiting again, we will do an NG tube decompression.  We will consult General Surgery and manage conservatively for now.  Will have p.r.n. nausea and pain medications available.     2.  Likely acute on chronic renal failure.  His baseline creatinine is  around 1-1.1.  Current creatinine of 1.2, could be secondary to emesis and mild dehydration.  Will start IV hydration and repeat BMP in a.m.     3.  Mild leukocytosis.  No clear etiology, although he does have some nonspecific wall thickening of the small bowels.  This could be from the bowel obstruction itself as a stress response.  He is afebrile, does not look sick.  We will repeat CBC in the a.m.     4.  Deep venous thrombosis prophylaxis:  Mechanical with PCD boots.      CODE STATUS:  This was discussed and he wishes to be full code.         LILIYA PUGH MD             D: 2018   T: 2018   MT: DONNA      Name:     ISRAEL PERDOMO   MRN:      [id number]        Account:      [id number]   :      1932        Admitted:     2018                   Document: [id number]

## 2018-02-28 LAB
ANION GAP SERPL CALCULATED.3IONS-SCNC: 5 MMOL/L (ref 3–14)
BUN SERPL-MCNC: 27 MG/DL (ref 7–30)
CALCIUM SERPL-MCNC: 8.5 MG/DL (ref 8.5–10.1)
CHLORIDE SERPL-SCNC: 106 MMOL/L (ref 94–109)
CO2 SERPL-SCNC: 28 MMOL/L (ref 20–32)
CREAT SERPL-MCNC: 1.09 MG/DL (ref 0.66–1.25)
ERYTHROCYTE [DISTWIDTH] IN BLOOD BY AUTOMATED COUNT: 12.8 % (ref 10–15)
GFR SERPL CREATININE-BSD FRML MDRD: 64 ML/MIN/1.7M2
GLUCOSE SERPL-MCNC: 130 MG/DL (ref 70–99)
HCT VFR BLD AUTO: 42.8 % (ref 40–53)
HGB BLD-MCNC: 14.1 G/DL (ref 13.3–17.7)
MCH RBC QN AUTO: 32.5 PG (ref 26.5–33)
MCHC RBC AUTO-ENTMCNC: 32.9 G/DL (ref 31.5–36.5)
MCV RBC AUTO: 99 FL (ref 78–100)
PLATELET # BLD AUTO: 133 10E9/L (ref 150–450)
POTASSIUM SERPL-SCNC: 4.3 MMOL/L (ref 3.4–5.3)
RBC # BLD AUTO: 4.34 10E12/L (ref 4.4–5.9)
SODIUM SERPL-SCNC: 139 MMOL/L (ref 133–144)
WBC # BLD AUTO: 5 10E9/L (ref 4–11)

## 2018-02-28 PROCEDURE — 12000000 ZZH R&B MED SURG/OB

## 2018-02-28 PROCEDURE — 80048 BASIC METABOLIC PNL TOTAL CA: CPT | Performed by: HOSPITALIST

## 2018-02-28 PROCEDURE — 36415 COLL VENOUS BLD VENIPUNCTURE: CPT | Performed by: HOSPITALIST

## 2018-02-28 PROCEDURE — 25000132 ZZH RX MED GY IP 250 OP 250 PS 637: Performed by: HOSPITALIST

## 2018-02-28 PROCEDURE — 25000128 H RX IP 250 OP 636: Performed by: HOSPITALIST

## 2018-02-28 PROCEDURE — 85027 COMPLETE CBC AUTOMATED: CPT | Performed by: HOSPITALIST

## 2018-02-28 PROCEDURE — 25000132 ZZH RX MED GY IP 250 OP 250 PS 637: Performed by: INTERNAL MEDICINE

## 2018-02-28 PROCEDURE — 99232 SBSQ HOSP IP/OBS MODERATE 35: CPT | Performed by: INTERNAL MEDICINE

## 2018-02-28 PROCEDURE — 99231 SBSQ HOSP IP/OBS SF/LOW 25: CPT | Performed by: SURGERY

## 2018-02-28 PROCEDURE — 25800025 ZZH RX 258: Performed by: HOSPITALIST

## 2018-02-28 RX ADMIN — PROCHLORPERAZINE EDISYLATE 5 MG: 5 INJECTION INTRAMUSCULAR; INTRAVENOUS at 03:57

## 2018-02-28 RX ADMIN — POTASSIUM CHLORIDE, DEXTROSE MONOHYDRATE AND SODIUM CHLORIDE: 150; 5; 900 INJECTION, SOLUTION INTRAVENOUS at 19:49

## 2018-02-28 RX ADMIN — POTASSIUM CHLORIDE, DEXTROSE MONOHYDRATE AND SODIUM CHLORIDE: 150; 5; 900 INJECTION, SOLUTION INTRAVENOUS at 06:44

## 2018-02-28 RX ADMIN — ACETAMINOPHEN 650 MG: 325 TABLET, FILM COATED ORAL at 03:04

## 2018-02-28 RX ADMIN — ONDANSETRON 4 MG: 2 INJECTION INTRAMUSCULAR; INTRAVENOUS at 03:25

## 2018-02-28 NOTE — PROGRESS NOTES
Xcoverage: paged by RN because pt continues to be confused and agitated; Haldol iv given earlier; will order Zyprexa 5 mg ODTX1 now.    ADDENDUM: pt refused Zyprexa ODT, will give Zyprexa 5 mg imX1.    [place] MD

## 2018-02-28 NOTE — PROGRESS NOTES
"Surgery    Patient dressed in nice casual clothes - not sure if he was offered a hospital gown but thinks if a person is admitted to the hospital, they should be in a gown.   He denies nausea.   He does recall episode of emesis yesterday - occurred this morning per chart.  Denies bowel function, but admits he gets emesis and bm's confused because they're both liquid.  Denies abd pain.  Not feeling hungry at this time.    Gen:  Awake, Alert, pleasant, confused, NAD  Blood pressure 116/66, pulse 64, temperature 98  F (36.7  C), temperature source Oral, resp. rate 16, height 1.753 m (5' 9\"), weight 74.8 kg (164 lb 14.5 oz), SpO2 95 %.  Resp - clear to ascultation.    Cardiac - Regular rate & rhythm without murmur  Abdomen - few bowel sounds, soft, non tender, non distended.   Extremities - no lower extremity edema.    Wbc 5.0 (12.6)  Cre 1.09 (1.21)    Emesis 800cc this morning.    A/P 85 year old admitted for abd pain, nausea and vomiting with CT finding of dilated proximal small bowel.    - sips and ice chips.  - discussed placing NG tube if he feels nauseated.  - walk with assistance a few times daily  - following.    Bryan Griffin PA-C  Office: [phone]  Pager: [phone]    "

## 2018-02-28 NOTE — PROGRESS NOTES
"[facility]  Hospitalist Progress Note  Adebayo Sosa MD  02/28/2018    Assessment & Plan   ASSESSMENT AND PLAN:  Mr. Joseluis Falcon is an 85-year-old male with a past medical history significant for psoriasis and bilateral inguinal herniorrhaphy, who presented to the ER today with complaints of pain in abdomen and vomiting and noted with a small bowel obstruction.   1.  Acute pain in abdomen and vomiting secondary to proximal small bowel obstruction   - hx of bilateral inguinal herniorrhaphy.    - had one large emesis overnight and once this am.  - now abd is soft.  - patient with intermittent confusion, he states he didn't sleep at all last night.  - keep him n.p.o. with plans to start clear liquid diet tomorrow if he remains stable.   - if he is vomiting again, we will do an NG tube decompression.    - will consult General Surgery and manage if not improved for more than 2 days or more severe symptoms.   2.  Likely acute on chronic renal failure.    - baseline creatinine is around 1-1.1.    - Current creatinine of  1.09 from 1.2, could be secondary to emesis and mild dehydration.    - continue IVF  3.  Mild leukocytosis.    - no fever  - improved.  4.  Deep venous thrombosis prophylaxis:  Mechanical with PCD boots.       CODE STATUS:  This was discussed and he wishes to be full code.     Interval History   - chart reviewed  - had emesis 800 cc overnight and one this AM    -Data reviewed today: I reviewed all new labs and imaging over the last 24 hours. I personally reviewed no images or EKG's today.    Physical Exam   Heart Rate: 71, Blood pressure 116/66, pulse 64, temperature 98  F (36.7  C), temperature source Oral, resp. rate 16, height 1.753 m (5' 9\"), weight 74.8 kg (164 lb 14.5 oz), SpO2 95 %.  Vitals:    02/27/18 1212 02/28/18 0700   Weight: 74.8 kg (165 lb) 74.8 kg (164 lb 14.5 oz)     Vital Signs with Ranges  Temp:  [98  F (36.7  C)-99.6  F (37.6  C)] 98  F (36.7  C)  Pulse:  [64-76] " 64  Heart Rate:  [65-76] 71  Resp:  [15-18] 16  BP: ()/(61-94) 116/66  SpO2:  [94 %-99 %] 95 %  I/O's Last 24 hours  I/O last 3 completed shifts:  In: 302 [P.O.:100; I.V.:202]  Out: 800 [Emesis/NG output:800]    Constitutional: Awake, alert, cooperative, no apparent distress  Respiratory: Clear to auscultation bilaterally, no crackles or wheezing  Cardiovascular: Regular rate and rhythm, normal S1 and S2, and no murmur noted  GI: Normal bowel sounds, soft, non-distended, + umbilical tenderness  Skin/Integumen: No rashes, no cyanosis, no edema  Other:      Medications   All medications were reviewed.    dextrose 5% and 0.9% NaCl with potassium chloride 20 mEq 75 mL/hr at 02/28/18 0644       OLANZapine  5 mg Intramuscular Once        Data     Recent Labs  Lab 02/28/18  0743 02/27/18  1219   WBC 5.0 12.6*   HGB 14.1 15.4   MCV 99 98   * 154   INR  --  1.02    139   POTASSIUM 4.3 3.6   CHLORIDE 106 100   CO2 28 31   BUN 27 25   CR 1.09 1.21   ANIONGAP 5 8   TOBI 8.5 8.9   * 168*   ALBUMIN  --  3.7   PROTTOTAL  --  7.1   BILITOTAL  --  1.3   ALKPHOS  --  97   ALT  --  16   AST  --  13   LIPASE  --  92   TROPI  --  <0.015       Recent Results (from the past 24 hour(s))   CT Abdomen Pelvis w Contrast    Narrative    CT ABDOMEN/PELVIS WITH CONTRAST February 27, 2018 1:30 PM     HISTORY: Abdominal pain, left sided, concern for diverticulitis.    TECHNIQUE: 83 mL Isovue 370 IV. Radiation dose for this scan was  reduced using automated exposure control, adjustment of the mA and/or  kV according to patient size, or iterative reconstruction technique.    COMPARISON: None.    FINDINGS: Sigmoid diverticula. No evidence of diverticulitis. There  are dilated loops of proximal small bowel and decompressed distal  loops. There are mid small bowel loops which have nonspecific wall  thickening; this could relate to inflammation, ischemia, or infection.  This could be the cause of the small bowel obstruction.  Prostate  enlargement. Small amount of free fluid in the lower pelvis.  Unremarkable appendix. Emphysematous changes. Multiple chronic  appearing spinal compression deformities. Nothing else acute is seen  in the upper abdominal organs.       Impression    IMPRESSION:  1. Proximal small bowel obstruction. This may well be related to  abnormal wall thickening of the mid small bowel loops, as above.  2. Additional findings discussed above.     MD Adebayo ARNOLD MD  Text Page  (7am to 6pm)

## 2018-02-28 NOTE — PLAN OF CARE
Alert and oriented to self only. VSS. Denies pain. Occasional nausea, emesis x1 (unmeasured was on shirt looked less than 100 cc.) Refusing NG suction, there is a signed and held order for a NG suction per RN discretion. Up with one, impulsive. IVF running. Plan to continue to monitor.     NG tube now in place to low intermittent suction, PT had emesis and filled a container within 15 minutes of placement, brown/bile/green color. Since has put out 550 cc of same color output.

## 2018-02-28 NOTE — PLAN OF CARE
Problem: Patient Care Overview  Goal: Plan of Care/Patient Progress Review  Outcome: No Change  A&O x 2. Disoriented to place and situation. Patient much calmer and cooperative with care. VSS on room air. Lung sounds clear. Complained of abdominal pain. Given Tylenol as per prn order with sips of water. Complained of nausea afterwards. Was given Zofran prn. Same ineffective. Patient vomited 800 mls of greenish brown fluid. Was given prn Compazine. Voiced decrease in pain and relief from nausea and vomiting. Slept intermittently. Discharge pending progress. Continue to monitor.

## 2018-02-28 NOTE — PROGRESS NOTES
X-Cover:    Called regarding confusion and agitation that is not redirectable. Pulling at IV and demanding to leave. Oriented x 1.  Will order Haldol 2 mg IV x 1.

## 2018-02-28 NOTE — PLAN OF CARE
Problem: Patient Care Overview  Goal: Plan of Care/Patient Progress Review  Pt is alert to self only, confused, anxious, agitated and uncooperative. Frequently setting up bed alarm, non-redirectable. Given IV Haldol without result, Zypraxa PO order and pt refused, New order for Zypraxa IM ordered at shift change, passed night nurse. VSS on RA, denied pain/SOB. Receiving IVF at 75 cc/hr. Had couple sips of ice water in this shift. GI consulted. Discharge pending in progress.Will continue to monitor.

## 2018-03-01 LAB
ANION GAP SERPL CALCULATED.3IONS-SCNC: 3 MMOL/L (ref 3–14)
BUN SERPL-MCNC: 30 MG/DL (ref 7–30)
CALCIUM SERPL-MCNC: 8.3 MG/DL (ref 8.5–10.1)
CHLORIDE SERPL-SCNC: 105 MMOL/L (ref 94–109)
CO2 SERPL-SCNC: 31 MMOL/L (ref 20–32)
CREAT SERPL-MCNC: 1.3 MG/DL (ref 0.66–1.25)
GFR SERPL CREATININE-BSD FRML MDRD: 52 ML/MIN/1.7M2
GLUCOSE SERPL-MCNC: 127 MG/DL (ref 70–99)
POTASSIUM SERPL-SCNC: 4.4 MMOL/L (ref 3.4–5.3)
SODIUM SERPL-SCNC: 139 MMOL/L (ref 133–144)

## 2018-03-01 PROCEDURE — 25000128 H RX IP 250 OP 636: Performed by: PHYSICIAN ASSISTANT

## 2018-03-01 PROCEDURE — 36415 COLL VENOUS BLD VENIPUNCTURE: CPT | Performed by: INTERNAL MEDICINE

## 2018-03-01 PROCEDURE — 25800025 ZZH RX 258: Performed by: PHYSICIAN ASSISTANT

## 2018-03-01 PROCEDURE — 99231 SBSQ HOSP IP/OBS SF/LOW 25: CPT | Performed by: SURGERY

## 2018-03-01 PROCEDURE — 12000000 ZZH R&B MED SURG/OB

## 2018-03-01 PROCEDURE — 80048 BASIC METABOLIC PNL TOTAL CA: CPT | Performed by: INTERNAL MEDICINE

## 2018-03-01 PROCEDURE — 99232 SBSQ HOSP IP/OBS MODERATE 35: CPT | Performed by: INTERNAL MEDICINE

## 2018-03-01 PROCEDURE — 25000132 ZZH RX MED GY IP 250 OP 250 PS 637: Performed by: INTERNAL MEDICINE

## 2018-03-01 RX ORDER — ACETAMINOPHEN 10 MG/ML
1000 INJECTION, SOLUTION INTRAVENOUS EVERY 6 HOURS PRN
Status: DISCONTINUED | OUTPATIENT
Start: 2018-03-01 | End: 2018-03-16

## 2018-03-01 RX ORDER — BISACODYL 10 MG
10 SUPPOSITORY, RECTAL RECTAL ONCE
Status: COMPLETED | OUTPATIENT
Start: 2018-03-01 | End: 2018-03-01

## 2018-03-01 RX ADMIN — ACETAMINOPHEN 1000 MG: 10 INJECTION, SOLUTION INTRAVENOUS at 17:53

## 2018-03-01 RX ADMIN — POTASSIUM CHLORIDE, DEXTROSE MONOHYDRATE AND SODIUM CHLORIDE: 150; 5; 900 INJECTION, SOLUTION INTRAVENOUS at 18:02

## 2018-03-01 RX ADMIN — BISACODYL 10 MG: 10 SUPPOSITORY RECTAL at 14:50

## 2018-03-01 RX ADMIN — Medication 12.5 MG: at 22:12

## 2018-03-01 ASSESSMENT — ACTIVITIES OF DAILY LIVING (ADL)
DRESS: 0-->INDEPENDENT
SWALLOWING: 0-->SWALLOWS FOODS/LIQUIDS WITHOUT DIFFICULTY
RETIRED_EATING: 0-->INDEPENDENT
AMBULATION: 0-->INDEPENDENT
COGNITION: 1 - ATTENTION OR MEMORY DEFICITS
BATHING: 0-->INDEPENDENT
TRANSFERRING: 0-->INDEPENDENT
RETIRED_COMMUNICATION: 0-->UNDERSTANDS/COMMUNICATES WITHOUT DIFFICULTY
TOILETING: 0-->INDEPENDENT
FALL_HISTORY_WITHIN_LAST_SIX_MONTHS: NO

## 2018-03-01 NOTE — PROGRESS NOTES
"General Surgery Progress Note    Admission Date: 2/27/2018  Today's Date: 3/1/2018         Assessment:      Joseluis Falcon is a 85 year old male admitted for abdominal pain, nausea and vomiting with CT findings of dilated proximal small bowel         Plan:   - Place NG tube now, patient in agreement  - Once NG tube is in place and patient feeling better, ok for some ice chips and small sips of water for comfort  - Minimize narcotics, IV dilaudid and Ofirmev available for pain  - Bowel rest, patient unsure of bowel function  - Will increase IVFs to 125ml/hr for now given patient's NPO status and increase in creatinine today  - PCDs for DVT prophylaxis, encourage ambulation as tolerated    Dispo: continue to monitor, expect 2-3 more days in hospital        Interval History:   Afebrile, VSS on room air. Blood pressure a little soft at times. Patient developed worsening nausea with emesis yesterday afternoon. NG tube was placed with large output. Patient pulled out NG overnight and refused replacement. Today he reports return of central/periumbilical abdominal pain as well as nausea and frequent hiccups/belching. He states that he wishes he hadn't pulled the NG out and understands the benefit of it being in place. Unsure if he is passing gas or having BMs recently.           Physical Exam:   /66 (BP Location: Right arm)  Pulse 64  Temp 99.1  F (37.3  C) (Oral)  Resp 18  Ht 1.753 m (5' 9\")  Wt 74.8 kg (164 lb 14.5 oz)  SpO2 92%  BMI 24.35 kg/m2  I/O last 3 completed shifts:  In: 1803 [I.V.:1803]  Out: 2550 [Emesis/NG output:2550]  General: NAD, pleasant, alert, responds appropriately but struggles with recall at times  Respiratory: lungs clear to auscultation bilaterally without wheezes, rales or rhonchi   Abdomen: soft, tender to palpation mainly central abdomen, non distended, active bowel sounds  Extremities: no edema, no calf tenderness    LABS:  Recent Labs   Lab Test  02/28/18   0743  02/27/18   1219 "   WBC  5.0  12.6*   HGB  14.1  15.4   MCV  99  98   PLT  133*  154      Recent Labs   Lab Test  03/01/18   0736  02/28/18   0743  02/27/18   1219   POTASSIUM  4.4  4.3  3.6   CHLORIDE  105  106  100   CO2  31  28  31   BUN  30  27  25   CR  1.30*  1.09  1.21   ANIONGAP  3  5  8             Silvia Nelson PA-C  [facility]: [phone]  Pager: [phone] (7am-4pm)

## 2018-03-01 NOTE — PROGRESS NOTES
"[facility]  Hospitalist Progress Note  Adebayo Sosa MD  03/01/2018    Assessment & Plan   ASSESSMENT AND PLAN:  Mr. Joseluis Falcon is an 85-year-old male with a past medical history significant for psoriasis and bilateral inguinal herniorrhaphy, who presented to the ER today with complaints of pain in abdomen and vomiting and noted with a small bowel obstruction.   1.  Acute pain in abdomen and vomiting secondary to proximal small bowel obstruction   - hx of bilateral inguinal herniorrhaphy.    - continue NGT LIS  - will give suppository today.  - surgery consult appreciated, plan for gastroview challenge once output decreases.  2.  Likely acute on chronic renal failure.    - baseline creatinine is around 1-1.1.    - Current creatinine of  1.09 from 1.2, could be secondary to emesis and mild dehydration.    - continue IVF   - creatinine up to 1.3, monitor I/O  3.  Mild leukocytosis.    - no fever  - improved.  4.  Deep venous thrombosis prophylaxis:  Mechanical with PCD boots.       CODE STATUS:  This was discussed and he wishes to be full code.     Interval History   - pulled NGT out  - NGT replaced and had 1400 cc output  - confused, thinks he has psoriasis outbreak    -Data reviewed today: I reviewed all new labs and imaging over the last 24 hours. I personally reviewed no images or EKG's today.    Physical Exam   Heart Rate: 77, Blood pressure 107/66, pulse 64, temperature 99.1  F (37.3  C), temperature source Oral, resp. rate 18, height 1.753 m (5' 9\"), weight 74.8 kg (164 lb 14.5 oz), SpO2 92 %.  Vitals:    02/27/18 1212 02/28/18 0700   Weight: 74.8 kg (165 lb) 74.8 kg (164 lb 14.5 oz)     Vital Signs with Ranges  Temp:  [98  F (36.7  C)-99.1  F (37.3  C)] 99.1  F (37.3  C)  Heart Rate:  [77-84] 77  Resp:  [16-18] 18  BP: ()/(55-78) 107/66  SpO2:  [92 %-95 %] 92 %  I/O's Last 24 hours  I/O last 3 completed shifts:  In: 1803 [I.V.:1803]  Out: 2550 [Emesis/NG " output:2550]    Constitutional: Awake, alert, cooperative, no apparent distress  Respiratory: Clear to auscultation bilaterally, no crackles or wheezing  Cardiovascular: Regular rate and rhythm, normal S1 and S2, and no murmur noted  GI: Normal bowel sounds, soft, non-distended, + umbilical tenderness  Skin/Integumen: No rashes, no cyanosis, no edema  Other:      Medications   All medications were reviewed.    dextrose 5% and 0.9% NaCl with potassium chloride 20 mEq 125 mL/hr at 03/01/18 0856       QUEtiapine  12.5 mg Oral At Bedtime     OLANZapine  5 mg Intramuscular Once        Data     Recent Labs  Lab 03/01/18  0736 02/28/18  0743 02/27/18  1219   WBC  --  5.0 12.6*   HGB  --  14.1 15.4   MCV  --  99 98   PLT  --  133* 154   INR  --   --  1.02    139 139   POTASSIUM 4.4 4.3 3.6   CHLORIDE 105 106 100   CO2 31 28 31   BUN 30 27 25   CR 1.30* 1.09 1.21   ANIONGAP 3 5 8   TOBI 8.3* 8.5 8.9   * 130* 168*   ALBUMIN  --   --  3.7   PROTTOTAL  --   --  7.1   BILITOTAL  --   --  1.3   ALKPHOS  --   --  97   ALT  --   --  16   AST  --   --  13   LIPASE  --   --  92   TROPI  --   --  <0.015       No results found for this or any previous visit (from the past 24 hour(s)).    Adebayo Sosa MD  Text Page  (7am to 6pm)

## 2018-03-01 NOTE — PLAN OF CARE
Problem: Patient Care Overview  Goal: Plan of Care/Patient Progress Review  Outcome: No Change  Care Plan Summary Note: vss, alert to self only, mild pain to mid abd, mild nausea, no vomiting. hyperactive BS, lungs clear, on RA. Calm and cooperative with care. VPM in place and functioning properly. NG place at bedside to left nostril, marked at 60cm, to LIS, 1400 ml output during shift. Pt stated improvement in abd pain and nausea. Very tired and refused to ambulate in unit, will offer again in the afternoon. PIV patent and infusing. Surgery follow, NPO except for ice chips/sips of water. Updated daughter over the phone. ax1 with GB. Monitor.

## 2018-03-01 NOTE — PLAN OF CARE
Problem: Patient Care Overview  Goal: Plan of Care/Patient Progress Review  Outcome: No Change  Patient oriented to self only, confused and refusing assessment, VSS on RA, denies pain and nausea, no emesis this shift, pulled out NG tube, refusing to reinsert, tolerating sips of water,RN called patient daughter to encourage him, unable to get daughter, message left on her voice mail,VPM initiated, ambulate to bathroom with 1 assist/belt, no BM,can have sips and ice chip, IVF infusing at 75 ml/hr.

## 2018-03-02 LAB
ANION GAP SERPL CALCULATED.3IONS-SCNC: 6 MMOL/L (ref 3–14)
BUN SERPL-MCNC: 36 MG/DL (ref 7–30)
CALCIUM SERPL-MCNC: 8.4 MG/DL (ref 8.5–10.1)
CHLORIDE SERPL-SCNC: 111 MMOL/L (ref 94–109)
CO2 SERPL-SCNC: 26 MMOL/L (ref 20–32)
CREAT SERPL-MCNC: 1.25 MG/DL (ref 0.66–1.25)
GFR SERPL CREATININE-BSD FRML MDRD: 55 ML/MIN/1.7M2
GLUCOSE SERPL-MCNC: 166 MG/DL (ref 70–99)
POTASSIUM SERPL-SCNC: 4.6 MMOL/L (ref 3.4–5.3)
SODIUM SERPL-SCNC: 143 MMOL/L (ref 133–144)

## 2018-03-02 PROCEDURE — 80048 BASIC METABOLIC PNL TOTAL CA: CPT | Performed by: INTERNAL MEDICINE

## 2018-03-02 PROCEDURE — 25800025 ZZH RX 258: Performed by: PHYSICIAN ASSISTANT

## 2018-03-02 PROCEDURE — 99231 SBSQ HOSP IP/OBS SF/LOW 25: CPT | Performed by: SURGERY

## 2018-03-02 PROCEDURE — 25800025 ZZH RX 258: Performed by: INTERNAL MEDICINE

## 2018-03-02 PROCEDURE — 25000128 H RX IP 250 OP 636: Performed by: PHYSICIAN ASSISTANT

## 2018-03-02 PROCEDURE — 36415 COLL VENOUS BLD VENIPUNCTURE: CPT | Performed by: INTERNAL MEDICINE

## 2018-03-02 PROCEDURE — 12000000 ZZH R&B MED SURG/OB

## 2018-03-02 PROCEDURE — 25000128 H RX IP 250 OP 636: Performed by: INTERNAL MEDICINE

## 2018-03-02 PROCEDURE — 99232 SBSQ HOSP IP/OBS MODERATE 35: CPT | Performed by: INTERNAL MEDICINE

## 2018-03-02 PROCEDURE — 25000128 H RX IP 250 OP 636: Performed by: HOSPITALIST

## 2018-03-02 RX ADMIN — HYDROMORPHONE HYDROCHLORIDE 0.2 MG: 1 INJECTION, SOLUTION INTRAMUSCULAR; INTRAVENOUS; SUBCUTANEOUS at 01:55

## 2018-03-02 RX ADMIN — POTASSIUM CHLORIDE, DEXTROSE MONOHYDRATE AND SODIUM CHLORIDE: 150; 5; 900 INJECTION, SOLUTION INTRAVENOUS at 02:14

## 2018-03-02 RX ADMIN — HALOPERIDOL LACTATE 2 MG: 5 INJECTION, SOLUTION INTRAMUSCULAR at 01:30

## 2018-03-02 RX ADMIN — HYDROMORPHONE HYDROCHLORIDE 0.2 MG: 1 INJECTION, SOLUTION INTRAMUSCULAR; INTRAVENOUS; SUBCUTANEOUS at 03:56

## 2018-03-02 RX ADMIN — ACETAMINOPHEN 1000 MG: 10 INJECTION, SOLUTION INTRAVENOUS at 11:26

## 2018-03-02 RX ADMIN — POTASSIUM CHLORIDE, DEXTROSE MONOHYDRATE AND SODIUM CHLORIDE: 150; 5; 900 INJECTION, SOLUTION INTRAVENOUS at 23:00

## 2018-03-02 RX ADMIN — DIATRIZOATE MEGLUMINE AND DIATRIZOATE SODIUM 90 ML: 660; 100 SOLUTION ORAL; RECTAL at 18:46

## 2018-03-02 RX ADMIN — POTASSIUM CHLORIDE, DEXTROSE MONOHYDRATE AND SODIUM CHLORIDE: 150; 5; 900 INJECTION, SOLUTION INTRAVENOUS at 10:11

## 2018-03-02 NOTE — PROVIDER NOTIFICATION
MD Notification    Notified Person:  MD    Notified Persons Name: Dr. Terrazas    Notification Date/Time: 3/2/18 @ 0620    Notification Interaction:  Paged    Purpose of Notification: NG tube concern    Orders Received: Awaiting call back    Comments:

## 2018-03-02 NOTE — PROGRESS NOTES
"[facility]  Hospitalist Progress Note  Adebayo Sosa MD  03/02/2018    Assessment & Plan   ASSESSMENT AND PLAN:  Mr. Joseluis Falcon is an 85-year-old male with a past medical history significant for psoriasis and bilateral inguinal herniorrhaphy, who presented to the ER today with complaints of pain in abdomen and vomiting and noted with a small bowel obstruction.   1.  Acute pain in abdomen and vomiting secondary to proximal small bowel obstruction   - hx of bilateral inguinal herniorrhaphy.    - continue NGT LIS  - decrease in NG output  - surgery to consider gastroview challenge later on today.  2.  Likely acute on chronic renal failure.    - baseline creatinine is around 1-1.1, currently at 1.25  - could be secondary to emesis and mild dehydration.    - continue IVF but decrease rate to 75 cc/hr  3.  Mild leukocytosis.    - no fever  - improved.  4.  Deep venous thrombosis prophylaxis:  Mechanical with PCD boots.       CODE STATUS:  This was discussed and he wishes to be full code.     Interval History   - pulled NGT out  - NGT replaced and had 1400 cc output  - confused, thinks he has psoriasis outbreak    -Data reviewed today: I reviewed all new labs and imaging over the last 24 hours. I personally reviewed no images or EKG's today.    Physical Exam   Heart Rate: 75, Blood pressure 119/78, pulse 70, temperature 98.4  F (36.9  C), temperature source Axillary, resp. rate 18, height 1.753 m (5' 9\"), weight 74.8 kg (164 lb 14.5 oz), SpO2 96 %.  Vitals:    02/27/18 1212 02/28/18 0700   Weight: 74.8 kg (165 lb) 74.8 kg (164 lb 14.5 oz)     Vital Signs with Ranges  Temp:  [97.8  F (36.6  C)-99  F (37.2  C)] 98.4  F (36.9  C)  Pulse:  [70] 70  Heart Rate:  [55-85] 75  Resp:  [18] 18  BP: ()/() 119/78  SpO2:  [95 %-96 %] 96 %  I/O's Last 24 hours  I/O last 3 completed shifts:  In: 2201 [I.V.:2201]  Out: 2000 [Emesis/NG output:2000]    Constitutional: Awake, alert, cooperative, no apparent " distress  Respiratory: Clear to auscultation bilaterally, no crackles or wheezing  Cardiovascular: Regular rate and rhythm, normal S1 and S2, and no murmur noted  GI: Normal bowel sounds, soft, non-distended, + umbilical tenderness  Skin/Integumen: No rashes, no cyanosis, no edema  Other:      Medications   All medications were reviewed.    dextrose 5% and 0.9% NaCl with potassium chloride 20 mEq 125 mL/hr at 03/02/18 1011       QUEtiapine  12.5 mg Oral At Bedtime     OLANZapine  5 mg Intramuscular Once        Data     Recent Labs  Lab 03/02/18  0810 03/01/18  0736 02/28/18  0743 02/27/18  1219   WBC  --   --  5.0 12.6*   HGB  --   --  14.1 15.4   MCV  --   --  99 98   PLT  --   --  133* 154   INR  --   --   --  1.02    139 139 139   POTASSIUM 4.6 4.4 4.3 3.6   CHLORIDE 111* 105 106 100   CO2 26 31 28 31   BUN 36* 30 27 25   CR 1.25 1.30* 1.09 1.21   ANIONGAP 6 3 5 8   TOBI 8.4* 8.3* 8.5 8.9   * 127* 130* 168*   ALBUMIN  --   --   --  3.7   PROTTOTAL  --   --   --  7.1   BILITOTAL  --   --   --  1.3   ALKPHOS  --   --   --  97   ALT  --   --   --  16   AST  --   --   --  13   LIPASE  --   --   --  92   TROPI  --   --   --  <0.015       No results found for this or any previous visit (from the past 24 hour(s)).    Adebayo Sosa MD  Text Page  (7am to 6pm)

## 2018-03-02 NOTE — PLAN OF CARE
Problem: Patient Care Overview  Goal: Plan of Care/Patient Progress Review  Outcome: No Change  Pt alert and oriented to self only. BP 92/65. IVF at 125ml/hr. NPO except ice chips. NG in place at Low intermittent suction. Bowel sounds hypoactive. Up with assist of 1. VPM in place. D/c pending.

## 2018-03-02 NOTE — PLAN OF CARE
Problem: Patient Care Overview  Goal: Plan of Care/Patient Progress Review  Outcome: No Change  Alert & oriented to self, disoriented to time, place and situation, confused and agitated overnight, calming out of bed, prn haldol given for agitation with minimum effect, c/o abdominal pain, dilaudid given x 2, denies nausea, no vomiting, bowel sound faint, no flatus per patient, no bowel movement, NG tube in placed at low intermittent suction, brown drainage 600 ml, NPO except ice chips, tolerating ice chip without nausea, ambulate to bathroom with SBA, IVF infusing at 125 ml/hr.

## 2018-03-02 NOTE — PROVIDER NOTIFICATION
Spoke with Dr. Terrazas, to report that pts NG tube has been running at continuous, but unable to determine how long the setting has been incorrect.  Patient is complaining of a new abdominal pain, but he is a poor historian and unsure if actually new or not.  Pt continues to have moderate amount of returns, NG tube placement verified by auscultation.  Will continue to monitor.  Montserrat Grimes RN  3/2/2018

## 2018-03-02 NOTE — PROGRESS NOTES
"General Surgery Progress Note    Admission Date: 2/27/2018  Today's Date: 3/2/2018         Assessment:      Joseluis Falcon is a 85 year old male admitted for abdominal pain, nausea and vomiting with CT findings of dilated proximal small bowel         Plan:   - Continue NG to LIS, output is decreasing  - May consider gastrograffin challenge with f/u XR later today  - OK for some sips of water and ice chips, otherwise NPO  - Encourage ambulation, PCDs for DVT prophylaxis  - Anti-emetics prn  - Continue to minimize or avoid narcotics    Dispo: continue to monitor, hopefully home in the next few days once bowel function returns and able to tolerate diet         Interval History:   AFVSS. NG with 2000cc out yesterday, about 200cc out the past 6 hours. Patient reports some mild intermittent nausea and continued periumbilical pain. Does not feel any movement in his abdomen, was given a suppository with no result. Has been ambulating in the halls without difficulty. Creatinine improved, fluids turned back down to 75ml/hr.          Physical Exam:   /78 (BP Location: Left arm)  Pulse 70  Temp 98.4  F (36.9  C) (Axillary)  Resp 18  Ht 1.753 m (5' 9\")  Wt 74.8 kg (164 lb 14.5 oz)  SpO2 96%  BMI 24.35 kg/m2  I/O last 3 completed shifts:  In: 2201 [I.V.:2201]  Out: 2000 [Emesis/NG output:2000]  General: NAD, pleasant, alert and appropriate  Abdomen: soft, tender to palpation mainly central abdomen, no distention, minimal bowel sounds heard during my exam today  Extremities: no edema, no calf tenderness    LABS:  Recent Labs   Lab Test  02/28/18   0743  02/27/18   1219   WBC  5.0  12.6*   HGB  14.1  15.4   MCV  99  98   PLT  133*  154      Recent Labs   Lab Test  03/02/18   0810  03/01/18   0736  02/28/18   0743   POTASSIUM  4.6  4.4  4.3   CHLORIDE  111*  105  106   CO2  26  31  28   BUN  36*  30  27   CR  1.25  1.30*  1.09   ANIONGAP  6  3  5                 Silvia Nelson PA-C  [facility]: " [phone]  Pager: [phone] (7am-4pm)

## 2018-03-02 NOTE — PLAN OF CARE
Problem: Patient Care Overview  Goal: Plan of Care/Patient Progress Review  Outcome: No Change  Care Plan Summary Note: vss, alert to self only, constantly setting off bed/chair alarm/VPM alarm due to the need to have a BM but not successful. Redirectable, but very forgetful. Hypoactive BS, not passing gas, NG at 60cm, LIS, 500 ml brown output during shift. Moderate pain to abd and HA, PRN tylenol IV given with some relief. SBA x1. PIV patent and infusing. Seen by surgery, will give Gastroview at 1600, and abx in am.  Updated wife and daughter. Per daughter, pt is confused at baseline, but had been increasingy more confused over the last several months. Stable, monitor.

## 2018-03-03 ENCOUNTER — APPOINTMENT (OUTPATIENT)
Dept: GENERAL RADIOLOGY | Facility: CLINIC | Age: 83
DRG: 329 | End: 2018-03-03
Attending: INTERNAL MEDICINE
Payer: COMMERCIAL

## 2018-03-03 ENCOUNTER — APPOINTMENT (OUTPATIENT)
Dept: OCCUPATIONAL THERAPY | Facility: CLINIC | Age: 83
DRG: 329 | End: 2018-03-03
Attending: INTERNAL MEDICINE
Payer: COMMERCIAL

## 2018-03-03 ENCOUNTER — APPOINTMENT (OUTPATIENT)
Dept: PHYSICAL THERAPY | Facility: CLINIC | Age: 83
DRG: 329 | End: 2018-03-03
Attending: INTERNAL MEDICINE
Payer: COMMERCIAL

## 2018-03-03 ENCOUNTER — APPOINTMENT (OUTPATIENT)
Dept: GENERAL RADIOLOGY | Facility: CLINIC | Age: 83
DRG: 329 | End: 2018-03-03
Attending: SURGERY
Payer: COMMERCIAL

## 2018-03-03 ENCOUNTER — ANESTHESIA EVENT (OUTPATIENT)
Dept: SURGERY | Facility: CLINIC | Age: 83
DRG: 329 | End: 2018-03-03
Payer: COMMERCIAL

## 2018-03-03 PROCEDURE — 97161 PT EVAL LOW COMPLEX 20 MIN: CPT | Mod: GP

## 2018-03-03 PROCEDURE — 97535 SELF CARE MNGMENT TRAINING: CPT | Mod: GO

## 2018-03-03 PROCEDURE — 12000000 ZZH R&B MED SURG/OB

## 2018-03-03 PROCEDURE — 40000141 ZZH STATISTIC PERIPHERAL IV START W/O US GUIDANCE

## 2018-03-03 PROCEDURE — 97110 THERAPEUTIC EXERCISES: CPT | Mod: GP

## 2018-03-03 PROCEDURE — 25000132 ZZH RX MED GY IP 250 OP 250 PS 637: Performed by: INTERNAL MEDICINE

## 2018-03-03 PROCEDURE — 74019 RADEX ABDOMEN 2 VIEWS: CPT

## 2018-03-03 PROCEDURE — 99232 SBSQ HOSP IP/OBS MODERATE 35: CPT | Mod: 57 | Performed by: SURGERY

## 2018-03-03 PROCEDURE — 40000193 ZZH STATISTIC PT WARD VISIT

## 2018-03-03 PROCEDURE — 40000133 ZZH STATISTIC OT WARD VISIT

## 2018-03-03 PROCEDURE — 74019 RADEX ABDOMEN 2 VIEWS: CPT | Mod: 77

## 2018-03-03 PROCEDURE — 97166 OT EVAL MOD COMPLEX 45 MIN: CPT | Mod: GO

## 2018-03-03 PROCEDURE — 97530 THERAPEUTIC ACTIVITIES: CPT | Mod: GO

## 2018-03-03 PROCEDURE — 25800025 ZZH RX 258: Performed by: INTERNAL MEDICINE

## 2018-03-03 PROCEDURE — 99232 SBSQ HOSP IP/OBS MODERATE 35: CPT | Performed by: INTERNAL MEDICINE

## 2018-03-03 RX ADMIN — Medication 12.5 MG: at 22:34

## 2018-03-03 RX ADMIN — POTASSIUM CHLORIDE, DEXTROSE MONOHYDRATE AND SODIUM CHLORIDE: 150; 5; 900 INJECTION, SOLUTION INTRAVENOUS at 17:32

## 2018-03-03 NOTE — PLAN OF CARE
Problem: Patient Care Overview  Goal: Plan of Care/Patient Progress Review  Outcome: No Change  Care Plan Summary Note: vss, alert to self only. Hypoactive BS, lungs clear, on RA. Abd soft, some tenderness to palpation. Not passing gas, no BM. Continent of bladder. NG to LIS, marked at 60cm, 700 ml green drainage during shift. Abx repeated at 1000, result updated to MD. No new order. Continue to be NPO except for ice chips. PIV infiltrated twice. SBA x1 with GB, ambulated in unit several times during shift. VPM in room, working properly. Pt follows command but impulsive at times. Stable, monitor.

## 2018-03-03 NOTE — PROGRESS NOTES
" 03/03/18 1056   Quick Adds   Type of Visit Initial Occupational Therapy Evaluation   Living Environment   Lives With alone   Living Arrangements condominium   Home Accessibility stairs to enter home   Number of Stairs to Enter Home 3   Number of Stairs Within Home 0   Transportation Available car   Living Environment Comment Above per pt report who is not a reliable historian   Self-Care   Usual Activity Tolerance good   Current Activity Tolerance moderate   Regular Exercise no   Equipment Currently Used at Home walker, rolling  (one comfort ht toilet stool (another std),  4ww, FWW)   Activity/Exercise/Self-Care Comment Pt reports he rarely uses walkers (belonged to his wife who moved to [facility])   Functional Level Prior   Ambulation 0-->independent   Transferring 0-->independent   Toileting 0-->independent   Bathing 0-->independent   Dressing 0-->independent   Eating 0-->independent   Communication 0-->understands/communicates without difficulty   Swallowing 0-->swallows foods/liquids without difficulty   Cognition 1 - attention or memory deficits   Fall history within last six months no   Which of the above functional risks had a recent onset or change? none   Prior Functional Level Comment Pt reports he may move to [facility] care apt with his wife as \"can't keep up with the house\". Per chart, daughter reported pt demonstrating increased confusion over past few months. Pt reports being indep driving, shopping, finances and he reports he takes no meds.   General Information   Onset of Illness/Injury or Date of Surgery - Date 03/01/18   Referring Physician Dr. Sosa   Patient/Family Goals Statement return home   Additional Occupational Profile Info/Pertinent History of Current Problem admitted with abdominal pain and vomiting--diagnosed SBO and being treated medically. See H&P for PMH.   Precautions/Limitations fall precautions   Cognitive Status Examination   Orientation person;other (see comments)   Level of " Consciousness alert   Able to Follow Commands success, 1-step commands   Personal Safety (Cognitive) decreased awareness, need for safety   Memory impaired   Cognitive Comment Not oriented to month, year, state (states he is in [place]), place. Did explain accurately that spouse recently placed in memory care.     Visual Perception   Visual Perception No deficits were identified   Pain Assessment   Patient Currently in Pain No   Range of Motion (ROM)   ROM Quick Adds No deficits were identified   Strength   Manual Muscle Testing Quick Adds No deficits were identified   Mobility   Bed Mobility Comments SBA   Transfer Skills   Transfer Comments SBA   Transfer Skill: Bed to Chair/Chair to Bed   Level of [place]: Bed to Chair stand-by assist   Physical Assist/Nonphysical Assist: Bed to Chair supervision;verbal cues   Assistive Device - Transfer Skill Bed to Chair Chair to Bed Rehab Eval rolling walker   Transfer Skill: Sit to Stand   Level of [place]: Sit/Stand stand-by assist   Physical Assist/Nonphysical Assist: Sit/Stand verbal cues;supervision   Assistive Device for Transfer: Sit/Stand rolling walker   Transfer Skill: Toilet Transfer   Level of [place]: Toilet stand-by assist   Physical Assist/Nonphysical Assist: Toilet supervision;verbal cues   Assistive Device rolling walker   Lower Body Dressing   Level of [place]: Dress Lower Body stand-by assist   Physical Assist/Nonphysical Assist: Dress Lower Body set-up required;supervision;verbal cues   Toileting   Level of [place]: Toilet stand-by assist   Physical Assist/Nonphysical Assist: Toilet supervision;set-up required;verbal cues   Assistive Device rolling walker   Eating/Self Feeding   Level of [place]: Eating independent   Physical Assist/Nonphysical Assist: Eating set-up required   Activities of Daily Living Analysis   Impairments Contributing to Impaired Activities of Daily Living strength decreased;cognition impaired   General Therapy  "Interventions   Planned Therapy Interventions ADL retraining;IADL retraining;cognition   Clinical Impression   Criteria for Skilled Therapeutic Interventions Met yes, treatment indicated   OT Diagnosis decreased ADL/IADL performance   Influenced by the following impairments weakness, cognition   Assessment of Occupational Performance 3-5 Performance Deficits   Identified Performance Deficits household mgmt, med/financial mgmt, community mob, functional mob   Clinical Decision Making (Complexity) Moderate complexity   Therapy Frequency daily   Predicted Duration of Therapy Intervention (days/wks) 3 days   Anticipated Discharge Disposition Transitional Care Facility;Other (see comments)  (eventual move to higher level of care, possiby with spouse)   Risks and Benefits of Treatment have been explained. Yes   Patient, Family & other staff in agreement with plan of care Yes   [facility] [employer] TM \"6 Clicks\"   2016, Trustees of [facility], under license to [employer].  All rights reserved.   6 Clicks Short Forms Daily Activity Inpatient Short Form   [facility] [employer]  \"6 Clicks\" Daily Activity Inpatient Short Form   1. Putting on and taking off regular lower body clothing? 4 - None   2. Bathing (including washing, rinsing, drying)? 3 - A Little   3. Toileting, which includes using toilet, bedpan or urinal? 3 - A Little   4. Putting on and taking off regular upper body clothing? 3 - A Little   5. Taking care of personal grooming such as brushing teeth? 4 - None   6. Eating meals? 4 - None   Daily Activity Raw Score (Score out of 24.Lower scores equate to lower levels of function) 21   Total Evaluation Time   Total Evaluation Time (Minutes) 15     "

## 2018-03-03 NOTE — PLAN OF CARE
Problem: Patient Care Overview  Goal: Plan of Care/Patient Progress Review  Outcome: No Change  Oriented to person and place.  SBA.  NPO ex. ice chips and sips of water.  VSS on RA.  Running IVF.  Gave Gastrogaffin via NG at 1845 and clamped NG after per instruction.  450 mL of brown drainage from NG between 1500 and 1845.  Abd X-Ray in AM for follow-up of SBO.  No nausea/vomiting.  If N/V, restart NG at LIS.  RLQ and LLQ tender to palpation.  BS hypoactive throughout.  Titus flatus.  Tolerates PCD's.  VPM in room.  Attempts to pull NG at times; memory loss makes this more troublesome.  Denied pain.  PT, OT, SW consults.  Discharge pending.

## 2018-03-03 NOTE — PLAN OF CARE
"Problem: Patient Care Overview  Goal: Plan of Care/Patient Progress Review  OT eval completed and treatment initiated. Pt admitted with abdominal pain, vomiting and diagnosed SBO. Pt is not reliable  however he states that he is now living alone (spouse has been moved to memory care facility) in a condo, 3 steps to enter and none inside. He reports being indep mobility without use of an AD and indep with all self-cares, household tasks including meal prep, laundry and also with financial mgmt and driving. He has one daughter who lives nearby who checks on him. He stated that he may be moving in with his wife soon as he is \"getting tired of managing the house\".  Discharge Planner OT   Patient plan for discharge: stated \"I'm not sure\"  Current status: pt pleasant and cooperative, oriented to self, , not month, year, place or state ([facility] during session). Pt followed 1-step directives consistently however demonstrated impairment in immediate recall, safety awareness throughout session. Does not remember to use call light despite setting off bed alarm repeatedly.  Performed bed mob, sit-stand, bed <>chair and toilet transfer with SBA and cues for safe walker use and to attend to IV lines. Performed dressing tasks with set-up, verbal cues and SBA.   Barriers to return to prior living situation: lives alone, impaired cognition/safety awareness  Recommendations for discharge: TCU and anticipate move to higher level of care  Rationale for recommendations: OT to complete cognitive assessment however based on clinical observation pt does not appear able to manage safely at home alone at this time       Entered by: Emiliano Paige 2018 11:50 AM         "

## 2018-03-03 NOTE — PLAN OF CARE
"Problem: Patient Care Overview  Goal: Plan of Care/Patient Progress Review  PT: Orders received, chart reviewed, eval completed and treatment initiated. Pt is an 84 y/o male admitted 2/27/18 for further evaluation of abdominal pain, vomiting, diagnosed with SBO. Pt is not a reliable historian, but reports he lives alone in a condo with 3-4 stairs to enter, then his living space is on the main level. Does not use an AD, is IND with daily activities, household cares and running errands and still drives. Reports his youngest daughter is \"the light of his life\" and is medically and financially responsible for him and helps out as needed. Pt's wife resides in a memory care facility.    Discharge Planner PT   Patient plan for discharge: none stated  Current status: Completes bed mobility with SBA, sit<>Stand transfers with SBA, gait >500' with FWW and SBA. Takes 1 standing rest break. Demonstrates difficulty dual tasking. Requires MinAx1 for gait without use of AD.   Barriers to return to prior living situation: stairs (to be addressed in therapy), lives alone, decreased activity tolerance, decreased balance, cognitive status  Recommendations for discharge: home with supervision with functional mobility, use of FWW and HHPT  Rationale for recommendations: from a mobility standpoint pt does not require physical assist with use of FWW, demonstrates safe movement, if pt denies FWW or does not have family assist for supervision to ensure use of walker at discharge pt would most benefit from TCU to maximize IND; defer further recommendations for discharge d/t cognitive status to OT        Entered by: Liat Mcbride 03/03/2018 4:19 PM           "

## 2018-03-03 NOTE — PROGRESS NOTES
"[facility]    Internal Medicine Hospitalist Progress Note  03/03/2018  I evaluated patient on the above date.    Juventino Lauren Jr., MD  [phone] (p)  Text Page (7 am to 6 pm)      Assessment & Plan   Mr. Joseluis Falcon is an 85-year-old male with a past medical history significant for psoriasis and bilateral inguinal herniorrhaphy, who presented 2/27 with complaints of pain in abdomen and vomiting and noted with a small bowel obstruction.     1. SBO vs ileus.  Hx of bilateral inguinal herniorrhaphy. CT 2/27 showed proximal SBO with abnormal wall thickening of the mid SB loops. NG placed 3/1. Challenged with Gastrografin 3/3 and axr showed some contrast in proximal R colon and AFL, with some suggestion of adynamic ileus rather than obstruction.  - Continue NG tube decompression.  - Continue NPO and IVF's.  - Continue PRN IV acetaminophen, PRN IV Dilaudid (minimize opioids as able).  - Ambulate.  - Appreciate help from Surgery.    2. Acute kidney injury, stage 1, suspect prerenal.  Cr up to 1.30 on 3/1.   Recent Labs  Lab 03/02/18  0810 03/01/18  0736 02/28/18  0743 02/27/18  1219   CR 1.25 1.30* 1.09 1.21   - Continue IVF's.  - Monitor BMP.  - Avoid nephrotoxic medications.    3. Acute delirium.  Confused and agitated requiring Haldol, Zyprexa at times.  - Continue Seroquel 12.5 mg qHS.   - Continue PRN Zyprexa and PRN Haldol.    Prophylaxis.  - PCD's, ambulation.    CODE STATUS: FULL.    Dispo.  - Pending above.    Interval History   Doing OK. Feels abdomen is firm.     -Data reviewed today: I reviewed all new labs and imaging over the last 24 hours. I personally reviewed no images or EKG's today.    Physical Exam   Heart Rate: 75, Blood pressure 114/74, pulse 85, temperature 98.4  F (36.9  C), temperature source Oral, resp. rate 16, height 1.753 m (5' 9\"), weight 74.8 kg (164 lb 14.5 oz), SpO2 94 %.  Vitals:    02/27/18 1212 02/28/18 0700   Weight: 74.8 kg (165 lb) 74.8 kg (164 lb 14.5 oz)     Vital " Signs with Ranges  Temp:  [98.1  F (36.7  C)-98.7  F (37.1  C)] 98.4  F (36.9  C)  Pulse:  [85] 85  Heart Rate:  [74-89] 75  Resp:  [16-18] 16  BP: (105-134)/() 114/74  SpO2:  [92 %-95 %] 94 %  Patient Vitals for the past 24 hrs:   BP Temp Temp src Pulse Heart Rate Resp SpO2   03/03/18 0728 114/74 98.4  F (36.9  C) Oral 85 75 16 94 %   03/03/18 0551 125/54 - - - - - -   03/03/18 0112 (!) 134/106 98.1  F (36.7  C) Oral - 89 16 92 %   03/02/18 1520 105/67 98.7  F (37.1  C) Axillary - 74 18 95 %     I/O's Last 24 hours  I/O last 3 completed shifts:  In: 2092 [I.V.:2032; NG/GT:60]  Out: 3725 [Emesis/NG output:3725]    Constitutional: Alert, pleasant.  Respiratory: Diminished in bases. No crackles or wheezes.  Cardiovascular: RRR no m/r/g.  GI: Soft, nd, hypoactive, no r/g tenderness to light touch.  Skin/Integumen:   Other:        Data     Recent Labs  Lab 03/02/18  0810 03/01/18  0736 02/28/18  0743 02/27/18  1219   WBC  --   --  5.0 12.6*   HGB  --   --  14.1 15.4   MCV  --   --  99 98   PLT  --   --  133* 154   INR  --   --   --  1.02    139 139 139   POTASSIUM 4.6 4.4 4.3 3.6   CHLORIDE 111* 105 106 100   CO2 26 31 28 31   BUN 36* 30 27 25   CR 1.25 1.30* 1.09 1.21   ANIONGAP 6 3 5 8   TOBI 8.4* 8.3* 8.5 8.9   * 127* 130* 168*   ALBUMIN  --   --   --  3.7   PROTTOTAL  --   --   --  7.1   BILITOTAL  --   --   --  1.3   ALKPHOS  --   --   --  97   ALT  --   --   --  16   AST  --   --   --  13   LIPASE  --   --   --  92   TROPI  --   --   --  <0.015     Recent Labs   Lab Test  03/02/18   0810  03/01/18   0736  02/28/18   0743  02/27/18   1219 01/09/17   GLC  166*  127*  130*  168*  101*         Recent Results (from the past 24 hour(s))   XR Abdomen 2 Views    Narrative    ABDOMEN TWO VIEWS 3/3/2018 6:15 AM     HISTORY: Recheck small bowel obstruction after Gastroview challenge.     COMPARISON: CT abdomen and pelvis 2/27/2018.      Impression    IMPRESSION: Two views of the abdomen are performed.  Nasogastric tube  terminates below the left hemidiaphragm presumably in the stomach.  Oral contrast is visualized and extends through multiple loops of  mildly dilated small bowel. Scattered air-fluid levels are present on  the upright view. Follow-up imaging to assess for progression of oral  contrast as needed. Suggest follow-up imaging in 3 hours from the time  of this exam.    EDI NELSON MD   XR Abdomen 2 Views    Narrative    ABDOMEN TWO VIEWS 3/3/2018 10:29 AM     HISTORY: Recheck abdominal x-ray after Gastrografin challenge.      COMPARISON: Abdominal x-ray 3/3/2018 at 0607 hours.      Impression    IMPRESSION: Gastroview contrast is becoming less dense over time but  there appears to be some contrast in the proximal right colon in the  lateral x-ray. Air-fluid levels are again noted on the upright view  suggesting adynamic ileus rather than obstruction. Fibrotic right lung  base changes are noted. Nasogastric tube terminates below the left  hemidiaphragm.    EDI NELSON MD       Medications   All medications were reviewed.    dextrose 5% and 0.9% NaCl with potassium chloride 20 mEq 75 mL/hr at 03/03/18 0858       QUEtiapine  12.5 mg Oral At Bedtime     OLANZapine  5 mg Intramuscular Once

## 2018-03-03 NOTE — PROGRESS NOTES
" 03/03/18 1500   Quick Adds   Type of Visit Initial PT Evaluation   Living Environment   Lives With alone   Living Arrangements condominium   Home Accessibility stairs to enter home   Number of Stairs to Enter Home 3   Number of Stairs Within Home 0   Stair Railings at Home none   Transportation Available car;family or friend will provide  (pt reports drives at baseline, daughter transported to ED)   Living Environment Comment pt report same as reported to OT this AM however per chart pt is not a reliable historian   Self-Care   Usual Activity Tolerance good   Current Activity Tolerance moderate   Regular Exercise yes   Equipment Currently Used at Home walker, rolling  (one comfort ht toilet stool (another std),  4ww, FWW)   Activity/Exercise/Self-Care Comment Pt reports he has a memborship to the [facility]/ gym for working out, unable to expand on what he does but replies yes to all options given including swimming, biking, walking, weight training.Pt reports he rarely uses walkers (belonged to his wife who moved to memory care)   Functional Level Prior   Ambulation 0-->independent   Transferring 0-->independent   Toileting 0-->independent   Bathing 0-->independent   Dressing 0-->independent   Eating 0-->independent   Communication 0-->understands/communicates without difficulty   Swallowing 0-->swallows foods/liquids without difficulty   Fall history within last six months no   Which of the above functional risks had a recent onset or change? ambulation   Prior Functional Level Comment Pt reports he may move to memory care apt with his wife as \"can't keep up with the house\". Per chart, daughter reported pt demonstrating increased confusion over past few months. Pt reports being indep driving, shopping, finances and he reports he takes no meds.   General Information   Onset of Illness/Injury or Date of Surgery - Date 02/27/18  (PT referal received 3/2)   Referring Physician Adebayo Sosa MD "   Patient/Family Goals Statement none stated   Pertinent History of Current Problem (include personal factors and/or comorbidities that impact the POC) Pt is an 86 y/o male admitted 2/27/18 for further evaluation of abdominal pain, vomiting, diagnosed with SBO. Has NG tube, still having further medical work up.   Precautions/Limitations fall precautions   General Info Comments activity: up ad jey   Cognitive Status Examination   Orientation person;place  (and situation)   Level of Consciousness alert;confused  (confused at times)   Follows Commands and Answers Questions 100% of the time;able to follow single-step instructions   Personal Safety and Judgment at risk behaviors demonstrated;impulsive   Memory impaired   Cognitive Comment pt with difficulty answering questions appropriately based on time, often refers to when he was in his 60s or 70s   Pain Assessment   Patient Currently in Pain Yes, see Vital Sign flowsheet  (does not rate, reports unchanged abd pain )   Posture    Posture Forward head position;Protracted shoulders   Range of Motion (ROM)   ROM Quick Adds No deficits were identified   ROM Comment BLEs WFL   Strength   Manual Muscle Testing Quick Adds No deficits were identified   Strength Comments B LEs WFL grossly 4/5   Bed Mobility   Bed Mobility Comments pt completes supine<>sit with SBA   Transfer Skills   Transfer Comments pt completes sit<>Stand with SBA   Gait   Gait Comments pt ambulates x5' without AD, requires MinAx1, slowed shuffling pattern, reaches out for objects to steady self   Balance   Balance Comments pt requires increased use ofAD to maintainstanding balance, good seated balance does not use AD at baseline   Sensory Examination   Sensory Perception Comments denies numbness and tingling   General Therapy Interventions   Planned Therapy Interventions balance training;bed mobility training;gait training;transfer training;progressive activity/exercise   Clinical Impression   Criteria for  "Skilled Therapeutic Intervention yes, treatment indicated   PT Diagnosis difficulty with gait   Influenced by the following impairments  balance, activity tolerance, increased pain   Functional limitations due to impairments difficulty with bed mobility, transfers, gait and stairs   Clinical Presentation Evolving/Changing   Clinical Presentation Rationale pt with further medical workup re: SBO   Clinical Decision Making (Complexity) Low complexity   Therapy Frequency` other (see comments)  (4x/wk)   Predicted Duration of Therapy Intervention (days/wks) 1 week   Anticipated Discharge Disposition Home with Assist;Home with Home Therapy   Risk & Benefits of therapy have been explained Yes   Patient, Family & other staff in agreement with plan of care Yes   Clinical Impression Comments if family unable to provide assist recommend TCU, defer further d/c recs d/t cog status to OT   [facility]-PAC TM \"6 Clicks\"   2016, Trustees of [facility], under license to [employer].  All rights reserved.   6 Clicks Short Forms Basic Mobility Inpatient Short Form   [facility] AM-PAC  \"6 Clicks\" V.2 Basic Mobility Inpatient Short Form   1. Turning from your back to your side while in a flat bed without using bedrails? 4 - None   2. Moving from lying on your back to sitting on the side of a flat bed without using bedrails? 3 - A Little   3. Moving to and from a bed to a chair (including a wheelchair)? 3 - A Little   4. Standing up from a chair using your arms (e.g., wheelchair, or bedside chair)? 3 - A Little   5. To walk in hospital room? 3 - A Little   6. Climbing 3-5 steps with a railing? 3 - A Little   Basic Mobility Raw Score (Score out of 24.Lower scores equate to lower levels of function) 19   Total Evaluation Time   Total Evaluation Time (Minutes) 10     "

## 2018-03-03 NOTE — PROGRESS NOTES
Surgery    Patient reports no significant change.  No bowel movement.  Admits to a very small amount of flatus.  Continues to have nausea with increased NG output.  Does not report any significant pain today.  No fevers or chills.    Abdomen- mild distention.  No significant tenderness.  No rebound or guarding.    A/P  Gastroview challenge reviewed.  X-ray shows some contrast possibly in right colon.  He continues to have increased nausea and has not had significant bowel function.   If he develops any increase in symptoms or does not have return of his bowel function tomorrow would strongly consider exploratory laparoscopy or repeat CT given his prolonged course and lack of improvement.  The patient is in agreement.  Will follow up in a.m.  Can keep NG tube clamped and less nausea today.    Robel Gomez M.D.  [facility]  [phone]

## 2018-03-03 NOTE — PROVIDER NOTIFICATION
MD Notification    Notified Person:  MD    Notified Persons Name: Xin    Notification Date/Time:3/3/2018 0900      Notification Interaction:  Paged Physician    Purpose of Notification: abx result, recommending repeat abx in 3 hours from time of procedure.     Orders Received: pending    Comments:

## 2018-03-03 NOTE — PLAN OF CARE
Problem: Patient Care Overview  Goal: Plan of Care/Patient Progress Review  Outcome: No Change  Disoriented to place, time, and situation. Full code. Here w/ SBO. VSS on RA. BS hypoactive. Abdominal x-ray at 0600 today. NPO except meds, ice, and sips of liquid. Surgery consulted. NG tube has been clamped. Had N/V intermittently throughout shift, to be connected to IMS again. IV fluids infusing. Up w/ one assist. Impulsive at times, VPM in place. Fall precautions in place. Denied pain. D/C pending. Will cont. To monitor.

## 2018-03-04 ENCOUNTER — APPOINTMENT (OUTPATIENT)
Dept: GENERAL RADIOLOGY | Facility: CLINIC | Age: 83
DRG: 329 | End: 2018-03-04
Attending: SURGERY
Payer: COMMERCIAL

## 2018-03-04 ENCOUNTER — ANESTHESIA (OUTPATIENT)
Dept: SURGERY | Facility: CLINIC | Age: 83
DRG: 329 | End: 2018-03-04
Payer: COMMERCIAL

## 2018-03-04 LAB
ABO + RH BLD: NORMAL
ABO + RH BLD: NORMAL
BLD GP AB SCN SERPL QL: NORMAL
BLOOD BANK CMNT PATIENT-IMP: NORMAL
HGB BLD-MCNC: 14.1 G/DL (ref 13.3–17.7)
SPECIMEN EXP DATE BLD: NORMAL

## 2018-03-04 PROCEDURE — 25000128 H RX IP 250 OP 636: Performed by: INTERNAL MEDICINE

## 2018-03-04 PROCEDURE — 36000056 ZZH SURGERY LEVEL 3 1ST 30 MIN: Performed by: SURGERY

## 2018-03-04 PROCEDURE — 25000128 H RX IP 250 OP 636: Performed by: PHYSICIAN ASSISTANT

## 2018-03-04 PROCEDURE — 27210995 ZZH RX 272: Performed by: SURGERY

## 2018-03-04 PROCEDURE — 25000128 H RX IP 250 OP 636: Performed by: NURSE ANESTHETIST, CERTIFIED REGISTERED

## 2018-03-04 PROCEDURE — 25000132 ZZH RX MED GY IP 250 OP 250 PS 637: Performed by: INTERNAL MEDICINE

## 2018-03-04 PROCEDURE — 86901 BLOOD TYPING SEROLOGIC RH(D): CPT | Performed by: ANESTHESIOLOGY

## 2018-03-04 PROCEDURE — 25000125 ZZHC RX 250: Performed by: NURSE ANESTHETIST, CERTIFIED REGISTERED

## 2018-03-04 PROCEDURE — 25000125 ZZHC RX 250: Performed by: SURGERY

## 2018-03-04 PROCEDURE — 25000128 H RX IP 250 OP 636: Performed by: ANESTHESIOLOGY

## 2018-03-04 PROCEDURE — 88307 TISSUE EXAM BY PATHOLOGIST: CPT | Performed by: SURGERY

## 2018-03-04 PROCEDURE — 86850 RBC ANTIBODY SCREEN: CPT | Performed by: ANESTHESIOLOGY

## 2018-03-04 PROCEDURE — 44202 LAP ENTERECTOMY: CPT | Performed by: SURGERY

## 2018-03-04 PROCEDURE — 25800025 ZZH RX 258: Performed by: INTERNAL MEDICINE

## 2018-03-04 PROCEDURE — 5A2204Z RESTORATION OF CARDIAC RHYTHM, SINGLE: ICD-10-PCS | Performed by: ANESTHESIOLOGY

## 2018-03-04 PROCEDURE — 44202 LAP ENTERECTOMY: CPT | Mod: AS | Performed by: PHYSICIAN ASSISTANT

## 2018-03-04 PROCEDURE — 36415 COLL VENOUS BLD VENIPUNCTURE: CPT | Performed by: ANESTHESIOLOGY

## 2018-03-04 PROCEDURE — 37000008 ZZH ANESTHESIA TECHNICAL FEE, 1ST 30 MIN: Performed by: SURGERY

## 2018-03-04 PROCEDURE — 99232 SBSQ HOSP IP/OBS MODERATE 35: CPT | Performed by: INTERNAL MEDICINE

## 2018-03-04 PROCEDURE — 25000566 ZZH SEVOFLURANE, EA 15 MIN: Performed by: SURGERY

## 2018-03-04 PROCEDURE — 12000007 ZZH R&B INTERMEDIATE

## 2018-03-04 PROCEDURE — 0DBB0ZZ EXCISION OF ILEUM, OPEN APPROACH: ICD-10-PCS | Performed by: SURGERY

## 2018-03-04 PROCEDURE — 86900 BLOOD TYPING SEROLOGIC ABO: CPT | Performed by: ANESTHESIOLOGY

## 2018-03-04 PROCEDURE — 85018 HEMOGLOBIN: CPT | Performed by: ANESTHESIOLOGY

## 2018-03-04 PROCEDURE — 27210794 ZZH OR GENERAL SUPPLY STERILE: Performed by: SURGERY

## 2018-03-04 PROCEDURE — 40000169 ZZH STATISTIC PRE-PROCEDURE ASSESSMENT I: Performed by: SURGERY

## 2018-03-04 PROCEDURE — 74019 RADEX ABDOMEN 2 VIEWS: CPT

## 2018-03-04 PROCEDURE — 37000009 ZZH ANESTHESIA TECHNICAL FEE, EACH ADDTL 15 MIN: Performed by: SURGERY

## 2018-03-04 PROCEDURE — 25000128 H RX IP 250 OP 636: Performed by: HOSPITALIST

## 2018-03-04 PROCEDURE — 0DNU4ZZ RELEASE OMENTUM, PERCUTANEOUS ENDOSCOPIC APPROACH: ICD-10-PCS | Performed by: SURGERY

## 2018-03-04 PROCEDURE — 88307 TISSUE EXAM BY PATHOLOGIST: CPT | Mod: 26 | Performed by: SURGERY

## 2018-03-04 PROCEDURE — 25000128 H RX IP 250 OP 636: Performed by: SURGERY

## 2018-03-04 PROCEDURE — 36000058 ZZH SURGERY LEVEL 3 EA 15 ADDTL MIN: Performed by: SURGERY

## 2018-03-04 PROCEDURE — P9041 ALBUMIN (HUMAN),5%, 50ML: HCPCS | Performed by: NURSE ANESTHETIST, CERTIFIED REGISTERED

## 2018-03-04 PROCEDURE — 71000012 ZZH RECOVERY PHASE 1 LEVEL 1 FIRST HR: Performed by: SURGERY

## 2018-03-04 RX ORDER — SODIUM CHLORIDE, SODIUM LACTATE, POTASSIUM CHLORIDE, CALCIUM CHLORIDE 600; 310; 30; 20 MG/100ML; MG/100ML; MG/100ML; MG/100ML
INJECTION, SOLUTION INTRAVENOUS CONTINUOUS
Status: DISCONTINUED | OUTPATIENT
Start: 2018-03-04 | End: 2018-03-04 | Stop reason: HOSPADM

## 2018-03-04 RX ORDER — HYDROMORPHONE HYDROCHLORIDE 1 MG/ML
.3-.5 INJECTION, SOLUTION INTRAMUSCULAR; INTRAVENOUS; SUBCUTANEOUS EVERY 5 MIN PRN
Status: DISCONTINUED | OUTPATIENT
Start: 2018-03-04 | End: 2018-03-04 | Stop reason: HOSPADM

## 2018-03-04 RX ORDER — ALBUTEROL SULFATE 0.83 MG/ML
2.5 SOLUTION RESPIRATORY (INHALATION) EVERY 4 HOURS PRN
Status: DISCONTINUED | OUTPATIENT
Start: 2018-03-04 | End: 2018-03-04 | Stop reason: HOSPADM

## 2018-03-04 RX ORDER — NALOXONE HYDROCHLORIDE 0.4 MG/ML
.1-.4 INJECTION, SOLUTION INTRAMUSCULAR; INTRAVENOUS; SUBCUTANEOUS
Status: DISCONTINUED | OUTPATIENT
Start: 2018-03-04 | End: 2018-03-04

## 2018-03-04 RX ORDER — HYDRALAZINE HYDROCHLORIDE 20 MG/ML
2.5-5 INJECTION INTRAMUSCULAR; INTRAVENOUS EVERY 10 MIN PRN
Status: DISCONTINUED | OUTPATIENT
Start: 2018-03-04 | End: 2018-03-04 | Stop reason: HOSPADM

## 2018-03-04 RX ORDER — ESMOLOL HYDROCHLORIDE 10 MG/ML
INJECTION INTRAVENOUS PRN
Status: DISCONTINUED | OUTPATIENT
Start: 2018-03-04 | End: 2018-03-04

## 2018-03-04 RX ORDER — HYDROMORPHONE HYDROCHLORIDE 1 MG/ML
0.2 INJECTION, SOLUTION INTRAMUSCULAR; INTRAVENOUS; SUBCUTANEOUS
Status: DISCONTINUED | OUTPATIENT
Start: 2018-03-04 | End: 2018-03-26

## 2018-03-04 RX ORDER — FENTANYL CITRATE 50 UG/ML
INJECTION, SOLUTION INTRAMUSCULAR; INTRAVENOUS PRN
Status: DISCONTINUED | OUTPATIENT
Start: 2018-03-04 | End: 2018-03-04

## 2018-03-04 RX ORDER — ONDANSETRON 2 MG/ML
INJECTION INTRAMUSCULAR; INTRAVENOUS PRN
Status: DISCONTINUED | OUTPATIENT
Start: 2018-03-04 | End: 2018-03-04

## 2018-03-04 RX ORDER — NEOSTIGMINE METHYLSULFATE 1 MG/ML
VIAL (ML) INJECTION PRN
Status: DISCONTINUED | OUTPATIENT
Start: 2018-03-04 | End: 2018-03-04

## 2018-03-04 RX ORDER — LIDOCAINE HYDROCHLORIDE 20 MG/ML
INJECTION, SOLUTION INFILTRATION; PERINEURAL PRN
Status: DISCONTINUED | OUTPATIENT
Start: 2018-03-04 | End: 2018-03-04

## 2018-03-04 RX ORDER — LABETALOL HYDROCHLORIDE 5 MG/ML
10 INJECTION, SOLUTION INTRAVENOUS
Status: DISCONTINUED | OUTPATIENT
Start: 2018-03-04 | End: 2018-03-04 | Stop reason: HOSPADM

## 2018-03-04 RX ORDER — PROPOFOL 10 MG/ML
INJECTION, EMULSION INTRAVENOUS PRN
Status: DISCONTINUED | OUTPATIENT
Start: 2018-03-04 | End: 2018-03-04

## 2018-03-04 RX ORDER — ONDANSETRON 4 MG/1
4 TABLET, ORALLY DISINTEGRATING ORAL EVERY 30 MIN PRN
Status: DISCONTINUED | OUTPATIENT
Start: 2018-03-04 | End: 2018-03-04 | Stop reason: HOSPADM

## 2018-03-04 RX ORDER — GLYCOPYRROLATE 0.2 MG/ML
INJECTION, SOLUTION INTRAMUSCULAR; INTRAVENOUS PRN
Status: DISCONTINUED | OUTPATIENT
Start: 2018-03-04 | End: 2018-03-04

## 2018-03-04 RX ORDER — MAGNESIUM HYDROXIDE 1200 MG/15ML
LIQUID ORAL PRN
Status: DISCONTINUED | OUTPATIENT
Start: 2018-03-04 | End: 2018-03-04 | Stop reason: HOSPADM

## 2018-03-04 RX ORDER — OXYCODONE HYDROCHLORIDE 5 MG/1
5 TABLET ORAL EVERY 4 HOURS PRN
Status: DISCONTINUED | OUTPATIENT
Start: 2018-03-04 | End: 2018-03-11

## 2018-03-04 RX ORDER — MEPERIDINE HYDROCHLORIDE 25 MG/ML
12.5 INJECTION INTRAMUSCULAR; INTRAVENOUS; SUBCUTANEOUS EVERY 5 MIN PRN
Status: DISCONTINUED | OUTPATIENT
Start: 2018-03-04 | End: 2018-03-04 | Stop reason: HOSPADM

## 2018-03-04 RX ORDER — ONDANSETRON 2 MG/ML
4 INJECTION INTRAMUSCULAR; INTRAVENOUS EVERY 30 MIN PRN
Status: DISCONTINUED | OUTPATIENT
Start: 2018-03-04 | End: 2018-03-04 | Stop reason: HOSPADM

## 2018-03-04 RX ORDER — FENTANYL CITRATE 50 UG/ML
25-50 INJECTION, SOLUTION INTRAMUSCULAR; INTRAVENOUS
Status: DISCONTINUED | OUTPATIENT
Start: 2018-03-04 | End: 2018-03-04 | Stop reason: HOSPADM

## 2018-03-04 RX ORDER — CALCIUM CHLORIDE 100 MG/ML
INJECTION INTRAVENOUS; INTRAVENTRICULAR PRN
Status: DISCONTINUED | OUTPATIENT
Start: 2018-03-04 | End: 2018-03-04

## 2018-03-04 RX ORDER — ALBUMIN, HUMAN INJ 5% 5 %
SOLUTION INTRAVENOUS CONTINUOUS PRN
Status: DISCONTINUED | OUTPATIENT
Start: 2018-03-04 | End: 2018-03-04

## 2018-03-04 RX ADMIN — PROPOFOL 80 MG: 10 INJECTION, EMULSION INTRAVENOUS at 09:48

## 2018-03-04 RX ADMIN — Medication 12.5 MG: at 21:44

## 2018-03-04 RX ADMIN — PHENYLEPHRINE HYDROCHLORIDE 100 MCG: 10 INJECTION INTRAVENOUS at 11:00

## 2018-03-04 RX ADMIN — ROCURONIUM BROMIDE 50 MG: 10 INJECTION INTRAVENOUS at 09:55

## 2018-03-04 RX ADMIN — FENTANYL CITRATE 50 MCG: 50 INJECTION, SOLUTION INTRAMUSCULAR; INTRAVENOUS at 10:13

## 2018-03-04 RX ADMIN — ESMOLOL HYDROCHLORIDE 30 MG: 10 INJECTION, SOLUTION INTRAVENOUS at 09:36

## 2018-03-04 RX ADMIN — FENTANYL CITRATE 50 MCG: 50 INJECTION, SOLUTION INTRAMUSCULAR; INTRAVENOUS at 10:27

## 2018-03-04 RX ADMIN — ESMOLOL HYDROCHLORIDE 30 MG: 10 INJECTION, SOLUTION INTRAVENOUS at 09:46

## 2018-03-04 RX ADMIN — PHENYLEPHRINE HYDROCHLORIDE 300 MCG: 10 INJECTION INTRAVENOUS at 09:54

## 2018-03-04 RX ADMIN — ALBUMIN (HUMAN): 12.5 SOLUTION INTRAVENOUS at 09:49

## 2018-03-04 RX ADMIN — NEOSTIGMINE METHYLSULFATE 3.5 MG: 1 INJECTION INTRAMUSCULAR; INTRAVENOUS; SUBCUTANEOUS at 11:15

## 2018-03-04 RX ADMIN — CALCIUM CHLORIDE 1 G: 100 INJECTION, SOLUTION INTRAVENOUS at 09:55

## 2018-03-04 RX ADMIN — SODIUM CHLORIDE, POTASSIUM CHLORIDE, SODIUM LACTATE AND CALCIUM CHLORIDE: 600; 310; 30; 20 INJECTION, SOLUTION INTRAVENOUS at 08:43

## 2018-03-04 RX ADMIN — SODIUM CHLORIDE, POTASSIUM CHLORIDE, SODIUM LACTATE AND CALCIUM CHLORIDE: 600; 310; 30; 20 INJECTION, SOLUTION INTRAVENOUS at 11:27

## 2018-03-04 RX ADMIN — SODIUM CHLORIDE 500 ML: 9 INJECTION, SOLUTION INTRAVENOUS at 22:28

## 2018-03-04 RX ADMIN — ACETAMINOPHEN 1000 MG: 10 INJECTION, SOLUTION INTRAVENOUS at 20:03

## 2018-03-04 RX ADMIN — HYDROMORPHONE HYDROCHLORIDE 0.25 MG: 1 INJECTION, SOLUTION INTRAMUSCULAR; INTRAVENOUS; SUBCUTANEOUS at 11:33

## 2018-03-04 RX ADMIN — POTASSIUM CHLORIDE, DEXTROSE MONOHYDRATE AND SODIUM CHLORIDE: 150; 5; 900 INJECTION, SOLUTION INTRAVENOUS at 14:50

## 2018-03-04 RX ADMIN — FENTANYL CITRATE 25 MCG: 50 INJECTION, SOLUTION INTRAMUSCULAR; INTRAVENOUS at 11:19

## 2018-03-04 RX ADMIN — PHENYLEPHRINE HYDROCHLORIDE 0.4 MCG/KG/MIN: 10 INJECTION, SOLUTION INTRAMUSCULAR; INTRAVENOUS; SUBCUTANEOUS at 09:51

## 2018-03-04 RX ADMIN — POTASSIUM CHLORIDE, DEXTROSE MONOHYDRATE AND SODIUM CHLORIDE: 150; 5; 900 INJECTION, SOLUTION INTRAVENOUS at 07:10

## 2018-03-04 RX ADMIN — ALBUMIN (HUMAN): 12.5 SOLUTION INTRAVENOUS at 09:39

## 2018-03-04 RX ADMIN — FENTANYL CITRATE 50 MCG: 50 INJECTION, SOLUTION INTRAMUSCULAR; INTRAVENOUS at 09:48

## 2018-03-04 RX ADMIN — FENTANYL CITRATE 25 MCG: 50 INJECTION, SOLUTION INTRAMUSCULAR; INTRAVENOUS at 10:52

## 2018-03-04 RX ADMIN — HYDROMORPHONE HYDROCHLORIDE 0.25 MG: 1 INJECTION, SOLUTION INTRAMUSCULAR; INTRAVENOUS; SUBCUTANEOUS at 11:28

## 2018-03-04 RX ADMIN — WATER 1 G: 1 INJECTION INTRAMUSCULAR; INTRAVENOUS; SUBCUTANEOUS at 09:50

## 2018-03-04 RX ADMIN — ONDANSETRON 4 MG: 2 INJECTION INTRAMUSCULAR; INTRAVENOUS at 11:15

## 2018-03-04 RX ADMIN — SUCCINYLCHOLINE CHLORIDE 80 MG: 20 INJECTION, SOLUTION INTRAMUSCULAR; INTRAVENOUS; PARENTERAL at 09:48

## 2018-03-04 RX ADMIN — GLYCOPYRROLATE 0.5 MG: 0.2 INJECTION, SOLUTION INTRAMUSCULAR; INTRAVENOUS at 11:15

## 2018-03-04 RX ADMIN — LIDOCAINE HYDROCHLORIDE 80 MG: 20 INJECTION, SOLUTION INFILTRATION; PERINEURAL at 09:48

## 2018-03-04 NOTE — PROGRESS NOTES
"[facility]    Internal Medicine Hospitalist Progress Note  03/04/2018  I evaluated patient on the above date.    Juventino Lauren Jr., MD  [phone] (p)  Text Page (7 am to 6 pm)      Assessment & Plan   Mr. Joseluis Falcon is an 85-year-old male with a past medical history significant for psoriasis and bilateral inguinal herniorrhaphy, who presented 2/27 with complaints of pain in abdomen and vomiting and noted with a small bowel obstruction.     1. SBO - s/p laparoscopic TISHA and lap-assisted SB resection 3/4/2018.  Hx of bilateral inguinal herniorrhaphy. CT 2/27 showed proximal SBO with abnormal wall thickening of the mid SB loops. NG placed 3/1. Challenged with Gastrografin 3/3 and axr showed some contrast in proximal R colon and AFL, with some suggestion of adynamic ileus rather than obstruction. X-rays 3/4 showed increasing SB distension. Taken to surgery 3/4/2018.  - Post-op management per Surgery.  - Continue NPO and IVF's.  - Continue PRN IV acetaminophen, PRN IV Dilaudid (minimize opioids as able).  - Ambulate.  - Appreciate help from Surgery.    2. Acute kidney injury, stage 1, suspect prerenal.  Cr up to 1.30 on 3/1.   Recent Labs  Lab 03/02/18  0810 03/01/18  0736 02/28/18  0743 02/27/18  1219   CR 1.25 1.30* 1.09 1.21   - Continue IVF's.  - Monitor BMP.  - Avoid nephrotoxic medications.    3. Acute delirium.  Confused and agitated requiring Haldol, Zyprexa at times.  - Continue Seroquel 12.5 mg qHS as able.  - Continue PRN Zyprexa and PRN Haldol.    Prophylaxis.  - PCD's, ambulation.    CODE STATUS: FULL.    Dispo.  - Pending above.    Interval History   Seen in PACU.  Pain controlled.    -Data reviewed today: I reviewed all new labs and imaging over the last 24 hours. I personally reviewed no images or EKG's today.    Physical Exam   Heart Rate: 96, Blood pressure 150/80, pulse 84, temperature 98.8  F (37.1  C), resp. rate 27, height 1.753 m (5' 9\"), weight 74.8 kg (164 lb 14.5 oz), SpO2 97 " %.  Vitals:    02/27/18 1212 02/28/18 0700   Weight: 74.8 kg (165 lb) 74.8 kg (164 lb 14.5 oz)     Vital Signs with Ranges  Temp:  [97.5  F (36.4  C)-98.8  F (37.1  C)] 98.8  F (37.1  C)  Pulse:  [84] 84  Heart Rate:  [63-98] 96  Resp:  [16-27] 27  BP: (107-150)/(68-97) 150/80  SpO2:  [68 %-100 %] 97 %  Patient Vitals for the past 24 hrs:   BP Temp Temp src Pulse Heart Rate Resp SpO2   03/04/18 1200 150/80 98.8  F (37.1  C) - - 96 27 97 %   03/04/18 1150 (!) 126/97 98.8  F (37.1  C) - - 87 25 100 %   03/04/18 1140 142/83 98.8  F (37.1  C) - - 96 25 (!) 68 %   03/04/18 1130 142/83 98.7  F (37.1  C) Temporal - 95 16 -   03/04/18 0732 107/69 98.4  F (36.9  C) Oral 84 78 16 94 %   03/04/18 0158 116/68 97.5  F (36.4  C) Oral - 63 16 94 %   03/03/18 1521 118/80 98.6  F (37  C) Axillary - 98 16 95 %     I/O's Last 24 hours  I/O last 3 completed shifts:  In: 1664 [I.V.:1604; NG/GT:60]  Out: 1350 [Urine:150; Emesis/NG output:1200]    Constitutional: Alert, pleasant, confused.  Respiratory: Diminished in bases. No crackles or wheezes.  Cardiovascular: RRR no m/r/g.  GI: Soft, few BS, no r/g tenderness to light touch.  Skin/Integumen:   Other:        Data     Recent Labs  Lab 03/04/18  0800 03/02/18  0810 03/01/18  0736 02/28/18  0743 02/27/18  1219   WBC  --   --   --  5.0 12.6*   HGB 14.1  --   --  14.1 15.4   MCV  --   --   --  99 98   PLT  --   --   --  133* 154   INR  --   --   --   --  1.02   NA  --  143 139 139 139   POTASSIUM  --  4.6 4.4 4.3 3.6   CHLORIDE  --  111* 105 106 100   CO2  --  26 31 28 31   BUN  --  36* 30 27 25   CR  --  1.25 1.30* 1.09 1.21   ANIONGAP  --  6 3 5 8   TOBI  --  8.4* 8.3* 8.5 8.9   GLC  --  166* 127* 130* 168*   ALBUMIN  --   --   --   --  3.7   PROTTOTAL  --   --   --   --  7.1   BILITOTAL  --   --   --   --  1.3   ALKPHOS  --   --   --   --  97   ALT  --   --   --   --  16   AST  --   --   --   --  13   LIPASE  --   --   --   --  92   TROPI  --   --   --   --  <0.015     Recent C-nario   Lab  Test  03/02/18   0810  03/01/18   0736  02/28/18   0743  02/27/18   1219 01/09/17   GLC  166*  127*  130*  168*  101*         Recent Results (from the past 24 hour(s))   XR Abdomen 2 Views    Narrative    ABDOMEN TWO VIEWS  3/4/2018 7:03 AM     HISTORY: Follow-up small bowel obstruction.     COMPARISON: Abdominal x-ray 3/3/2018.      Impression    IMPRESSION: There is increasing air distention of loops of small bowel  in the mid to upper abdomen. Distal small bowel is difficult to  visualize. Previously noted Gastroview has dissipated and no longer  identified. Increasing air distention of the bowel could suggest early  changes of bowel obstruction.    EDI NELSON MD       Medications   All medications were reviewed.    lactated ringers       dextrose 5% and 0.9% NaCl with potassium chloride 20 mEq 75 mL/hr at 03/04/18 0740       [Auto Hold] QUEtiapine  12.5 mg Oral At Bedtime     [Auto Hold] OLANZapine  5 mg Intramuscular Once

## 2018-03-04 NOTE — ANESTHESIA CARE TRANSFER NOTE
Patient: Joseluis Falcon    Procedure(s):  EXPLORATORY LAPAROSCOPY LINDA LYSIS OF ADHESION; SMALL BOWEL RESECTION - Wound Class: II-Clean Contaminated   - Wound Class: II-Clean Contaminated   - Wound Class: II-Clean Contaminated    Diagnosis: unknown  Diagnosis Additional Information: No value filed.    Anesthesia Type:   General     Note:  Airway :Face Mask  Patient transferred to:PACU  Comments: Transferred to PACU, spontaneous respirations, 10L oxygen via facemask.  All monitors and alarms on and functioning, VSS.  Patient awake, appears comfortable but still c/o mid discomfort.  Report to PACU RN.  HR 97 -100 upon arrival to PACU.  Rhythm appears sinus.Handoff Report: Identifed the Patient, Identified the Reponsible Provider, Reviewed the pertinent medical history, Discussed the surgical course, Reviewed Intra-OP anesthesia mangement and issues during anesthesia, Set expectations for post-procedure period and Allowed opportunity for questions and acknowledgement of understanding      Vitals: (Last set prior to Anesthesia Care Transfer)    CRNA VITALS  3/4/2018 1101 - 3/4/2018 1141      3/4/2018             Ht Rate: 161    Resp Rate (set): 10                Electronically Signed By: JASMINA Krueger CRNA  March 4, 2018  11:41 AM

## 2018-03-04 NOTE — OP NOTE
General Surgery Operative Note    PREOPERATIVE DIAGNOSIS: Small bowel obstruction    POSTOPERATIVE DIAGNOSIS: Small bowel obstruction    PROCEDURE:  1 Exploratory laparoscopy       2-Laparoscopic lysis of adhesions       3-Laparoscopic assisted small bowel resection    SURGEON:  Robel Gomez M.D.    ASSISTANT:  Bryan Griffin PA-C    ANESTHESIA:  General.    BLOOD LOSS: 10 cc    FINDINGS: Small bowel obstruction due to adhesive band near the terminal ileum with pressure necrosis and narrowing of that segment of small bowel    INDICATIONS:   Mr. Falcon presented with small bowel obstruction.  He was followed conservatively but has not made significant progress.  He developed increased distention and pain today.  He is now presenting for exploratory laparoscopy and possible bowel resection. I explained the risks, benefits, complications including but not limited to bleeding, infection, possible need to open, possible postop hematoma, seroma, bowel, bladder or an anastomotic dehiscence, MI, PE, and if any of these occurred the patient would require additional procedures. The patient agreed and did sign consent.    DETAILS OF PROCEDURE: The patient was brought to the operating room per Anesthesia, placed in supine position, and intubated without difficulty.  He then developed atrial fibrillation with a rapid rate and was cardioverted by anesthesia.  He then went into a sinus rhythm and his blood pressure stabilized.  He was then prepped and draped in the usual sterile fashion with ChloraPrep.  A Surgical timeout was then performed, verifying the correct surgeon, site, procedure, and patient and all in the room were in agreement.  A small incision was made in the left upper quadrant.  An Optiview trocar technique was used to gain entry without difficulty.  The camera was inserted and the pressure of the abdomen was insufflated to a pressure of 15 which the patient tolerated well.  There appeared to be multiple very  dilated loops of small bowel.  2 additional fives were placed under direct visualization.  The bowel was ran from the ligament of Treitz downward.  This bowel was very dilated but pink without signs of ischemia.  As I progressed distally there was a area wrapped around a portion of his omentum.  I reduce this with cautery dissection.  This freed the band.  I then continued more distally and once I got near the terminal ileum, I located the area where the adhesive band was causing a small bowel obstruction.  There was a clear transition point.  Once the area was freed I mobilized the terminal ileum and cecum with cautery to expose this loop of bowel better.  There was an obvious area where a very tight band was causing the obstruction.  I meticulously inspected the area and it appeared that there was obvious ischemic necrosis and narrowing due to the chronic obstruction.  Once this was determined I elected to resect this small segment.  A small incision was made under the umbilicus and carried through the subcutaneous tissue with cautery.  Entry was obtained and a small wound retractor was applied.  The bowel loop was brought out and carefully inspected showing obvious ischemic necrosis due to the band.  2 small enterotomies were made and a ROSINA 80 stapler was fired creating a common limb.  I inspected the inside of the limb and it showed no bleeding.  The common limb was sealed with an additional ROSINA 80.  This created a functional end-to-end side-to-side anastomosis.  The lumen was pink and large in nature.  Multiple silk sutures were used to decrease the tension at the crotch of the anastomosis and to stop bleeding along the staple line.  The area was pink without tension.  All dirty instruments were exchanged and new gloves were applied.  The bowel was then placed intra-abdominally and the fascia was closed with a running 0 PDS starting cephalad and caudad.  The wound came together very nicely.  The camera was  reinserted showing no bleeding.  The anastomosis was lying flat without any tension.  All trochars were then taken out under direct visualization.  Marcaine 0.25% were injected to all the wounds. The skin was irrigated and closed with a 4-0 Monocryl in a subcuticular fashion. Steri-strips and sterile dressings were applied. The patient tolerated the procedure well. There were no complications. They were awoken from anesthesia and transferred to PACU in stable condition. All sponge, instrument and needle counts were correct. The physician assistant was medically necessary to assist in prepping, positioning, camera operation, retraction/exposure, and closure of the port sites.     GUY WALL MD     Please route or send letter to:  Primary Care Provider (PCP) and Referring Provider

## 2018-03-04 NOTE — ANESTHESIA PREPROCEDURE EVALUATION
Anesthesia Evaluation     . Pt has had prior anesthetic.     No history of anesthetic complications          ROS/MED HX    ENT/Pulmonary:      (-) sleep apnea   Neurologic:     (+)delerium     Cardiovascular:     (+) ----. : . . . :. . Previous cardiac testing date:results:date: results:ECG reviewed date:2/27/18 results:ST elevation but negative trops. Likely demand ischemia on admission date: results:          METS/Exercise Tolerance:     Hematologic:         Musculoskeletal:   (+) arthritis, , , -       GI/Hepatic:     (+) GERD Other GI/Hepatic SBO      Renal/Genitourinary:         Endo:         Psychiatric:         Infectious Disease:         Malignancy:   (+) Malignancy History of Skin          Other:                                    Anesthesia Plan      History & Physical Review  History and physical reviewed and following examination; no interval change.    ASA Status:  3 .    NPO Status:  > 8 hours    Plan for General, RSI and ETT with Intravenous induction. Maintenance will be Balanced.    PONV prophylaxis:  Ondansetron (or other 5HT-3) and Dexamethasone or Solumedrol       Postoperative Care  Postoperative pain management:  IV analgesics and Oral pain medications.      Consents  Anesthetic plan, risks, benefits and alternatives discussed with:  Patient..                          .  Procedure: Procedure(s):  LAPAROSCOPY DIAGNOSTIC (GENERAL)  Preop diagnosis: unknown    Allergies   Allergen Reactions     Seasonal Allergies      Protonix [Pantoprazole] Rash     Past Medical History:   Diagnosis Date     Arthritis      Carlson's esophagus      GERD (gastroesophageal reflux disease)      Hard of hearing      Psoriasis      Skin cancer      Past Surgical History:   Procedure Laterality Date     HERNIORRHAPHY INGUINAL Right 12/1/2015    Procedure: HERNIORRHAPHY INGUINAL;  Surgeon: Malcom Rasmussen MD;  Location: [facility]     HERNIORRHAPHY INGUINAL Left 6/9/2017    Procedure: HERNIORRHAPHY INGUINAL;  OPEN  LEFT INGUINAL HERNIA REPAIR WITH MESH ;  Surgeon: Aden Haro MD;  Location:  OR     Social History   Substance Use Topics     Smoking status: Former Smoker     Quit date: 2/3/1964     Smokeless tobacco: Never Used     Alcohol use Yes      Comment: 1 drink daily     Prior to Admission medications    Medication Sig Start Date End Date Taking? Authorizing Provider   IBUPROFEN PO Take 600 mg by mouth every 8 hours as needed    Yes Reported, Patient   mometasone (ELOCON) 0.1 % cream Apply once a day to affected area, ok to use on face 1/11/18  Yes Reported, Patient     Current Facility-Administered Medications Ordered in Epic   Medication Dose Route Frequency Last Rate Last Dose     lactated ringers infusion   Intravenous Continuous 25 mL/hr at 03/04/18 0843       [Auto Hold] acetaminophen (OFIRMEV) infusion 1,000 mg  1,000 mg Intravenous Q6H  mL/hr at 03/02/18 1126 1,000 mg at 03/02/18 1126     [Auto Hold] QUEtiapine (SEROquel) half-tab 12.5 mg  12.5 mg Oral At Bedtime   12.5 mg at 03/03/18 2234     [Auto Hold] naloxone (NARCAN) injection 0.1-0.4 mg  0.1-0.4 mg Intravenous Q2 Min PRN         [Auto Hold] melatonin tablet 1 mg  1 mg Oral At Bedtime PRN         dextrose 5% and 0.9% NaCl with potassium chloride 20 mEq infusion   Intravenous Continuous 75 mL/hr at 03/04/18 0740       [Auto Hold] acetaminophen (TYLENOL) tablet 650 mg  650 mg Oral Q4H PRN   650 mg at 02/28/18 0304     [Auto Hold] HYDROmorphone (PF) (DILAUDID) injection 0.2 mg  0.2 mg Intravenous Q2H PRN   0.2 mg at 03/02/18 0356     [Auto Hold] ondansetron (ZOFRAN-ODT) ODT tab 4 mg  4 mg Oral Q6H PRN        Or     [Auto Hold] ondansetron (ZOFRAN) injection 4 mg  4 mg Intravenous Q6H PRN   4 mg at 02/28/18 0325     [Auto Hold] prochlorperazine (COMPAZINE) injection 5 mg  5 mg Intravenous Q6H PRN   5 mg at 02/28/18 0357    Or     [Auto Hold] prochlorperazine (COMPAZINE) tablet 5 mg  5 mg Oral Q6H PRN        Or     [Auto Hold]  prochlorperazine (COMPAZINE) Suppository 12.5 mg  12.5 mg Rectal Q12H PRN         [Auto Hold] haloperidol lactate (HALDOL) injection 2 mg  2 mg Intravenous Q6H PRN   2 mg at 03/02/18 0130     [Auto Hold] OLANZapine (zyPREXA) injection 5 mg  5 mg Intramuscular Once         No current Epic-ordered outpatient prescriptions on file.       lactated ringers 25 mL/hr at 03/04/18 0843     dextrose 5% and 0.9% NaCl with potassium chloride 20 mEq 75 mL/hr at 03/04/18 0740     Wt Readings from Last 1 Encounters:   02/28/18 74.8 kg (164 lb 14.5 oz)     Temp Readings from Last 1 Encounters:   03/04/18 36.9  C (98.4  F) (Oral)     BP Readings from Last 6 Encounters:   03/04/18 107/69   06/09/17 99/62   05/25/17 119/62   12/01/15 106/44   11/23/15 140/83   10/31/14 106/70     Pulse Readings from Last 4 Encounters:   03/04/18 84   05/25/17 65   11/23/15 69   10/31/14 56     Resp Readings from Last 1 Encounters:   03/04/18 16     SpO2 Readings from Last 1 Encounters:   03/04/18 94%     Recent Labs   Lab Test  03/02/18   0810  03/01/18   0736   NA  143  139   POTASSIUM  4.6  4.4   CHLORIDE  111*  105   CO2  26  31   ANIONGAP  6  3   GLC  166*  127*   BUN  36*  30   CR  1.25  1.30*   TOBI  8.4*  8.3*     Recent Labs   Lab Test  02/27/18   1219 01/09/17   AST  13  18   ALT  16  9   ALKPHOS  97   --    BILITOTAL  1.3   --    LIPASE  92   --      Recent Labs   Lab Test  03/04/18   0800  02/28/18   0743  02/27/18   1219   WBC   --   5.0  12.6*   HGB  14.1  14.1  15.4   PLT   --   133*  154     Recent Labs   Lab Test  03/04/18   0800  02/27/18   1219   ABO  PENDING  A   RH   --   Pos     Recent Labs   Lab Test  02/27/18   1219 11/30/15   INR  1.02  1.0      Recent Labs   Lab Test  02/27/18   1219   TROPI  <0.015     No results for input(s): PH, PCO2, PO2, HCO3 in the last 42318 hours.  No results for input(s): HCG in the last 88050 hours.  Recent Results (from the past 744 hour(s))   CT Abdomen Pelvis w Contrast    Narrative    CT  ABDOMEN/PELVIS WITH CONTRAST February 27, 2018 1:30 PM     HISTORY: Abdominal pain, left sided, concern for diverticulitis.    TECHNIQUE: 83 mL Isovue 370 IV. Radiation dose for this scan was  reduced using automated exposure control, adjustment of the mA and/or  kV according to patient size, or iterative reconstruction technique.    COMPARISON: None.    FINDINGS: Sigmoid diverticula. No evidence of diverticulitis. There  are dilated loops of proximal small bowel and decompressed distal  loops. There are mid small bowel loops which have nonspecific wall  thickening; this could relate to inflammation, ischemia, or infection.  This could be the cause of the small bowel obstruction. Prostate  enlargement. Small amount of free fluid in the lower pelvis.  Unremarkable appendix. Emphysematous changes. Multiple chronic  appearing spinal compression deformities. Nothing else acute is seen  in the upper abdominal organs.       Impression    IMPRESSION:  1. Proximal small bowel obstruction. This may well be related to  abnormal wall thickening of the mid small bowel loops, as above.  2. Additional findings discussed above.     ASTON LUCIANO MD   XR Abdomen 2 Views    Narrative    ABDOMEN TWO VIEWS 3/3/2018 6:15 AM     HISTORY: Recheck small bowel obstruction after Gastroview challenge.     COMPARISON: CT abdomen and pelvis 2/27/2018.      Impression    IMPRESSION: Two views of the abdomen are performed. Nasogastric tube  terminates below the left hemidiaphragm presumably in the stomach.  Oral contrast is visualized and extends through multiple loops of  mildly dilated small bowel. Scattered air-fluid levels are present on  the upright view. Follow-up imaging to assess for progression of oral  contrast as needed. Suggest follow-up imaging in 3 hours from the time  of this exam.    EDI NELSON MD   XR Abdomen 2 Views    Narrative    ABDOMEN TWO VIEWS 3/3/2018 10:29 AM     HISTORY: Recheck abdominal x-ray after Gastrografin  challenge.      COMPARISON: Abdominal x-ray 3/3/2018 at 0607 hours.      Impression    IMPRESSION: Gastroview contrast is becoming less dense over time but  there appears to be some contrast in the proximal right colon in the  lateral x-ray. Air-fluid levels are again noted on the upright view  suggesting adynamic ileus rather than obstruction. Fibrotic right lung  base changes are noted. Nasogastric tube terminates below the left  hemidiaphragm.    EDI NELSON MD   XR Abdomen 2 Views    Narrative    ABDOMEN TWO VIEWS  3/4/2018 7:03 AM     HISTORY: Follow-up small bowel obstruction.     COMPARISON: Abdominal x-ray 3/3/2018.      Impression    IMPRESSION: There is increasing air distention of loops of small bowel  in the mid to upper abdomen. Distal small bowel is difficult to  visualize. Previously noted Gastroview has dissipated and no longer  identified. Increasing air distention of the bowel could suggest early  changes of bowel obstruction.    EDI NELSON MD       RECENT LABS:   ECG:   ECHO:

## 2018-03-04 NOTE — PROGRESS NOTES
"Surgery Progress Note    Feels worse today.  No improvement.  No bowel movement or gas.  Continues to feel bloated and has developed some pain today.    /69 (BP Location: Right arm)  Pulse 84  Temp 98.4  F (36.9  C) (Oral)  Resp 16  Ht 5' 9\" (1.753 m)  Wt 164 lb 14.5 oz (74.8 kg)  SpO2 94%  BMI 24.35 kg/m2  General- Alert but mildly confused  Abdomen- soft but more distended.  Increased tenderness today compared to yesterday.  No rebound or guarding..     A/P-   Patient has not improved.  His abdominal x-ray shows more air and he has developed more tenderness today.  I would recommend going forward with diagnostic laparoscopy, possible laparotomy and bowel resection.  The patient is in agreement.   I discussed the risks, benefits, complications including but not limited to bleeding, infection, possible injury to the bowel or ureter, possible anastomotic dehiscence, possible postoperative MI, PE, or other anesthetic complications. If one of these complications did arise the patient may require additional procedures. Consent was obtained from his daughter who is the power of .    Robel Gomez M.D.  [facility]  [phone]  "

## 2018-03-04 NOTE — PLAN OF CARE
Problem: Patient Care Overview  Goal: Plan of Care/Patient Progress Review  OT: Pt off floor for surgical procedure, will reschedule.

## 2018-03-04 NOTE — BRIEF OP NOTE
[facility] Brief Operative Note    Pre-operative diagnosis: Small bowel obstruction   Post-operative diagnosis Small bowel obstruction  Umbilical hernia   Procedure: Procedure(s):  EXPLORATORY LAPAROSCOPY LINDA LYSIS OF ADHESION; SMALL BOWEL RESECTION and primary repair of small umbilical hernia - Wound Class: II-Clean Contaminated   - Wound Class: II-Clean Contaminated   - Wound Class: II-Clean Contaminated   Surgeon(s): Surgeon(s) and Role:     * Robel Gomez MD - Primary     * Bryan Griffin PA-C - Assisting   Estimated blood loss: 10cc   Specimens:   ID Type Source Tests Collected by Time Destination   A : SMALL BOWEL Tissue Other SURGICAL PATHOLOGY EXAM Robel Gomez MD 3/4/2018 11:08 AM       Findings: Omental adhesions  Transition point ~10cm proximal to TI.   Short segmental SBR  No complications    Bryan Griffin PA-C  Office: [phone]  Pager: [phone]

## 2018-03-04 NOTE — PROGRESS NOTES
Pt restless and confused.  RN's unable to reorient and calm patient, bilateral hand mitts applied.  Pt tolerating well.  Station [address] aware of possible sitter at bedside.

## 2018-03-04 NOTE — PLAN OF CARE
Problem: Patient Care Overview  Goal: Plan of Care/Patient Progress Review  Outcome: No Change  Care Plan Summary Note: 0800: vss, alert to self only, denied pain/nausea.vomiting. NG clamped since last evening at 2200. Marked at 60cm. Pt pleasant, calm and cooperative. PIV patent and SL. BS hypoactive, lungs clear, on RA. SBA x1. Continent of bladder, not passing stool. AXR result back from 0600. Planned to have laparoscopy surgery at 1000. Report given to pre-op at 0810. Daughter updated over the phone. Pt aware of plan. Pt had been NPO. Transferred to pre-op via stretcher in stable condition at 0830. Belongings sent with pt to pre-op.

## 2018-03-04 NOTE — PLAN OF CARE
Per OR, patient has exploratory laparoscopy scheduled for 9:30am or 10am tomorrow morning.  Will update daughter.

## 2018-03-04 NOTE — ANESTHESIA POSTPROCEDURE EVALUATION
Patient: Joseluis Falcon    Procedure(s):  EXPLORATORY LAPAROSCOPY LINDA LYSIS OF ADHESION; SMALL BOWEL RESECTION - Wound Class: II-Clean Contaminated   - Wound Class: II-Clean Contaminated   - Wound Class: II-Clean Contaminated    Diagnosis:unknown  Diagnosis Additional Information: No value filed.    Anesthesia Type:  General    Note:  Anesthesia Post Evaluation    Patient location during evaluation: PACU  Patient participation: Able to fully participate in evaluation  Level of consciousness: awake  Pain management: adequate  Airway patency: patent  Cardiovascular status: acceptable  Respiratory status: acceptable  Hydration status: acceptable  PONV: none     Anesthetic complications: None          Last vitals:  Vitals:    03/04/18 1240 03/04/18 1318 03/04/18 1347   BP: 143/84  119/70   Pulse:      Resp: 25  18   Temp: 37.2  C (99  F)     SpO2: 94% 94% 98%         Electronically Signed By: Rosalba Treadwell MD, MD  March 4, 2018  1:54 PM

## 2018-03-04 NOTE — PLAN OF CARE
Problem: Patient Care Overview  Goal: Plan of Care/Patient Progress Review  Outcome: No Change  Oriented to person.  SBA.  NPO with ice chips.  Running IVF.  VSS on RA.  Denies pain. NG connected to LIS throughout the shift except to receive HS medication; clamped at HS for absorption of Seroquel.  Approx 500 mL brown output from NG.  No N/V.  Denies flatus.  BS hypoactive.  RLQ and LLQ tender to palpation.  Running IVF.  Tolerates PCD's at times.  VPM in place; patient is impulsive and pulls at his NG at times.  Redirection is difficult with memory loss.  Has Abd X-Ray at 0700 with scheduled exploratory laparoscopy at 0930 or 1000; daughter aware.  Per surgery note from today, can clamp NG if patient is tolerating, but per report, patient with emesis x2 on NOC when clamped.

## 2018-03-04 NOTE — PLAN OF CARE
Problem: Patient Care Overview  Goal: Plan of Care/Patient Progress Review  Outcome: Improving  Disoriented, oriented to person only. Full code. VSS on RA. Here w/ SBO vs. Ileus. NG tube clamped. Denied N/V. Reports abdominal pain. No gas. Hypoactive BS. Surgery consulted. Plan for abdominal x-ray today at 0700 and exploratory lap around 7044-6477 today. NPO. IV fluids infusing. Up impulsively. VPM in place. SBA. Fall precautions in place. D/C pending. Will cont. To monitor.

## 2018-03-04 NOTE — PLAN OF CARE
Problem: Patient Care Overview  Goal: Plan of Care/Patient Progress Review  Outcome: No Change  VSS except O2 sats in the 85- 85 % R/A applied oxygen 97 %/2L , Confused to time and place, lethargic- sleep with his eyes partially opened. Lung sounds diminished. Bowel sounds faints, Abdominal dressings CDI. Abdomen is flat but soft. Denies pain. NG tube 200 ml brown/bile colored. 150 urine output from alba catheter. Skin tear to left lower arm- dressing CDI. Turn and repo every 2 hours and PRN. Oral swabs. NPO.

## 2018-03-04 NOTE — PROVIDER NOTIFICATION
Dr Treadwell notified regarding a short run SVT with HR:168.  Pt converted himself to SR:90's.  VSS.  Telemetry ordered for floor.

## 2018-03-05 ENCOUNTER — APPOINTMENT (OUTPATIENT)
Dept: PHYSICAL THERAPY | Facility: CLINIC | Age: 83
DRG: 329 | End: 2018-03-05
Payer: COMMERCIAL

## 2018-03-05 ENCOUNTER — APPOINTMENT (OUTPATIENT)
Dept: OCCUPATIONAL THERAPY | Facility: CLINIC | Age: 83
DRG: 329 | End: 2018-03-05
Payer: COMMERCIAL

## 2018-03-05 LAB
ANION GAP SERPL CALCULATED.3IONS-SCNC: 3 MMOL/L (ref 3–14)
BASOPHILS # BLD AUTO: 0 10E9/L (ref 0–0.2)
BASOPHILS NFR BLD AUTO: 0.4 %
BUN SERPL-MCNC: 32 MG/DL (ref 7–30)
CALCIUM SERPL-MCNC: 8 MG/DL (ref 8.5–10.1)
CHLORIDE SERPL-SCNC: 119 MMOL/L (ref 94–109)
CO2 SERPL-SCNC: 30 MMOL/L (ref 20–32)
CREAT SERPL-MCNC: 1.15 MG/DL (ref 0.66–1.25)
DIFFERENTIAL METHOD BLD: ABNORMAL
EOSINOPHIL # BLD AUTO: 0.3 10E9/L (ref 0–0.7)
EOSINOPHIL NFR BLD AUTO: 5.9 %
ERYTHROCYTE [DISTWIDTH] IN BLOOD BY AUTOMATED COUNT: 12.8 % (ref 10–15)
GFR SERPL CREATININE-BSD FRML MDRD: 60 ML/MIN/1.7M2
GLUCOSE SERPL-MCNC: 114 MG/DL (ref 70–99)
HCT VFR BLD AUTO: 42 % (ref 40–53)
HGB BLD-MCNC: 13.5 G/DL (ref 13.3–17.7)
IMM GRANULOCYTES # BLD: 0 10E9/L (ref 0–0.4)
IMM GRANULOCYTES NFR BLD: 0.2 %
LYMPHOCYTES # BLD AUTO: 0.6 10E9/L (ref 0.8–5.3)
LYMPHOCYTES NFR BLD AUTO: 11.2 %
MAGNESIUM SERPL-MCNC: 2.3 MG/DL (ref 1.6–2.3)
MCH RBC QN AUTO: 32.8 PG (ref 26.5–33)
MCHC RBC AUTO-ENTMCNC: 32.1 G/DL (ref 31.5–36.5)
MCV RBC AUTO: 102 FL (ref 78–100)
MONOCYTES # BLD AUTO: 0.5 10E9/L (ref 0–1.3)
MONOCYTES NFR BLD AUTO: 10.5 %
NEUTROPHILS # BLD AUTO: 3.6 10E9/L (ref 1.6–8.3)
NEUTROPHILS NFR BLD AUTO: 71.8 %
PLATELET # BLD AUTO: 144 10E9/L (ref 150–450)
POTASSIUM SERPL-SCNC: 4.3 MMOL/L (ref 3.4–5.3)
RBC # BLD AUTO: 4.12 10E12/L (ref 4.4–5.9)
SODIUM SERPL-SCNC: 152 MMOL/L (ref 133–144)
WBC # BLD AUTO: 5.1 10E9/L (ref 4–11)

## 2018-03-05 PROCEDURE — 85025 COMPLETE CBC W/AUTO DIFF WBC: CPT | Performed by: INTERNAL MEDICINE

## 2018-03-05 PROCEDURE — 36415 COLL VENOUS BLD VENIPUNCTURE: CPT | Performed by: INTERNAL MEDICINE

## 2018-03-05 PROCEDURE — 83735 ASSAY OF MAGNESIUM: CPT | Performed by: INTERNAL MEDICINE

## 2018-03-05 PROCEDURE — 97116 GAIT TRAINING THERAPY: CPT | Mod: GP | Performed by: PHYSICAL THERAPIST

## 2018-03-05 PROCEDURE — 40000133 ZZH STATISTIC OT WARD VISIT

## 2018-03-05 PROCEDURE — 25000128 H RX IP 250 OP 636: Performed by: PHYSICIAN ASSISTANT

## 2018-03-05 PROCEDURE — 25000128 H RX IP 250 OP 636: Performed by: INTERNAL MEDICINE

## 2018-03-05 PROCEDURE — 25000132 ZZH RX MED GY IP 250 OP 250 PS 637: Performed by: INTERNAL MEDICINE

## 2018-03-05 PROCEDURE — 97530 THERAPEUTIC ACTIVITIES: CPT | Mod: GO

## 2018-03-05 PROCEDURE — 25800025 ZZH RX 258: Performed by: INTERNAL MEDICINE

## 2018-03-05 PROCEDURE — 97530 THERAPEUTIC ACTIVITIES: CPT | Mod: GP | Performed by: PHYSICAL THERAPIST

## 2018-03-05 PROCEDURE — 80048 BASIC METABOLIC PNL TOTAL CA: CPT | Performed by: INTERNAL MEDICINE

## 2018-03-05 PROCEDURE — 99232 SBSQ HOSP IP/OBS MODERATE 35: CPT | Performed by: INTERNAL MEDICINE

## 2018-03-05 PROCEDURE — 40000193 ZZH STATISTIC PT WARD VISIT: Performed by: PHYSICAL THERAPIST

## 2018-03-05 PROCEDURE — 25000125 ZZHC RX 250: Performed by: INTERNAL MEDICINE

## 2018-03-05 PROCEDURE — 12000007 ZZH R&B INTERMEDIATE

## 2018-03-05 RX ORDER — METOPROLOL TARTRATE 1 MG/ML
2.5 INJECTION, SOLUTION INTRAVENOUS EVERY 6 HOURS
Status: DISCONTINUED | OUTPATIENT
Start: 2018-03-05 | End: 2018-03-06

## 2018-03-05 RX ORDER — MAGNESIUM SULFATE HEPTAHYDRATE 40 MG/ML
4 INJECTION, SOLUTION INTRAVENOUS EVERY 4 HOURS PRN
Status: DISCONTINUED | OUTPATIENT
Start: 2018-03-05 | End: 2018-03-27 | Stop reason: HOSPADM

## 2018-03-05 RX ORDER — DEXTROSE MONOHYDRATE, SODIUM CHLORIDE, AND POTASSIUM CHLORIDE 50; 1.49; 4.5 G/1000ML; G/1000ML; G/1000ML
INJECTION, SOLUTION INTRAVENOUS CONTINUOUS
Status: DISCONTINUED | OUTPATIENT
Start: 2018-03-05 | End: 2018-03-06

## 2018-03-05 RX ADMIN — HALOPERIDOL LACTATE 2 MG: 5 INJECTION, SOLUTION INTRAMUSCULAR at 02:40

## 2018-03-05 RX ADMIN — POTASSIUM CHLORIDE, DEXTROSE MONOHYDRATE AND SODIUM CHLORIDE: 150; 5; 900 INJECTION, SOLUTION INTRAVENOUS at 04:16

## 2018-03-05 RX ADMIN — POTASSIUM CHLORIDE, DEXTROSE MONOHYDRATE AND SODIUM CHLORIDE: 150; 5; 450 INJECTION, SOLUTION INTRAVENOUS at 23:49

## 2018-03-05 RX ADMIN — HALOPERIDOL LACTATE 2 MG: 5 INJECTION, SOLUTION INTRAMUSCULAR at 12:41

## 2018-03-05 RX ADMIN — ACETAMINOPHEN 1000 MG: 10 INJECTION, SOLUTION INTRAVENOUS at 10:48

## 2018-03-05 RX ADMIN — METOPROLOL TARTRATE 2.5 MG: 5 INJECTION, SOLUTION INTRAVENOUS at 16:12

## 2018-03-05 RX ADMIN — Medication 1 SPRAY: at 13:45

## 2018-03-05 RX ADMIN — Medication 12.5 MG: at 21:09

## 2018-03-05 RX ADMIN — METOPROLOL TARTRATE 2.5 MG: 5 INJECTION, SOLUTION INTRAVENOUS at 22:27

## 2018-03-05 RX ADMIN — POTASSIUM CHLORIDE, DEXTROSE MONOHYDRATE AND SODIUM CHLORIDE: 150; 5; 450 INJECTION, SOLUTION INTRAVENOUS at 16:01

## 2018-03-05 NOTE — PLAN OF CARE
Problem: Patient Care Overview  Goal: Plan of Care/Patient Progress Review  PT: Noted patient is now POD # 1 exploratory laparoscopy. Updated goals and POC.   Discharge Planner PT   Patient plan for discharge: none stated  Current status: Patient denied pain at rest and abdominal discomfort with movement. Pt performed supine-sit with SBA, pt required cues to maintain abdominal precautions with log roll technique. Sit <> stand with FWW and Sidney. Pt tolerated ambulation of 65 feet with FWW and CGA.  Barriers to return to prior living situation: stairs (to be addressed in therapy), lives alone, decreased activity tolerance, decreased balance, cognitive status  Recommendations for discharge: TCU  Rationale for recommendations: Pt would benefit from continued skilled PT intervention at TCU prior to returning home alone in order to increase independence and safety with functional mobility.         Entered by: Antonia Meyers 03/05/2018 5:24 PM

## 2018-03-05 NOTE — PLAN OF CARE
Problem: Patient Care Overview  Goal: Plan of Care/Patient Progress Review  Discharge Planner OT   Patient plan for discharge: None stated  Current status: Pt now POD #1 s/p ex laparoscopy with lysis of adhesions and short segment small bowel resection. Goals and POC updated. Pt educated on abdominal precautions, decreased carryover noted, but able to maintain with verbal cueing during session. Pt completed bed mobility supine <> sit EOB using log roll technique with mod A. Pt able to tolerate sitting EOB with CGA/SBA, no LOB. Pt required mod A for sit > stand from EOB, able to tolerate brief standing task <20 seconds, unsteady balance noted.   Barriers to return to prior living situation: Current level of assist required for ADLs/mobility, lives alone, impaired safety/cognition, decreased strength/functional activity tolerance  Recommendations for discharge: TCU  Rationale for recommendations: Pt below baseline level of functioning and would benefit from continued skilled OT to maximize safety and indep in all ADLs, IADLs and mobility tasks due to current deficits.        Entered by: Antonia Bray 03/05/2018 9:14 AM

## 2018-03-05 NOTE — PROVIDER NOTIFICATION
Paged Dr. Lauren regarding pt's more frequent episodes of SVT for about 5-7 beats as well as low urine output, orders received.

## 2018-03-05 NOTE — PLAN OF CARE
Problem: Bowel Obstruction (Adult)  Goal: Signs and Symptoms of Listed Potential Problems Will be Absent, Minimized or Managed (Bowel Obstruction)  Signs and symptoms of listed potential problems will be absent, minimized or managed by discharge/transition of care (reference Bowel Obstruction (Adult) CPG).   Outcome: No Change  Disoriented to time, place, and forgetful. VSS on 1.5LPM via nc. Tele: NSR. LS diminished. Bowel sounds hypo/faint, no flatus. Segal patent, low UOP (175ml) fluid bolus currently infusing. NG to LIS. Dressings to abd CDI. Dressing to skin tear on left arm CDI. Pt dangled on shift. NPO.

## 2018-03-05 NOTE — PROVIDER NOTIFICATION
Call made to on call (Dr. Gomez) regarding patient's oral medications. Per MD dewitt to clamp NG for 30 min's to give oral medication.

## 2018-03-05 NOTE — PLAN OF CARE
Problem: Patient Care Overview  Goal: Plan of Care/Patient Progress Review  Outcome: No Change  Disoriented, oriented to person, sitter at bedside. Given haldol x1 for agitation. VSS on 2L NC. Tele NSR. Abd incsion CDI, lap sites x3 bandaids CDI. Denies pain. Faint BS. -Gas. NG to LIS, brown output. LS dim. Segal with adequate output. NPO except meds.

## 2018-03-05 NOTE — PROGRESS NOTES
[facility]    Internal Medicine Hospitalist Progress Note  03/05/2018  I evaluated patient on the above date.    Juventino Lauren Jr., MD  [phone] (p)  Text Page (7 am to 6 pm)      Assessment & Plan   Mr. Joseluis Falcon is an 85-year-old male with a past medical history significant for psoriasis and bilateral inguinal herniorrhaphy, who presented 2/27 with complaints of pain in abdomen and vomiting and noted with a small bowel obstruction.     1. SBO - s/p laparoscopic TISHA and lap-assisted SB resection 3/4/2018.  Hx of bilateral inguinal herniorrhaphy. CT 2/27 showed proximal SBO with abnormal wall thickening of the mid SB loops. NG placed 3/1. Challenged with Gastrografin 3/3 and axr showed some contrast in proximal R colon and AFL, with some suggestion of adynamic ileus rather than obstruction. X-rays 3/4 showed increasing SB distension. Taken to surgery 3/4/2018.  - Post-op management per Surgery.  - Advance diet as able per Surgery.  - Continue IVF's - change as below.  - Continue PRN IV acetaminophen, PRN IV Dilaudid (minimize opioids as able).  - Ambulate as able.  - Appreciate help from Surgery.    2. Hypernatremia due to hypertonic fluids and decreased PO intake.   Recent Labs  Lab 03/05/18  0813 03/02/18  0810 03/01/18  0736 02/28/18  0743 02/27/18  1219   * 143 139 139 139   - Change from D5 NS to D5 1/2 NS with 20 meq KCl.  - Monitor BMP.    3. Acute kidney injury, stage 1, suspect prerenal.  Cr up to 1.30 on 3/1.     Recent Labs  Lab 03/05/18  0813 03/02/18  0810 03/01/18  0736 02/28/18  0743 02/27/18  1219   CR 1.15 1.25 1.30* 1.09 1.21   - Continue IVF's.  - Monitor BMP.  - Avoid nephrotoxic medications.    4. Acute delirium.  Confused and agitated requiring Haldol, Zyprexa at times.  - Continue Seroquel 12.5 mg qHS as able.  - Continue PRN Zyprexa and PRN Haldol.    Prophylaxis.  - PCD's, ambulation.    CODE STATUS: FULL.    Dispo.  - Pending above.    Interval History   Noted  "with agitation during the night.  Doing OK this am. Has some pain in abdomen.    -Data reviewed today: I reviewed all new labs and imaging over the last 24 hours. I personally reviewed no images or EKG's today.    Physical Exam   Heart Rate: 95, Blood pressure 108/66, pulse 78, temperature 97.7  F (36.5  C), temperature source Oral, resp. rate 16, height 1.753 m (5' 9\"), weight 74.8 kg (164 lb 14.5 oz), SpO2 95 %.  Vitals:    02/27/18 1212 02/28/18 0700   Weight: 74.8 kg (165 lb) 74.8 kg (164 lb 14.5 oz)     Vital Signs with Ranges  Temp:  [97.7  F (36.5  C)-99.1  F (37.3  C)] 97.7  F (36.5  C)  Pulse:  [78-91] 78  Heart Rate:  [85-96] 95  Resp:  [11-27] 16  BP: ()/() 108/66  SpO2:  [68 %-100 %] 95 %  Patient Vitals for the past 24 hrs:   BP Temp Temp src Pulse Heart Rate Resp SpO2   03/05/18 0750 108/66 97.7  F (36.5  C) Oral - 95 16 95 %   03/05/18 0412 105/62 97.9  F (36.6  C) Axillary 78 - 16 97 %   03/05/18 0058 106/54 98  F (36.7  C) Axillary 80 - 18 99 %   03/04/18 2003 - - - - - 16 -   03/04/18 2000 109/63 - - - 90 16 96 %   03/04/18 1918 95/62 99  F (37.2  C) Axillary 88 - 18 97 %   03/04/18 1803 100/57 - - - 94 18 95 %   03/04/18 1700 116/70 - - - 94 18 97 %   03/04/18 1600 117/68 - - - 88 16 98 %   03/04/18 1523 115/86 98.7  F (37.1  C) - 87 - 16 98 %   03/04/18 1447 109/63 - - 91 - 12 98 %   03/04/18 1417 121/70 - - 85 - 12 100 %   03/04/18 1347 119/70 98.4  F (36.9  C) - - 85 18 98 %   03/04/18 1318 - - - - - - 94 %   03/04/18 1240 143/84 99  F (37.2  C) - - 90 25 94 %   03/04/18 1230 134/79 99.1  F (37.3  C) - - 92 11 93 %   03/04/18 1220 (!) 136/100 99  F (37.2  C) - - 87 23 93 %   03/04/18 1210 147/74 99  F (37.2  C) - - 90 19 94 %   03/04/18 1200 150/80 98.8  F (37.1  C) - - 96 27 97 %   03/04/18 1150 (!) 126/97 98.8  F (37.1  C) - - 87 25 100 %   03/04/18 1140 142/83 98.8  F (37.1  C) - - 96 25 (!) 68 %   03/04/18 1130 142/83 98.7  F (37.1  C) Temporal - 95 16 -     I/O's Last 24 " hours  I/O last 3 completed shifts:  In: 2894 [I.V.:2354; NG/GT:40]  Out: 2390 [Urine:840; Emesis/NG output:1050; Other:500]    Constitutional: Alert, pleasant, confused.  Respiratory: Diminished in bases. No crackles or wheezes.  Cardiovascular: RRR no m/r/g.  GI: Mild distension, hypoactive, no r/g tenderness to light touch.  Skin/Integumen:   Other:        Data     Recent Labs  Lab 03/05/18 0813 03/04/18  0800 03/02/18  0810 03/01/18  0736 02/28/18  0743 02/27/18  1219   WBC 5.1  --   --   --  5.0 12.6*   HGB 13.5 14.1  --   --  14.1 15.4   *  --   --   --  99 98   *  --   --   --  133* 154   INR  --   --   --   --   --  1.02   *  --  143 139 139 139   POTASSIUM 4.3  --  4.6 4.4 4.3 3.6   CHLORIDE 119*  --  111* 105 106 100   CO2 30  --  26 31 28 31   BUN 32*  --  36* 30 27 25   CR 1.15  --  1.25 1.30* 1.09 1.21   ANIONGAP 3  --  6 3 5 8   TOBI 8.0*  --  8.4* 8.3* 8.5 8.9   *  --  166* 127* 130* 168*   ALBUMIN  --   --   --   --   --  3.7   PROTTOTAL  --   --   --   --   --  7.1   BILITOTAL  --   --   --   --   --  1.3   ALKPHOS  --   --   --   --   --  97   ALT  --   --   --   --   --  16   AST  --   --   --   --   --  13   LIPASE  --   --   --   --   --  92   TROPI  --   --   --   --   --  <0.015     Recent Labs   Lab Test  03/05/18 0813 03/02/18   0810  03/01/18   0736  02/28/18   0743  02/27/18   1219   GLC  114*  166*  127*  130*  168*         No results found for this or any previous visit (from the past 24 hour(s)).    Medications   All medications were reviewed.    dextrose 5% and 0.45% NaCl + KCl 20 mEq/L         QUEtiapine  12.5 mg Oral At Bedtime     OLANZapine  5 mg Intramuscular Once

## 2018-03-05 NOTE — PROGRESS NOTES
"Surgery    Patient without complaints.   Denies bowel function    Gen:  Awake, Alert, NAD, pleasant, disoriented  Blood pressure 108/66, pulse 78, temperature 97.7  F (36.5  C), temperature source Oral, resp. rate 16, height 1.753 m (5' 9\"), weight 74.8 kg (164 lb 14.5 oz), SpO2 95 %.  Resp - clear to ascultation.    Cardiac - Regular rate & rhythm without murmur  Abdomen - soft, appropriately tender, non distended. Dressings clean  Extremities - no lower extremity edema.    NG - 550out overnight. Bilious.  Labs pending.    A/P 85 year old with small bowel obstruction s/p ex laparoscopy with lysis of adhesions and short segment small bowel resection on 3/4/18.    - continue NGT.   - nutrition consult per family request.   - needs sitter for delirium  - mobilize. Therapies consulted.   - discussed op.  - await bowel function    Bryan Griffin PA-C  Office: [phone]  Pager: [phone]  "

## 2018-03-06 ENCOUNTER — APPOINTMENT (OUTPATIENT)
Dept: CARDIOLOGY | Facility: CLINIC | Age: 83
DRG: 329 | End: 2018-03-06
Attending: INTERNAL MEDICINE
Payer: COMMERCIAL

## 2018-03-06 ENCOUNTER — APPOINTMENT (OUTPATIENT)
Dept: GENERAL RADIOLOGY | Facility: CLINIC | Age: 83
DRG: 329 | End: 2018-03-06
Attending: INTERNAL MEDICINE
Payer: COMMERCIAL

## 2018-03-06 ENCOUNTER — APPOINTMENT (OUTPATIENT)
Dept: CT IMAGING | Facility: CLINIC | Age: 83
DRG: 329 | End: 2018-03-06
Attending: INTERNAL MEDICINE
Payer: COMMERCIAL

## 2018-03-06 LAB
ANION GAP SERPL CALCULATED.3IONS-SCNC: 3 MMOL/L (ref 3–14)
BASE DEFICIT BLDA-SCNC: NORMAL MMOL/L
BASE EXCESS BLDA CALC-SCNC: 6.7 MMOL/L
BASE EXCESS BLDA CALC-SCNC: NORMAL MMOL/L
BASOPHILS # BLD AUTO: 0.1 10E9/L (ref 0–0.2)
BASOPHILS NFR BLD AUTO: 1 %
BUN SERPL-MCNC: 31 MG/DL (ref 7–30)
CALCIUM SERPL-MCNC: 8.2 MG/DL (ref 8.5–10.1)
CHLORIDE SERPL-SCNC: 116 MMOL/L (ref 94–109)
CO2 SERPL-SCNC: 34 MMOL/L (ref 20–32)
COPATH REPORT: NORMAL
CREAT SERPL-MCNC: 1.37 MG/DL (ref 0.66–1.25)
DIFFERENTIAL METHOD BLD: ABNORMAL
EOSINOPHIL # BLD AUTO: 0.2 10E9/L (ref 0–0.7)
EOSINOPHIL NFR BLD AUTO: 3 %
ERYTHROCYTE [DISTWIDTH] IN BLOOD BY AUTOMATED COUNT: 12.7 % (ref 10–15)
GFR SERPL CREATININE-BSD FRML MDRD: 49 ML/MIN/1.7M2
GLUCOSE SERPL-MCNC: 140 MG/DL (ref 70–99)
HCO3 BLD-SCNC: 32 MMOL/L (ref 21–28)
HCO3 BLD-SCNC: NORMAL MMOL/L (ref 21–28)
HCT VFR BLD AUTO: 43.1 % (ref 40–53)
HGB BLD-MCNC: 13.5 G/DL (ref 13.3–17.7)
LACTATE BLD-SCNC: 1.1 MMOL/L (ref 0.7–2)
LACTATE BLD-SCNC: 1.4 MMOL/L (ref 0.7–2)
LMWH PPP CHRO-ACNC: <0.1 IU/ML
LYMPHOCYTES # BLD AUTO: 0.9 10E9/L (ref 0.8–5.3)
LYMPHOCYTES NFR BLD AUTO: 12 %
MAGNESIUM SERPL-MCNC: 2.6 MG/DL (ref 1.6–2.3)
MCH RBC QN AUTO: 32.4 PG (ref 26.5–33)
MCHC RBC AUTO-ENTMCNC: 31.3 G/DL (ref 31.5–36.5)
MCV RBC AUTO: 103 FL (ref 78–100)
MONOCYTES # BLD AUTO: 0.8 10E9/L (ref 0–1.3)
MONOCYTES NFR BLD AUTO: 11 %
NEUTROPHILS # BLD AUTO: 5.1 10E9/L (ref 1.6–8.3)
NEUTROPHILS NFR BLD AUTO: 73 %
OVALOCYTES BLD QL SMEAR: SLIGHT
OXYHGB MFR BLD: 96 % (ref 92–100)
OXYHGB MFR BLD: NORMAL % (ref 92–100)
PCO2 BLD: 44 MM HG (ref 35–45)
PCO2 BLD: NORMAL MM HG (ref 35–45)
PH BLD: 7.46 PH (ref 7.35–7.45)
PH BLD: NORMAL PH (ref 7.35–7.45)
PHOSPHATE SERPL-MCNC: 3 MG/DL (ref 2.5–4.5)
PLATELET # BLD AUTO: 147 10E9/L (ref 150–450)
PLATELET # BLD EST: ABNORMAL 10*3/UL
PO2 BLD: 83 MM HG (ref 80–105)
PO2 BLD: NORMAL MM HG (ref 80–105)
POTASSIUM SERPL-SCNC: 4.1 MMOL/L (ref 3.4–5.3)
PROCALCITONIN SERPL-MCNC: 1.06 NG/ML
RBC # BLD AUTO: 4.17 10E12/L (ref 4.4–5.9)
SODIUM SERPL-SCNC: 153 MMOL/L (ref 133–144)
TRIGL SERPL-MCNC: 109 MG/DL
TROPONIN I SERPL-MCNC: <0.015 UG/L (ref 0–0.04)
TSH SERPL DL<=0.005 MIU/L-ACNC: 1.72 MU/L (ref 0.4–4)
WBC # BLD AUTO: 7.1 10E9/L (ref 4–11)

## 2018-03-06 PROCEDURE — 40000915 ZZH STATISTIC SITTER, EVENING HOURS

## 2018-03-06 PROCEDURE — 36569 INSJ PICC 5 YR+ W/O IMAGING: CPT

## 2018-03-06 PROCEDURE — 40000986 XR CHEST PORT 1 VW

## 2018-03-06 PROCEDURE — 99291 CRITICAL CARE FIRST HOUR: CPT | Performed by: INTERNAL MEDICINE

## 2018-03-06 PROCEDURE — 99356 ZZC PROLONGED SERV,INPATIENT,1ST HR: CPT | Performed by: NURSE PRACTITIONER

## 2018-03-06 PROCEDURE — 71260 CT THORAX DX C+: CPT

## 2018-03-06 PROCEDURE — 25000132 ZZH RX MED GY IP 250 OP 250 PS 637: Performed by: INTERNAL MEDICINE

## 2018-03-06 PROCEDURE — 80048 BASIC METABOLIC PNL TOTAL CA: CPT | Performed by: INTERNAL MEDICINE

## 2018-03-06 PROCEDURE — 27210222 ZZH KIT SHRLOCK 6FR POWER PICC

## 2018-03-06 PROCEDURE — 84478 ASSAY OF TRIGLYCERIDES: CPT | Performed by: NURSE PRACTITIONER

## 2018-03-06 PROCEDURE — 3E0436Z INTRODUCTION OF NUTRITIONAL SUBSTANCE INTO CENTRAL VEIN, PERCUTANEOUS APPROACH: ICD-10-PCS | Performed by: HOSPITALIST

## 2018-03-06 PROCEDURE — 40000276 ZZH STATISTIC RCP TIME ED VENT EA 10 MIN

## 2018-03-06 PROCEDURE — 25000128 H RX IP 250 OP 636: Performed by: INTERNAL MEDICINE

## 2018-03-06 PROCEDURE — 40000275 ZZH STATISTIC RCP TIME EA 10 MIN

## 2018-03-06 PROCEDURE — 40000885 ZZH STATISTIC STEP DOWN HRS EVENING

## 2018-03-06 PROCEDURE — 21400002 ZZH R&B CCU CICU CRITICAL

## 2018-03-06 PROCEDURE — 36415 COLL VENOUS BLD VENIPUNCTURE: CPT | Performed by: INTERNAL MEDICINE

## 2018-03-06 PROCEDURE — 93010 ELECTROCARDIOGRAM REPORT: CPT | Mod: 76 | Performed by: INTERNAL MEDICINE

## 2018-03-06 PROCEDURE — 25000128 H RX IP 250 OP 636: Performed by: HOSPITALIST

## 2018-03-06 PROCEDURE — 85520 HEPARIN ASSAY: CPT | Performed by: HOSPITALIST

## 2018-03-06 PROCEDURE — 83605 ASSAY OF LACTIC ACID: CPT | Performed by: INTERNAL MEDICINE

## 2018-03-06 PROCEDURE — 84145 PROCALCITONIN (PCT): CPT | Performed by: NURSE PRACTITIONER

## 2018-03-06 PROCEDURE — 25000125 ZZHC RX 250: Performed by: INTERNAL MEDICINE

## 2018-03-06 PROCEDURE — 25000128 H RX IP 250 OP 636: Performed by: NURSE PRACTITIONER

## 2018-03-06 PROCEDURE — 25000125 ZZHC RX 250: Performed by: HOSPITALIST

## 2018-03-06 PROCEDURE — 93306 TTE W/DOPPLER COMPLETE: CPT | Mod: 26 | Performed by: INTERNAL MEDICINE

## 2018-03-06 PROCEDURE — 84100 ASSAY OF PHOSPHORUS: CPT | Performed by: NURSE PRACTITIONER

## 2018-03-06 PROCEDURE — 99291 CRITICAL CARE FIRST HOUR: CPT | Mod: 25 | Performed by: INTERNAL MEDICINE

## 2018-03-06 PROCEDURE — 93005 ELECTROCARDIOGRAM TRACING: CPT

## 2018-03-06 PROCEDURE — 82805 BLOOD GASES W/O2 SATURATION: CPT | Performed by: HOSPITALIST

## 2018-03-06 PROCEDURE — 83605 ASSAY OF LACTIC ACID: CPT | Performed by: NURSE PRACTITIONER

## 2018-03-06 PROCEDURE — 85025 COMPLETE CBC W/AUTO DIFF WBC: CPT | Performed by: INTERNAL MEDICINE

## 2018-03-06 PROCEDURE — 36600 WITHDRAWAL OF ARTERIAL BLOOD: CPT

## 2018-03-06 PROCEDURE — 84443 ASSAY THYROID STIM HORMONE: CPT | Performed by: NURSE PRACTITIONER

## 2018-03-06 PROCEDURE — 82805 BLOOD GASES W/O2 SATURATION: CPT | Performed by: NURSE PRACTITIONER

## 2018-03-06 PROCEDURE — 36415 COLL VENOUS BLD VENIPUNCTURE: CPT | Performed by: NURSE PRACTITIONER

## 2018-03-06 PROCEDURE — 83735 ASSAY OF MAGNESIUM: CPT | Performed by: NURSE PRACTITIONER

## 2018-03-06 PROCEDURE — 25000125 ZZHC RX 250

## 2018-03-06 PROCEDURE — 25000128 H RX IP 250 OP 636

## 2018-03-06 PROCEDURE — 93306 TTE W/DOPPLER COMPLETE: CPT

## 2018-03-06 PROCEDURE — 84484 ASSAY OF TROPONIN QUANT: CPT | Performed by: NURSE PRACTITIONER

## 2018-03-06 RX ORDER — LIDOCAINE 40 MG/G
CREAM TOPICAL
Status: DISCONTINUED | OUTPATIENT
Start: 2018-03-06 | End: 2018-03-20

## 2018-03-06 RX ORDER — DEXTROSE MONOHYDRATE 25 G/50ML
25-50 INJECTION, SOLUTION INTRAVENOUS
Status: DISCONTINUED | OUTPATIENT
Start: 2018-03-06 | End: 2018-03-07

## 2018-03-06 RX ORDER — DIGOXIN 0.25 MG/ML
250 INJECTION INTRAMUSCULAR; INTRAVENOUS ONCE
Status: COMPLETED | OUTPATIENT
Start: 2018-03-06 | End: 2018-03-06

## 2018-03-06 RX ORDER — NICOTINE POLACRILEX 4 MG
15-30 LOZENGE BUCCAL
Status: DISCONTINUED | OUTPATIENT
Start: 2018-03-06 | End: 2018-03-07

## 2018-03-06 RX ORDER — DEXTROSE MONOHYDRATE 50 MG/ML
INJECTION, SOLUTION INTRAVENOUS CONTINUOUS
Status: DISCONTINUED | OUTPATIENT
Start: 2018-03-06 | End: 2018-03-06

## 2018-03-06 RX ORDER — METOPROLOL TARTRATE 1 MG/ML
5 INJECTION, SOLUTION INTRAVENOUS EVERY 5 MIN PRN
Status: DISCONTINUED | OUTPATIENT
Start: 2018-03-06 | End: 2018-03-11

## 2018-03-06 RX ORDER — PIPERACILLIN SODIUM, TAZOBACTAM SODIUM 3; .375 G/15ML; G/15ML
3.38 INJECTION, POWDER, LYOPHILIZED, FOR SOLUTION INTRAVENOUS EVERY 6 HOURS
Status: DISCONTINUED | OUTPATIENT
Start: 2018-03-06 | End: 2018-03-09

## 2018-03-06 RX ORDER — DEXTROSE MONOHYDRATE 50 MG/ML
INJECTION, SOLUTION INTRAVENOUS CONTINUOUS
Status: DISCONTINUED | OUTPATIENT
Start: 2018-03-06 | End: 2018-03-09

## 2018-03-06 RX ORDER — NITROGLYCERIN 0.4 MG/1
0.4 TABLET SUBLINGUAL EVERY 5 MIN PRN
Status: DISCONTINUED | OUTPATIENT
Start: 2018-03-06 | End: 2018-03-20

## 2018-03-06 RX ORDER — METOPROLOL TARTRATE 1 MG/ML
5 INJECTION, SOLUTION INTRAVENOUS ONCE
Status: DISCONTINUED | OUTPATIENT
Start: 2018-03-06 | End: 2018-03-11

## 2018-03-06 RX ORDER — IOPAMIDOL 755 MG/ML
63 INJECTION, SOLUTION INTRAVASCULAR ONCE
Status: COMPLETED | OUTPATIENT
Start: 2018-03-06 | End: 2018-03-06

## 2018-03-06 RX ADMIN — DIGOXIN 250 MCG: 0.25 INJECTION INTRAMUSCULAR; INTRAVENOUS at 10:52

## 2018-03-06 RX ADMIN — HALOPERIDOL LACTATE 2 MG: 5 INJECTION, SOLUTION INTRAMUSCULAR at 16:13

## 2018-03-06 RX ADMIN — HEPARIN SODIUM 850 UNITS/HR: 10000 INJECTION, SOLUTION INTRAVENOUS at 12:38

## 2018-03-06 RX ADMIN — PIPERACILLIN SODIUM,TAZOBACTAM SODIUM 3.38 G: 3; .375 INJECTION, POWDER, FOR SOLUTION INTRAVENOUS at 09:42

## 2018-03-06 RX ADMIN — PIPERACILLIN SODIUM,TAZOBACTAM SODIUM 3.38 G: 3; .375 INJECTION, POWDER, FOR SOLUTION INTRAVENOUS at 21:57

## 2018-03-06 RX ADMIN — Medication 0.5 MG/MIN: at 16:43

## 2018-03-06 RX ADMIN — PIPERACILLIN SODIUM,TAZOBACTAM SODIUM 3.38 G: 3; .375 INJECTION, POWDER, FOR SOLUTION INTRAVENOUS at 15:35

## 2018-03-06 RX ADMIN — SODIUM CHLORIDE 89 ML: 9 INJECTION, SOLUTION INTRAVENOUS at 05:01

## 2018-03-06 RX ADMIN — FAMOTIDINE 20 MG: 10 INJECTION, SOLUTION INTRAVENOUS at 10:52

## 2018-03-06 RX ADMIN — DEXTROSE MONOHYDRATE: 50 INJECTION, SOLUTION INTRAVENOUS at 13:55

## 2018-03-06 RX ADMIN — METOPROLOL TARTRATE 5 MG: 5 INJECTION, SOLUTION INTRAVENOUS at 01:40

## 2018-03-06 RX ADMIN — HALOPERIDOL LACTATE 2 MG: 5 INJECTION, SOLUTION INTRAMUSCULAR at 04:32

## 2018-03-06 RX ADMIN — AMIODARONE HYDROCHLORIDE 1 MG/MIN: 50 INJECTION, SOLUTION INTRAVENOUS at 10:58

## 2018-03-06 RX ADMIN — SODIUM CHLORIDE 1000 ML: 9 INJECTION, SOLUTION INTRAVENOUS at 09:32

## 2018-03-06 RX ADMIN — IOPAMIDOL 63 ML: 755 INJECTION, SOLUTION INTRAVENOUS at 04:45

## 2018-03-06 RX ADMIN — METOPROLOL TARTRATE 5 MG: 5 INJECTION, SOLUTION INTRAVENOUS at 03:29

## 2018-03-06 RX ADMIN — I.V. FAT EMULSION 250 ML: 20 EMULSION INTRAVENOUS at 20:17

## 2018-03-06 RX ADMIN — ASCORBIC ACID, VITAMIN A PALMITATE, CHOLECALCIFEROL, THIAMINE HYDROCHLORIDE, RIBOFLAVIN-5 PHOSPHATE SODIUM, PYRIDOXINE HYDROCHLORIDE, NIACINAMIDE, DEXPANTHENOL, ALPHA-TOCOPHEROL ACETATE, VITAMIN K1, FOLIC ACID, BIOTIN, CYANOCOBALAMIN: 200; 3300; 200; 6; 3.6; 6; 40; 15; 10; 150; 600; 60; 5 INJECTION, SOLUTION INTRAVENOUS at 20:05

## 2018-03-06 RX ADMIN — Medication 0.5 MG/MIN: at 22:10

## 2018-03-06 RX ADMIN — Medication 12.5 MG: at 22:01

## 2018-03-06 RX ADMIN — Medication 1 MG/MIN: at 15:18

## 2018-03-06 RX ADMIN — DILTIAZEM HYDROCHLORIDE 5 MG/HR: 5 INJECTION INTRAVENOUS at 08:56

## 2018-03-06 RX ADMIN — LIDOCAINE HYDROCHLORIDE 3 ML: 10 INJECTION, SOLUTION EPIDURAL; INFILTRATION; INTRACAUDAL; PERINEURAL at 12:32

## 2018-03-06 NOTE — CONSULTS
CLINICAL NUTRITION SERVICES  -  ASSESSMENT NOTE      Recommendations Ordered by Registered Dietitian (RD):   3/6:  Step 1 - Begin TPN D15 AA5 at 50 mL/hr + Lipids 250 mL every other day = 1102 kcals, 60 gm pro (0.8 gm/kg), 180 gm CHO.  Decrease D5 IVF to 40 mL/hr = 48 gm CHO, 163 kcals.  Total (TPN/Lipid + D5 IVF):  1265 kcals (17 kcal/kg), 20% fat.    Step 2 - After 24 hrs, if labs acceptable (K, Mg, Phos), increase TPN to D15 AA5 at 70 mL/hr + Lipids 250 mL 5x/wk (Mon-Fri) = 1550 kcals, 84 gm pro (1.1 gm/kg), 252 gm CHO.  Decrease D5 IVF to 20 mL/hr = 24 gm CHO, 82 kcals.  Total (TPN/Lipid + D5 IVF):  1630 kcals (19 kcal/kg).    Step 3 - After 24 hrs, if labs acceptable (K, Mg, Phos) increase TPN to D15 AA5 at 90 mL/hr + Lipids 250 mL daily = 2034 kcals (27 kcal/kg), 108 gm pro (1.4 gm/kg), 324 gm CHO, 25% fat.  Stop D5 IVF.   Malnutrition:   % Weight Loss:  Unable to determine without recent history  % Intake:  </= 50% for >/= 5 days (severe malnutrition)  Subcutaneous Fat Loss:  Orbital region moderate depletion and Upper arm region moderate depletion  Muscle Loss:  Temporal region severe depletion and Clavicle bone region severe depletion  Fluid Retention:  None noted    Malnutrition Diagnosis: Severe malnutrition  In Context of:  Acute illness or injury  Environmental or social circumstances        REASON FOR ASSESSMENT  Joseluis Falcon is a 85 year old male seen by Registered Dietitian for Pharmacy/Nutrition to Start and Manage PN and LOS      NUTRITION HISTORY  - Unable to obtain nutrition history as pt sleepy and no family available.  Pt lives alone and wife is in a memory care facility.  Pt had complaints of constipation, N/V for a couple of days PTA.  No food allergies/intolerances noted.      CURRENT NUTRITION ORDERS  Diet Order:     NPO     Current Intake/Tolerance:  NPO since admission x 8 days.  Thus total days of inadequate nutrition likely ~ 10 days.  Pt has received D5 containing IVF since admission,  "ranging from -125 mL/hr.  This should help protect from refeeding syndrome.       PHYSICAL FINDINGS  Observed  Muscle Wasting - temporal, clavicle  Subcutaneous fat loss - arms, orbital  Obtained from Chart/Interdisciplinary Team  None noted    ANTHROPOMETRICS  Height: 5' 9\"  Admit Weight:  74.8 kg (164 lbs 14.47 oz) --> Pt looks to be at much lower weight (?)  Current Wt:  Not available  Body mass index is 24.35 kg/(m^2).  Weight Status:  Normal BMI  IBW:  72.7 kg  % IBW:  103%  Weight History:  As below, weight is up 12 kg over the past 9 months.    Wt Readings from Last 20 Encounters:   02/28/18 74.8 kg (164 lb 14.5 oz)   06/09/17 62.8 kg (138 lb 8 oz)   05/25/17 65.8 kg (145 lb)   12/01/15 75.3 kg (166 lb)   11/23/15 80.3 kg (177 lb)   10/31/14 88 kg (194 lb)   02/03/14 86.2 kg (190 lb)       LABS  Labs reviewed  Na 153 (H)   BUN 31 (H)  Cr 1.37 (H) - Pt with BJ (prerenal per MD)  K 4.1 (NL)  Mg 2.6 (NL)   Phos 3.0 (NL) - acceptable.    MEDICATIONS  Medications reviewed  D5 IVF at 75 mL/hr = 90 gm CHO, 306 kcals (4 kcal/kg)    Dosing Weight:  74.8 kg (admit wt)     ASSESSED NUTRITION NEEDS PER APPROVED PRACTICE GUIDELINES:  Estimated Energy Needs: (3 step progression d/t risk for refeeding syndrome)  Step 1:  5964-4392 kcals (15-20 kcal/kg)   Step 2:  5362-1885 kcals (20-25 kcal/kg)  Step 3:  3322-0452 kcals (25-30 kcal/kg)  Justification: maintenance  Estimated Protein Needs:   grams protein (1.3-1.6 g pro/Kg) justification: Repletion and hypercatabolism with acute illness, possible CRF  Estimated Fluid Needs:  8116-3989 mL (25-30 mL/kg)  Justification: maintenance and per provider pending fluid status    MALNUTRITION:  % Weight Loss:  Unable to determine without recent history  % Intake:  </= 50% for >/= 5 days (severe malnutrition)  Subcutaneous Fat Loss:  Orbital region moderate depletion and Upper arm region moderate depletion  Muscle Loss:  Temporal region severe depletion and Clavicle bone " region severe depletion  Fluid Retention:  None noted    Malnutrition Diagnosis: Severe malnutrition  In Context of:  Acute illness or injury  Environmental or social circumstances    NUTRITION DIAGNOSIS:  Inadequate protein-energy intake related to altered GI function s/p surgery as evidenced by limited nutritional intake over past 10 days, severe muscle wasting, and D5 IVF meeting only 16% energy and 0% protein needs.      NUTRITION INTERVENTIONS  Recommendations / Nutrition Prescription  3/6:  Step 1 - Begin TPN D15 AA5 at 50 mL/hr + Lipids 250 mL every other day = 1102 kcals, 60 gm pro (0.8 gm/kg), 180 gm CHO.  Decrease D5 IVF to 40 mL/hr = 48 gm CHO, 163 kcals.  Total (TPN/Lipid + D5 IVF):  1265 kcals (17 kcal/kg), 20% fat.    Step 2 - After 24 hrs, if labs acceptable (K, Mg, Phos), increase TPN to D15 AA5 at 70 mL/hr + Lipids 250 mL 5x/wk (Mon-Fri) = 1550 kcals, 84 gm pro (1.1 gm/kg), 252 gm CHO.  Decrease D5 IVF to 20 mL/hr = 24 gm CHO, 82 kcals.  Total (TPN/Lipid + D5 IVF):  1630 kcals (19 kcal/kg).    Step 3 - After 24 hrs, if labs acceptable (K, Mg, Phos) increase TPN to D15 AA5 at 90 mL/hr + Lipids 250 mL daily = 2034 kcals (27 kcal/kg), 108 gm pro (1.4 gm/kg), 324 gm CHO, 25% fat.  Stop D5 IVF.    Implementation  Nutrition education: Not appropriate at this time due to patient condition  Collaboration and Referral of Nutrition care - Discussed with Pharmacist my plan to begin [employer]im[employer] tonight.  PN Composition, PN Schedule - Entered TPN [facility] in [facility] as above.  Will keep total fluid (TPN + D5 IVF) = 90 mL/hr    Nutrition Goals  Pt will tolerate TPN without refeeding syndrome    MONITORING AND EVALUATION:  Progress towards goals will be monitored and evaluated per protocol and Practice Guidelines    Wendi Swanson, RD, LD, [facility]

## 2018-03-06 NOTE — PLAN OF CARE
Problem: Patient Care Overview  Goal: Plan of Care/Patient Progress Review  Outcome: No Change  Tele ST with episodes of SVT, relieved Agitated and pulling on lines, given haldol. Oriented to person and occasionally place and situation. NG at 59 to LIS. BS hypo, denies flatus. Pain managed wit tylenol. NPO, given biotene for c/o dry mouth. Segal, borderline urine output, IV fluids increased. Up with Ax1

## 2018-03-06 NOTE — PLAN OF CARE
Problem: Patient Care Overview  Goal: Plan of Care/Patient Progress Review  Physical Therapy: Pt with recent RRT called and not appropriate for PT session this date.

## 2018-03-06 NOTE — PLAN OF CARE
"Problem: Patient Care Overview  Goal: Plan of Care/Patient Progress Review  Outcome: Improving  Patient alert to self. Patient keeps saying \" Pizza, Pizza,\" stating he thought he was at a pizza place. B/P systolic anywhere from the 80- low 100's systolic. FibHR 140-150 (converted to SR for a brief period of time but then flipped back into Afib). Tachypnea R- 30's, Lung sounds diminished. Right nasal trumpet intact (Respiratory suctioned patient during RRT). NG tube putting out brown/bile colored drainage. Bowel sounds hypoactive, not passing gas. Abdomen is soft. Incision JASON with steri strips. Adequate urine output from alba catheter. While PICC nurse was working with patient, he pulled at his EKG lead causing a skin tear to the right upper chest- steri strips applied and dressing. Skin tear also noted on right arm and left upper arm (steri strips applied and dressing)- Dr. Lauren updated of skin tears. Turn and repositioned every 2 hours and PRN. Mitts used for about 20 minutes then patient became agitated and took them off. Sitter at bedside. Patient denied pain, no facial grimacing noted. Patient started on heparin gtt 8.5ml/hr, Amiodarone gtt, Maintenance fluid at 50/hr and will start on TPN this Shayla. Wife is currently in the nursing home and was updated on patients condition. Patients daughter Prema also updated on patients status and will be in later to see patient.       "

## 2018-03-06 NOTE — PROGRESS NOTES
"Surgery    Increased oxygen needs overnight.   CT negative for PE, possible pneumonia.    Patient has no complaints  He is alert and verbally responsive to conversation, but mildly agitated.  Mitts are donned and sitter in room.    Blood pressure (!) 87/54, pulse 92, temperature 98.7  F (37.1  C), resp. rate (!) 36, height 1.753 m (5' 9\"), weight 74.8 kg (164 lb 14.5 oz), SpO2 96 %.  Resp - on mask o2.    Abdomen - soft, appropriately tender, non distended. Incisions ok.  Extremities - no lower extremity edema.    bmp with deranged results  UOP 550cc/24 hrs    NGT 2400cc/24hrs - dark, brown-reddish/bilious.    CT - upper abdomen with dilated loops of bowel.    A/P s/p SBO s/p TISHA with short segment small bowel resection on 3/4/18    - post op ileus   - await gi function  - continue ng.   - medical cares    Bryan Griffin PA-C  Office: [phone]  Pager: [phone]     "

## 2018-03-06 NOTE — PROGRESS NOTES
Surgery    Events overnight reviewed. Patient more delirious and lethargic today. NG tube with 800 of bilious output. Denies abdominal pain.    Abdomen-soft without significant tenderness    A/P  Events noted. Appreciate medical and cardiology care. Okay from surgical service to initiate heparin. Agree with TPN today. Awaiting bowel function.    Robel Gomez M.D.  [facility]  [phone]

## 2018-03-06 NOTE — PROVIDER NOTIFICATION
MD Notification    Notified Person:  MD    Notified Persons Name: Blair    Notification Date/Time: 3/6/18 0135    Notification Interaction:  Talked with Physician    Purpose of Notification: Hr 135-150    Orders Received: Stat ECG, PRN metoprolol 5mg IV    Comments:

## 2018-03-06 NOTE — SIGNIFICANT EVENT
During  RRT patient was given 1000 ml NS bolus per MD, Metoprolol held per MD orders due to low blood pressure. Patient started on a Cardizem gtt until Cardiology came to the floor and switch him to Amiodarone.

## 2018-03-06 NOTE — PROVIDER NOTIFICATION
Call placed to hospitalist regarding 's- 168 range, B/P soft, Tachypnea, CT results are back.       --- RRT called

## 2018-03-06 NOTE — PROVIDER NOTIFICATION
MD Notification    Notified Person:  MD    Notified Persons Name:     Notification Date/Time: 3/5/18 2241    Notification Interaction:  Talked with Physician    Purpose of Notification: BROOKE    Orders Received: continue to monitor    Comments:

## 2018-03-06 NOTE — PROGRESS NOTES
SW: SW acknowledges consult. Patient busy with other disciplines. SW to attempt to visit patient later today.    SW noted that patient has not been appropriate for therapies to see today theretofore no recommendations at this point.    SW to continue to follow and assist with discharge planning.

## 2018-03-06 NOTE — CODE/RAPID RESPONSE
[facility]    RRT Note  3/6/2018   Time Called: 0826    RRT called for: IV access, tachycardia, hypotension    Assessment & Plan     Atrial fibrillation with rapid ventricular response with associated hypotension possibly 2/2 dehydration in setting of recent SB surgery/NGT suctioning vs infectious vs hyperthyroidism vs less likely ACS.  Acute hypoxic respiratory insufficiency possibly 2/2 mucous plug vs new infiltrate vs volume overload vs less likely PE.   - Upon arrival, pt noted to have labored respirations with use of neck and abdominal muscle accessory usage.  Pt tachypneic with RR 40s.  Pt's initial O2 sats were in the 70s on 4L via oxymask; increased to 10L with adequate O2 sats noted (upper 90s).  Pt's telemetry also noting rapid a-fib with HR 150s-170s and BP 90s-low 100s.  At that time, no IV access was available.  Nasal trumpet placed by RT in R nare without complication, and NT suctioning noted clear/white secretions with subsequent dark tan secretions (possible mucous plug?).  Pt has a very weak cough.  Prior to suctioning, pt's lung sounds coarse, but improved with suctioning to clear throughout, diminished in bilaterally bases.  Pt became less labored, and RR ranging from 20s-30s.  IV access was obtained.  Pt was given a NS 1L bolus and started on diltiazem without bolus.  Pt's BP into the 80s with dilt initiation but subsequently >90.          INTERVENTIONS:  - Pt had a recent CT Chest this morning which noted bilateral lung opacities and no PE was noted.    - Pt's EKG this AM noted a-flutter RVR with variable AV block  - Flying squad placed 2 PIVs  - Changed 500 ml NS bolus to 1L NS bolus at 999 ml/hr  - Ordered stat ABG and lactic acid, add on troponin, magnesium, TSH  - Requested for NT suctioning  - Per primary hospitalist, consulted cardiology for rapid tachycardic rhythm, will defer ordering of echocardiogram, further rate control/conversion and anticoagulation to both primary  and cardiology  - Discussed with hospitalist regarding MIVF (had D5 ordered for hypernatremia, may exacerbate hypotension)  - Primary made pt IMC status with change in status, ordered procalcitonin and started pt on zosyn   - Continue to monitor respiratory status in setting of high normal pCO2 despite tachypnea.     Discussed with and defer further cares to nursing and Dr. Juventino Lauren.    Interval History     Mr. Joseluis Falcon is an 85-year-old male with a past medical history significant for psoriasis and bilateral inguinal herniorrhaphy, who presented to the ER 2/27/18 with complaints of pain in abdomen and vomiting and noted with a small bowel obstruction, s/p SB resection and TISHA on 3/4/18.     Code Status: Full Code    Pradeep Jose, JASMINA, CNP  House officer    This patient was independently evaluated by and discussed with JASMINA Espinoza, LUKE, house officer who agrees with the plans noted above.    Allergies   Allergies   Allergen Reactions     Seasonal Allergies      Protonix [Pantoprazole] Rash     Physical Exam   Vital Signs with Ranges:  Temp:  [97.3  F (36.3  C)-99  F (37.2  C)] 98.2  F (36.8  C)  Pulse:  [] 145  Heart Rate:  [] 75  Resp:  [16-40] 36  BP: ()/(48-67) 92/56  SpO2:  [92 %-97 %] 96 %  I/O last 3 completed shifts:  In: 2817.09 [P.O.:20; I.V.:2797.09]  Out: 2950 [Urine:550; Emesis/NG output:2400]    Constitutional: Pt lying in bed, appearing in moderate respiratory distress, minimally opens eyes to voice, following commands, temporal muscle wasting bilateral  Neck: Use of neck accessory muscle use with labored breathing  Pulmonary: In moderate distress with use of accessory muscles, coarse lung sounds bilaterally, clear post suctioning, diminished bilateral bases, no crackles or wheezes noted  Cardiovascular: Irregular, tachycardic rate and rhythm, normal S1S2, no murmur, rub or gallop noted  GI: Soft, nondistended, mild tenderness with palpation, surgical dressing and  bandaids CDI  Skin/Integumen: Warm and dry, thin  Neuro: Awakens to voice, follows commands x4 extremities, PERRL  Extremities: Moves all 4 extremities    Data     ABG:    Recent Labs  Lab 03/06/18  1006   PH 7.46*   PCO2 44   PO2 83   HCO3 32*     Troponin:    Recent Labs   Lab Test  03/06/18   0857   TROPI  <0.015     IMAGING: (X-ray/CT/MRI)   Recent Results (from the past 24 hour(s))   CT Chest Pulmonary Embolism w Contrast    Narrative    CT CHEST WITH CONTRAST  3/6/2018 5:00 AM     HISTORY: Recent surgery. Tachypnea and tachycardia.    COMPARISON: 2/27/2018 - CT abdomen and pelvis.    TECHNIQUE: Following the uneventful administration of 63 mL Isovue-370  intravenous contrast, helical sections were acquired through the lungs  according to the pulmonary embolism protocol. Coronal reconstructions  were generated. Radiation dose for this scan was reduced using  automated exposure control, adjustment of the mA and/or kV according  to the patient's size, or iterative reconstruction technique.    FINDINGS: No visualized pulmonary embolus. The thoracic aorta is  normal in caliber. Atherosclerotic calcification in the thoracic aorta  and coronary arteries.    Mild emphysematous changes in the lungs. Mildly extensive irregular  interstitial opacities in the periphery of both lungs. Honeycombing is  present in the periphery of the lungs and associated with a few areas  of aforementioned interstitial thickening. A few small ill-defined  hazy opacities are present in bilateral lung bases, for example in the  central aspect of the right lower lobe (series 5 image 75). No pleural  or pericardial effusion. A few nonspecific borderline enlarged  mediastinal lymph nodes. An enteric drainage tube is present with  distal tip in the gastric body.    Scan through the upper abdomen is significant for partial  visualization of several loops of fluid-filled mildly dilated small  bowel in the upper abdomen, a small amount of  perihepatic free fluid,  and a small amount of extraluminal gas in the upper abdomen.      Impression    IMPRESSION:  1. A few small ill-defined hazy opacities in bilateral lung bases are  nonspecific, but could represent pneumonia.  2. Mildly extensive irregular interstitial opacities and some  associated honeycombing in the periphery of both lungs consistent with  fibrosis, possibly usual interstitial pneumonia.  3. No visualized pulmonary embolus.  4. Partial visualization of a few loops of fluid-filled mildly dilated  small bowel in the upper abdomen. A small bowel obstruction is  possible.  5. A small amount of extraluminal gas in the upper abdomen. Per  report, the patient had an exploratory laparoscopy with lysis of  adhesions on 3/4/2018, explaining this finding.    CHARBEL WEI MD     CBC with Diff:  Recent Labs   Lab Test  03/06/18   0335   02/27/18   1219   WBC  7.1   < >  12.6*   HGB  13.5   < >  15.4   MCV  103*   < >  98   PLT  147*   < >  154   INR   --    --   1.02    < > = values in this interval not displayed.      Lactic Acid:    Lab Results   Component Value Date    LACT 1.4 03/06/2018         Comprehensive Metabolic Panel:    Recent Labs  Lab 03/06/18  0857 03/06/18  0335  02/27/18  1219   NA  --  153*  < > 139   POTASSIUM  --  4.1  < > 3.6   CHLORIDE  --  116*  < > 100   CO2  --  34*  < > 31   ANIONGAP  --  3  < > 8   GLC  --  140*  < > 168*   BUN  --  31*  < > 25   CR  --  1.37*  < > 1.21   GFRESTIMATED  --  49*  < > 57*   GFRESTBLACK  --  60*  < > 69   TOBI  --  8.2*  < > 8.9   MAG 2.6*  --   < >  --    PROTTOTAL  --   --   --  7.1   ALBUMIN  --   --   --  3.7   BILITOTAL  --   --   --  1.3   ALKPHOS  --   --   --  97   AST  --   --   --  13   ALT  --   --   --  16   < > = values in this interval not displayed.    Time Spent on this Encounter   I spent 45 minutes on the unit/floor managing the care of Joseluis Falcon. Over 50% of my time was spent counseling the patient and/or coordinating care  regarding services listed in this note.

## 2018-03-06 NOTE — PROGRESS NOTES
RRT Nt suction with trumpet on right nares per MD, BBS coarse, Spo2 98% on 10 lpm OXIMASK. ABG sent. Will follow up.3/6/2018  Jim Pimentel

## 2018-03-06 NOTE — PROGRESS NOTES
[facility]    Internal Medicine Hospitalist Progress Note  03/06/2018  I evaluated patient on the above date.    Juventino Lauren Jr., MD  [phone] (p)  Text Page (7 am to 6 pm)      Assessment & Plan   Mr. Joseluis Falcon is an 85-year-old male with a past medical history significant for psoriasis and bilateral inguinal herniorrhaphy, who presented 2/27 with complaints of pain in abdomen and vomiting and noted with a small bowel obstruction.     1. SBO - s/p laparoscopic TISHA and lap-assisted SB resection 3/4/2018.  Hx of bilateral inguinal herniorrhaphy. CT 2/27 showed proximal SBO with abnormal wall thickening of the mid SB loops. NG placed 3/1. Challenged with Gastrografin 3/3 and axr showed some contrast in proximal R colon and AFL, with some suggestion of adynamic ileus rather than obstruction. X-rays 3/4 showed increasing SB distension. Taken to surgery 3/4/2018.  - Post-op management per Surgery.  - Start TPN.  - Continue IVF's.  - Continue PRN IV acetaminophen, PRN IV Dilaudid (minimize opioids as able).  - Ambulate as able.  - Appreciate help from Surgery.    2. Narrow complex tachycardia with soft BP's/mild hypotension.  Pt with periods of narrow complex tachycardia noted 3/5; started on scheduled IV metoprolol. Early am 3/6 persistently tachycardic, appeared to be afib/flutter; pt tachypneic, though denied chest pain. BP's in 80's to 90's systolic. CT chest 3/6 no PE, some ill defined hazy opacities bilateral lung bases non-specific, could be pneumonia; mildly extensive irregular interstitial opacities and some associated honeycombing in the periphery of both lungs consistent with fibrosis, possibly usual interstitial pneumonia.  - Give 500 ml NS bolus.  - Give metoprolol 5 mg IV x 1.  - Start diltiazem gtt.  - Cardiology consult, appreciate help.    3. Lung infiltrates on CT.  Pt with CT chest 3/6 as above. No cough. Pt afebrile; lactic acid 3/6 negative; WBC 3/6 normal.   - Start Zosyn for  now.  - Check procalcitonin.    4. Hypernatremia due to hypertonic fluids and decreased PO intake.   IVF's changed from NS to D5 1/2 NS 3/5.   Recent Labs  Lab 03/06/18  0335 03/05/18  0813 03/02/18  0810 03/01/18  0736 02/28/18  0743 02/27/18  1219   * 152* 143 139 139 139   - Change from D5 1/2 NS with 20 meq KCl to D5 water at 110 ml/hr.  - Monitor BMP.    5. Acute kidney injury, stage 1, suspect prerenal.  Cr up to 1.30 on 3/1.   Recent Labs  Lab 03/06/18  0335 03/05/18  0813 03/02/18  0810 03/01/18  0736 02/28/18  0743 02/27/18  1219   CR 1.37* 1.15 1.25 1.30* 1.09 1.21   - Continue IVF's as above.  - Monitor BMP.  - Avoid nephrotoxic medications.    6. Thrombocytopenia.  Unclear etiology; possibly medication effect or chronic from other cause.   Recent Labs  Lab 03/06/18  0335 03/05/18  0813 02/28/18  0743 02/27/18  1219   * 144* 133* 154   - Monitor CBC.  - Consider platelet transfusion if needs a procedure and less than 50,000; or if less than 10,000.    7. Acute delirium.      Recent memory issues prior to admission.  Per pt's daughter, pt with worsened memory over the past few months; coincided with wife falling and breaking her hip. This hospitalization, has been frequently confused and agitated; at times requiring Haldol and Zyprexa.  - Continue Seroquel 12.5 mg qHS as able.  - Continue PRN Zyprexa and PRN Haldol.    8. Psoriasis.  Pt's daughter notes pt was on med for psoriasis last year which contributed to pt losing 220 lbs to 160 lbs over the course of the last 1.5 years; off this med for several months PTA.  - Continue topical meds.    Prophylaxis.  - PCD's, ambulation.  - Add IV ranitidine.    CODE STATUS: FULL.    Dispo.  - Pending above.    I spent approximately 45 minutes bedside and on the inpatient unit today managing the care of patient. Over 50% of my time on the unit was spent counseling the patient and/or coordinating care regarding services listed in this note.    Critical  "care time: 45 minutes.    ADDENDUM 1348:  I d/w pt's daughter; at the time of admission, she had discussed with pt and he had wanted to full cares and cardio-pulmonary resuscitation if needed; as such, pt remains FULL CODE. However, pending results of echo and other clinical findings, will re-discuss if there is a further significant deterioration with little chance for meaningful recovery.     I discussed plan of care with EP, Dr. Malloy; plan amiodarone gtt for rapid afib. Greatly appreciate his help.    Interval History   t with periods of narrow complex tachycardia noted 3/5; started on scheduled IV metoprolol. Early this am, persistently tachycardic, appeared to be afib/flutter; pt tachypneic, though denied chest pain. BP's in 80's to 90's systolic. Pt presently confused, but denies chest pain.    -Data reviewed today: I reviewed all new labs and imaging over the last 24 hours. I personally reviewed the EKG tracing showing narrow complex tachy, possibly afib with RVR, wtih ST-T wave abnormalities.    Physical Exam   Heart Rate: 75, Blood pressure 92/56, pulse 145, temperature 98.2  F (36.8  C), temperature source Axillary, resp. rate (!) 36, height 1.753 m (5' 9\"), weight 74.8 kg (164 lb 14.5 oz), SpO2 96 %.  Vitals:    02/27/18 1212 02/28/18 0700   Weight: 74.8 kg (165 lb) 74.8 kg (164 lb 14.5 oz)     Vital Signs with Ranges  Temp:  [97.3  F (36.3  C)-99  F (37.2  C)] 98.2  F (36.8  C)  Pulse:  [] 145  Heart Rate:  [] 75  Resp:  [16-40] 36  BP: ()/(48-67) 92/56  SpO2:  [92 %-97 %] 96 %  Patient Vitals for the past 24 hrs:   BP Temp Temp src Pulse Heart Rate Resp SpO2   03/06/18 0756 92/56 - - - - - -   03/06/18 0752 (!) 89/48 98.2  F (36.8  C) Axillary 145 - (!) 36 96 %   03/06/18 0606 (!) 87/54 - - - 75 (!) 36 96 %   03/06/18 0341 100/66 98.7  F (37.1  C) - - 128 (!) 40 97 %   03/06/18 0320 92/58 99  F (37.2  C) Axillary - 145 (!) 40 97 %   03/06/18 0300 - - - - - (!) 36 96 %   03/06/18 0227 " - - - - - - 97 %   03/06/18 0226 - - - - - - 97 %   03/06/18 0208 (!) 88/58 - - - 80 (!) 38 92 %   03/06/18 0131 100/64 - - - 88 - -   03/05/18 2337 98/59 97.5  F (36.4  C) Oral 92 - - 92 %   03/05/18 2215 110/62 - - 85 - - -   03/05/18 2116 98/63 - - - 90 - -   03/05/18 1945 98/62 97.3  F (36.3  C) Oral - 90 16 92 %   03/05/18 1612 - - - 88 - - -   03/05/18 1526 105/67 97.8  F (36.6  C) Oral 96 - 16 92 %   03/05/18 1048 - - - - - - 92 %     I/O's Last 24 hours  I/O last 3 completed shifts:  In: 2817.09 [P.O.:20; I.V.:2797.09]  Out: 2950 [Urine:550; Emesis/NG output:2400]    Constitutional: Fatigued; respondsto questions.  Respiratory: Diminished in bases. No crackles or wheezes.  Cardiovascular: Tachy and irregular at times, no m/r/g.  GI: Mild distension, hypoactive, some tenderness, no r/g.  Skin/Integumen:Bbilateral lower extremity no edema.  Other:        Data     Recent Labs  Lab 03/06/18  0335 03/05/18  0813 03/04/18  0800 03/02/18  0810  02/28/18  0743 02/27/18  1219   WBC 7.1 5.1  --   --   --  5.0 12.6*   HGB 13.5 13.5 14.1  --   --  14.1 15.4   * 102*  --   --   --  99 98   * 144*  --   --   --  133* 154   INR  --   --   --   --   --   --  1.02   * 152*  --  143  < > 139 139   POTASSIUM 4.1 4.3  --  4.6  < > 4.3 3.6   CHLORIDE 116* 119*  --  111*  < > 106 100   CO2 34* 30  --  26  < > 28 31   BUN 31* 32*  --  36*  < > 27 25   CR 1.37* 1.15  --  1.25  < > 1.09 1.21   ANIONGAP 3 3  --  6  < > 5 8   TOBI 8.2* 8.0*  --  8.4*  < > 8.5 8.9   * 114*  --  166*  < > 130* 168*   ALBUMIN  --   --   --   --   --   --  3.7   PROTTOTAL  --   --   --   --   --   --  7.1   BILITOTAL  --   --   --   --   --   --  1.3   ALKPHOS  --   --   --   --   --   --  97   ALT  --   --   --   --   --   --  16   AST  --   --   --   --   --   --  13   LIPASE  --   --   --   --   --   --  92   TROPI  --   --   --   --   --   --  <0.015   < > = values in this interval not displayed.  Recent Labs   Lab Test   03/06/18   0335  03/05/18   0813  03/02/18   0810  03/01/18   0736  02/28/18   0743   GLC  140*  114*  166*  127*  130*         Recent Results (from the past 24 hour(s))   CT Chest Pulmonary Embolism w Contrast    Narrative    CT CHEST WITH CONTRAST  3/6/2018 5:00 AM     HISTORY: Recent surgery. Tachypnea and tachycardia.    COMPARISON: 2/27/2018 - CT abdomen and pelvis.    TECHNIQUE: Following the uneventful administration of 63 mL Isovue-370  intravenous contrast, helical sections were acquired through the lungs  according to the pulmonary embolism protocol. Coronal reconstructions  were generated. Radiation dose for this scan was reduced using  automated exposure control, adjustment of the mA and/or kV according  to the patient's size, or iterative reconstruction technique.    FINDINGS: No visualized pulmonary embolus. The thoracic aorta is  normal in caliber. Atherosclerotic calcification in the thoracic aorta  and coronary arteries.    Mild emphysematous changes in the lungs. Mildly extensive irregular  interstitial opacities in the periphery of both lungs. Honeycombing is  present in the periphery of the lungs and associated with a few areas  of aforementioned interstitial thickening. A few small ill-defined  hazy opacities are present in bilateral lung bases, for example in the  central aspect of the right lower lobe (series 5 image 75). No pleural  or pericardial effusion. A few nonspecific borderline enlarged  mediastinal lymph nodes. An enteric drainage tube is present with  distal tip in the gastric body.    Scan through the upper abdomen is significant for partial  visualization of several loops of fluid-filled mildly dilated small  bowel in the upper abdomen, a small amount of perihepatic free fluid,  and a small amount of extraluminal gas in the upper abdomen.      Impression    IMPRESSION:  1. A few small ill-defined hazy opacities in bilateral lung bases are  nonspecific, but could represent  pneumonia.  2. Mildly extensive irregular interstitial opacities and some  associated honeycombing in the periphery of both lungs consistent with  fibrosis, possibly usual interstitial pneumonia.  3. No visualized pulmonary embolus.  4. Partial visualization of a few loops of fluid-filled mildly dilated  small bowel in the upper abdomen. A small bowel obstruction is  possible.  5. A small amount of extraluminal gas in the upper abdomen. Per  report, the patient had an exploratory laparoscopy with lysis of  adhesions on 3/4/2018, explaining this finding.    CHARBEL WEI MD       Medications   All medications were reviewed.    diltiazem (CARDIZEM) infusion ADULT       D5W         sodium chloride 0.9%  500 mL Intravenous Once     metoprolol  5 mg Intravenous Once     sodium chloride (PF)  3 mL Intracatheter Q8H     metoprolol  2.5 mg Intravenous Q6H     QUEtiapine  12.5 mg Oral At Bedtime     OLANZapine  5 mg Intramuscular Once

## 2018-03-06 NOTE — PLAN OF CARE
Problem: Patient Care Overview  Goal: Plan of Care/Patient Progress Review  OT: per nursing pt not appropriate for OT at this time, continues with high HR

## 2018-03-06 NOTE — PLAN OF CARE
Problem: Patient Care Overview  Goal: Plan of Care/Patient Progress Review  Alert to self. Tele AFib/flutter overnight. PRN and scheduled metoprolol given. On 5L for tachypnea. Soft BPs. Sent for CT to r/o PE. BS hypo, -flatus. Denies pain. Up with 1-2 assist. Segal in place, MD BROOKE aware. NG in place with large output. Dressing CDI. NPO, oral cares done. IVF infusing.

## 2018-03-06 NOTE — PLAN OF CARE
Problem: Patient Care Overview  Goal: Plan of Care/Patient Progress Review  PT: Spoke with RN, pt currently with HR in 140-150s aflutter-not appropriate at this time.

## 2018-03-06 NOTE — CONSULTS
Consult Date:  03/06/2018      CARDIAC ELECTROPHYSIOLOGY CONSULTATION       REQUESTING PHYSICIAN:  Juventino Lauren Hospitalist.        REASON FOR CONSULTATION:  Atrial fibrillation/flutter with rapid ventricular response.      HISTORY OF PRESENT ILLNESS:    It is my pleasure seeing Mr. Joseluis Falcon in EP consultation.  This 85-year-old gentleman is critically ill at this point.  He was admitted on 02/27/2018 with abdominal pain and vomiting.  He was diagnosed with small-bowel obstruction.  He was initially treated conservatively but due to lack of improvement he underwent laparoscopic lysis of adhesions with partial small bowel resection on 03/04/2018.  Around the time of surgery he had a run of supraventricular tachycardia.      Earlier today the patient developed rapid atrial fibrillation in the 160s.  At some point his ECG was consistent with atrial flutter.  Heart rate was as high as 160.  He was placed on intravenous diltiazem but that resulted in severe hypotension.  By the time I saw the patient on station 33 this am his blood pressure was only 70.  The iv diltiazem was discontinued.      Unfortunately, the patient is very confused this am.  As far as I can tell he did not have previous cardiac history.  There are several metabolic abnormalities, most notably a sodium level 153.  His RN told me he was confused even before his sodium level became that high.  He denies chest pain but does appear dyspneic.        DIAGNOSTIC STUDIES:    Sodium 153, potassium 4.1, creatinine 1.37.  Lactic acid 1.4.  TSH 1.72.  Troponin less than 0.01.  White count 7100, hematocrit 43%, platelets 147,000.      His 12-lead ECG shows either atrial fibrillation or atrial flutter (both arrhythmias have been documented) with RVR as high as 160 beats per minute.  There is a right bundle branch block on all ECGs. I note on an ECG on admission 02/27/2017 there was a hint of ST elevation in the inferior leads.  There is also evidence of Q-waves  "there.  I do not see ST elevation currently.     Echocardiogram was ordered and results are pending.     The patient had CT with contrast earlier today because of tachypnea and hypoxia.  There were mild but extensive irregular interstitial opacities and some associated honeycombing.  No pulmonary emboli.      Echocardiogram has been ordered and results are pending.        IMPRESSION:    1.  Atrial fibrillation/atrial flutter with very rapid ventricular response.  Mr. Falcon is critically ill.  He presented with small-bowel obstruction and underwent a surgical procedure on 03/04/2018, see above.  He is currently hypotensive, tachypneic and tachycardic.        RECOMMENDATIONS:   1.  Stop intravenous diltiazem due to severe hypotension.   2.  Give IV digoxin 0.25 mg immediately and reassess later for a possible second dose.   3.  Start amiodarone with very slow initial infusion/bolus followed by a continuous infusion.  There is a chance amiodarone will convert the patient.  If not, we would consider cardioversion tomorrow.   4.  Echocardiogram was ordered and pending.   5.  Chest x-ray was ordered and pending.  The patient is tachypneic and could be in pulmonary edema at this time.   6.  Communicate with General Surgery whether it is acceptable to use intravenous heparin (without bolus).         Critical care time spent was 45 minutes.         MAXIMUS CARDENAS MD, [facility]           Physical Exam:  Vitals: /73 (BP Location: Left leg)  Pulse 145  Temp 98.8  F (37.1  C)  Resp (!) 32  Ht 1.753 m (5' 9\")  Wt 74.8 kg (164 lb 14.5 oz)  SpO2 91%  BMI 24.35 kg/m2  Intake/Output Summary (Last 24 hours) at 03/06/18 1456  Last data filed at 03/06/18 1254   Gross per 24 hour   Intake          1964.29 ml   Output             3475 ml   Net         -1510.71 ml     Vitals:    02/27/18 1212 02/28/18 0700   Weight: 74.8 kg (165 lb) 74.8 kg (164 lb 14.5 oz)     Constitutional:  Alert but disoriented.  No relatives in room. "   HEAD: Normocephalic.  Cachectic appearing elderly male, dyspneic.  SKIN: Skin normal color, texture and turgor with no lesions or eruptions.  Eyes: PERRL, EOMI.  ENT:  Supple, normal JVP. No lymphadenopathy or thyroid enlargement.  Chest:  Decreased breath sounds biltaerally.  Pt is only taking shallow breaths.   Cardiac:  irreg and very tachy, summation gallop.  Abdomen:  Decreased BS.   Extremities:  Pedious pulses palpable B/L.  No LE edema noticed.   Neurological:  Confused.        Review of Systems:  Complete review of system could not be completed d/t confusion.     CURRENT MEDICATIONS:    sodium chloride 0.9%  500 mL Intravenous Once     metoprolol  5 mg Intravenous Once     sodium chloride (PF)  3 mL Intracatheter Q8H     piperacillin-tazobactam  3.375 g Intravenous Q6H     sodium chloride (PF)  10 mL Intracatheter Q8H     amiodarone  150 mg Intravenous Once     famotidine  20 mg Intravenous Q24H     lipids  250 mL Intravenous Every Other Day     [START ON 3/7/2018] insulin aspart  1-12 Units Subcutaneous Q4H     QUEtiapine  12.5 mg Oral At Bedtime     OLANZapine  5 mg Intramuscular Once     ALLERGIES  Allergies   Allergen Reactions     Seasonal Allergies      Protonix [Pantoprazole] Rash       PAST MEDICAL HISTORY:  Past Medical History:   Diagnosis Date     Arthritis      Carlson's esophagus      GERD (gastroesophageal reflux disease)      Hard of hearing      Psoriasis      Skin cancer        PAST SURGICAL HISTORY:  Past Surgical History:   Procedure Laterality Date     HERNIORRHAPHY INGUINAL Right 12/1/2015    Procedure: HERNIORRHAPHY INGUINAL;  Surgeon: Malcom Rasmussen MD;  Location:  [facility]     HERNIORRHAPHY INGUINAL Left 6/9/2017    Procedure: HERNIORRHAPHY INGUINAL;  OPEN LEFT INGUINAL HERNIA REPAIR WITH MESH ;  Surgeon: Aden Haro MD;  Location:  OR     LAPAROSCOPIC LYSIS ADHESIONS N/A 3/4/2018    Procedure: LAPAROSCOPIC LYSIS ADHESIONS;;  Surgeon: Robel Gomez MD;   Location: [facility] OR     LAPAROSCOPIC RESECTION SMALL BOWEL N/A 3/4/2018    Procedure: LAPAROSCOPIC RESECTION SMALL BOWEL;;  Surgeon: Robel Gomez MD;  Location: [facility] OR     LAPAROSCOPY DIAGNOSTIC (GENERAL) N/A 3/4/2018    Procedure: LAPAROSCOPY DIAGNOSTIC (GENERAL);  EXPLORATORY LAPAROSCOPY LINDA LYSIS OF ADHESION; SMALL BOWEL RESECTION;  Surgeon: Robel Gomez MD;  Location: [facility] OR       FAMILY HISTORY:  History reviewed. No pertinent family history.    SOCIAL HISTORY:  Social History     Social History     Marital status:      Spouse name: N/A     Number of children: N/A     Years of education: N/A     Social History Main Topics     Smoking status: Former Smoker     Quit date: 2/3/1964     Smokeless tobacco: Never Used     Alcohol use Yes      Comment: 1 drink daily     Drug use: None     Sexual activity: Not Asked     Other Topics Concern     None     Social History Narrative         Recent Labs  Lab 18  0857 18  0335 18  0813 18  0800 18  0810  18  0743   WBC  --  7.1 5.1  --   --   --  5.0   HGB  --  13.5 13.5 14.1  --   --  14.1   MCV  --  103* 102*  --   --   --  99   PLT  --  147* 144*  --   --   --  133*   NA  --  153* 152*  --  143  < > 139   POTASSIUM  --  4.1 4.3  --  4.6  < > 4.3   CHLORIDE  --  116* 119*  --  111*  < > 106   CO2  --  34* 30  --  26  < > 28   BUN  --  31* 32*  --  36*  < > 27   CR  --  1.37* 1.15  --  1.25  < > 1.09   ANIONGAP  --  3 3  --  6  < > 5   TOBI  --  8.2* 8.0*  --  8.4*  < > 8.5   GLC  --  140* 114*  --  166*  < > 130*   TROPI <0.015  --   --   --   --   --   --    < > = values in this interval not displayed.         D: 2018   T: 2018   MT: JIGNESH      Name:     ISRAEL PERDOMO   MRN:      [id number]        Account:       [id number]   :      1932           Consult Date:  2018      Document: [id number]       cc: Danny Diop MD

## 2018-03-07 ENCOUNTER — APPOINTMENT (OUTPATIENT)
Dept: GENERAL RADIOLOGY | Facility: CLINIC | Age: 83
DRG: 329 | End: 2018-03-07
Attending: INTERNAL MEDICINE
Payer: COMMERCIAL

## 2018-03-07 ENCOUNTER — ANESTHESIA EVENT (OUTPATIENT)
Dept: INTENSIVE CARE | Facility: CLINIC | Age: 83
DRG: 329 | End: 2018-03-07
Payer: COMMERCIAL

## 2018-03-07 ENCOUNTER — ANESTHESIA (OUTPATIENT)
Dept: INTENSIVE CARE | Facility: CLINIC | Age: 83
DRG: 329 | End: 2018-03-07
Payer: COMMERCIAL

## 2018-03-07 PROBLEM — E46 PROTEIN-CALORIE MALNUTRITION (H): Status: ACTIVE | Noted: 2018-03-07

## 2018-03-07 LAB
ALBUMIN SERPL-MCNC: 2.1 G/DL (ref 3.4–5)
ALP SERPL-CCNC: 51 U/L (ref 40–150)
ALT SERPL W P-5'-P-CCNC: 12 U/L (ref 0–70)
ANION GAP SERPL CALCULATED.3IONS-SCNC: 2 MMOL/L (ref 3–14)
AST SERPL W P-5'-P-CCNC: 18 U/L (ref 0–45)
BASE DEFICIT BLDA-SCNC: NORMAL MMOL/L
BASE EXCESS BLDA CALC-SCNC: 5.3 MMOL/L
BASE EXCESS BLDA CALC-SCNC: 5.5 MMOL/L
BASE EXCESS BLDA CALC-SCNC: NORMAL MMOL/L
BASE EXCESS BLDV CALC-SCNC: 4.7 MMOL/L
BASOPHILS # BLD AUTO: 0 10E9/L (ref 0–0.2)
BASOPHILS NFR BLD AUTO: 0.4 %
BILIRUB DIRECT SERPL-MCNC: 0.3 MG/DL (ref 0–0.2)
BILIRUB SERPL-MCNC: 1 MG/DL (ref 0.2–1.3)
BUN SERPL-MCNC: 27 MG/DL (ref 7–30)
CALCIUM SERPL-MCNC: 7.5 MG/DL (ref 8.5–10.1)
CHLORIDE SERPL-SCNC: 115 MMOL/L (ref 94–109)
CO2 SERPL-SCNC: 32 MMOL/L (ref 20–32)
CREAT SERPL-MCNC: 1.25 MG/DL (ref 0.66–1.25)
DIFFERENTIAL METHOD BLD: ABNORMAL
EOSINOPHIL # BLD AUTO: 0.4 10E9/L (ref 0–0.7)
EOSINOPHIL NFR BLD AUTO: 5.2 %
ERYTHROCYTE [DISTWIDTH] IN BLOOD BY AUTOMATED COUNT: 12.9 % (ref 10–15)
GFR SERPL CREATININE-BSD FRML MDRD: 55 ML/MIN/1.7M2
GLUCOSE BLDC GLUCOMTR-MCNC: 115 MG/DL (ref 70–99)
GLUCOSE BLDC GLUCOMTR-MCNC: 120 MG/DL (ref 70–99)
GLUCOSE BLDC GLUCOMTR-MCNC: 123 MG/DL (ref 70–99)
GLUCOSE BLDC GLUCOMTR-MCNC: 132 MG/DL (ref 70–99)
GLUCOSE BLDC GLUCOMTR-MCNC: 132 MG/DL (ref 70–99)
GLUCOSE BLDC GLUCOMTR-MCNC: 135 MG/DL (ref 70–99)
GLUCOSE BLDC GLUCOMTR-MCNC: 136 MG/DL (ref 70–99)
GLUCOSE BLDC GLUCOMTR-MCNC: 137 MG/DL (ref 70–99)
GLUCOSE BLDC GLUCOMTR-MCNC: 149 MG/DL (ref 70–99)
GLUCOSE BLDC GLUCOMTR-MCNC: 149 MG/DL (ref 70–99)
GLUCOSE BLDC GLUCOMTR-MCNC: 161 MG/DL (ref 70–99)
GLUCOSE BLDC GLUCOMTR-MCNC: 176 MG/DL (ref 70–99)
GLUCOSE SERPL-MCNC: 156 MG/DL (ref 70–99)
HCO3 BLD-SCNC: 29 MMOL/L (ref 21–28)
HCO3 BLD-SCNC: 29 MMOL/L (ref 21–28)
HCO3 BLD-SCNC: NORMAL MMOL/L (ref 21–28)
HCO3 BLDV-SCNC: 33 MMOL/L (ref 21–28)
HCT VFR BLD AUTO: 40.6 % (ref 40–53)
HGB BLD-MCNC: 12.4 G/DL (ref 13.3–17.7)
IMM GRANULOCYTES # BLD: 0 10E9/L (ref 0–0.4)
IMM GRANULOCYTES NFR BLD: 0.4 %
INR PPP: 1.23 (ref 0.86–1.14)
LACTATE BLD-SCNC: 1 MMOL/L (ref 0.7–2)
LMWH PPP CHRO-ACNC: <0.1 IU/ML
LYMPHOCYTES # BLD AUTO: 0.8 10E9/L (ref 0.8–5.3)
LYMPHOCYTES NFR BLD AUTO: 11 %
MAGNESIUM SERPL-MCNC: 2.3 MG/DL (ref 1.6–2.3)
MCH RBC QN AUTO: 32.1 PG (ref 26.5–33)
MCHC RBC AUTO-ENTMCNC: 30.5 G/DL (ref 31.5–36.5)
MCV RBC AUTO: 105 FL (ref 78–100)
MONOCYTES # BLD AUTO: 0.5 10E9/L (ref 0–1.3)
MONOCYTES NFR BLD AUTO: 6.3 %
NEUTROPHILS # BLD AUTO: 5.9 10E9/L (ref 1.6–8.3)
NEUTROPHILS NFR BLD AUTO: 76.7 %
NRBC # BLD AUTO: 0 10*3/UL
NRBC BLD AUTO-RTO: 0 /100
O2/TOTAL GAS SETTING VFR VENT: ABNORMAL %
O2/TOTAL GAS SETTING VFR VENT: NORMAL %
OXYHGB MFR BLD: 98 % (ref 92–100)
OXYHGB MFR BLD: 99 % (ref 92–100)
OXYHGB MFR BLD: NORMAL % (ref 92–100)
OXYHGB MFR BLDV: 73 %
PCO2 BLD: 37 MM HG (ref 35–45)
PCO2 BLD: 39 MM HG (ref 35–45)
PCO2 BLD: NORMAL MM HG (ref 35–45)
PCO2 BLDV: 68 MM HG (ref 40–50)
PH BLD: 7.49 PH (ref 7.35–7.45)
PH BLD: 7.5 PH (ref 7.35–7.45)
PH BLD: NORMAL PH (ref 7.35–7.45)
PH BLDV: 7.3 PH (ref 7.32–7.43)
PHOSPHATE SERPL-MCNC: 4.1 MG/DL (ref 2.5–4.5)
PLATELET # BLD AUTO: 131 10E9/L (ref 150–450)
PO2 BLD: 149 MM HG (ref 80–105)
PO2 BLD: 172 MM HG (ref 80–105)
PO2 BLD: NORMAL MM HG (ref 80–105)
PO2 BLDV: 44 MM HG (ref 25–47)
POTASSIUM SERPL-SCNC: 4.1 MMOL/L (ref 3.4–5.3)
PREALB SERPL IA-MCNC: 4 MG/DL (ref 15–45)
PROCALCITONIN SERPL-MCNC: 0.61 NG/ML
PROT SERPL-MCNC: 5.3 G/DL (ref 6.8–8.8)
RBC # BLD AUTO: 3.86 10E12/L (ref 4.4–5.9)
SODIUM SERPL-SCNC: 149 MMOL/L (ref 133–144)
TROPONIN I SERPL-MCNC: 0.02 UG/L (ref 0–0.04)
WBC # BLD AUTO: 7.7 10E9/L (ref 4–11)

## 2018-03-07 PROCEDURE — 82248 BILIRUBIN DIRECT: CPT | Performed by: INTERNAL MEDICINE

## 2018-03-07 PROCEDURE — 5A1955Z RESPIRATORY VENTILATION, GREATER THAN 96 CONSECUTIVE HOURS: ICD-10-PCS | Performed by: FAMILY MEDICINE

## 2018-03-07 PROCEDURE — 82805 BLOOD GASES W/O2 SATURATION: CPT | Performed by: INTERNAL MEDICINE

## 2018-03-07 PROCEDURE — 25000128 H RX IP 250 OP 636

## 2018-03-07 PROCEDURE — 20000003 ZZH R&B ICU

## 2018-03-07 PROCEDURE — 82805 BLOOD GASES W/O2 SATURATION: CPT | Performed by: FAMILY MEDICINE

## 2018-03-07 PROCEDURE — 25000125 ZZHC RX 250: Performed by: INTERNAL MEDICINE

## 2018-03-07 PROCEDURE — 25000128 H RX IP 250 OP 636: Performed by: NURSE ANESTHETIST, CERTIFIED REGISTERED

## 2018-03-07 PROCEDURE — 93005 ELECTROCARDIOGRAM TRACING: CPT

## 2018-03-07 PROCEDURE — 00000146 ZZHCL STATISTIC GLUCOSE BY METER IP

## 2018-03-07 PROCEDURE — 36600 WITHDRAWAL OF ARTERIAL BLOOD: CPT

## 2018-03-07 PROCEDURE — 25000128 H RX IP 250 OP 636: Performed by: INTERNAL MEDICINE

## 2018-03-07 PROCEDURE — 85520 HEPARIN ASSAY: CPT | Performed by: INTERNAL MEDICINE

## 2018-03-07 PROCEDURE — 85610 PROTHROMBIN TIME: CPT | Performed by: INTERNAL MEDICINE

## 2018-03-07 PROCEDURE — 93010 ELECTROCARDIOGRAM REPORT: CPT | Performed by: INTERNAL MEDICINE

## 2018-03-07 PROCEDURE — 40000239 ZZH STATISTIC VAT ROUNDS

## 2018-03-07 PROCEDURE — 25000128 H RX IP 250 OP 636: Performed by: HOSPITALIST

## 2018-03-07 PROCEDURE — 25000132 ZZH RX MED GY IP 250 OP 250 PS 637: Performed by: INTERNAL MEDICINE

## 2018-03-07 PROCEDURE — 99207 ZZC NON-BILLABLE SERV PER CHARTING: CPT | Performed by: INTERNAL MEDICINE

## 2018-03-07 PROCEDURE — 83735 ASSAY OF MAGNESIUM: CPT | Performed by: INTERNAL MEDICINE

## 2018-03-07 PROCEDURE — 40000008 ZZH STATISTIC AIRWAY CARE

## 2018-03-07 PROCEDURE — 83605 ASSAY OF LACTIC ACID: CPT | Performed by: INTERNAL MEDICINE

## 2018-03-07 PROCEDURE — 40000275 ZZH STATISTIC RCP TIME EA 10 MIN

## 2018-03-07 PROCEDURE — 84100 ASSAY OF PHOSPHORUS: CPT | Performed by: INTERNAL MEDICINE

## 2018-03-07 PROCEDURE — 84484 ASSAY OF TROPONIN QUANT: CPT | Performed by: INTERNAL MEDICINE

## 2018-03-07 PROCEDURE — 94002 VENT MGMT INPAT INIT DAY: CPT

## 2018-03-07 PROCEDURE — 99292 CRITICAL CARE ADDL 30 MIN: CPT | Performed by: INTERNAL MEDICINE

## 2018-03-07 PROCEDURE — 99291 CRITICAL CARE FIRST HOUR: CPT | Performed by: INTERNAL MEDICINE

## 2018-03-07 PROCEDURE — 84145 PROCALCITONIN (PCT): CPT | Performed by: INTERNAL MEDICINE

## 2018-03-07 PROCEDURE — 85025 COMPLETE CBC W/AUTO DIFF WBC: CPT | Performed by: INTERNAL MEDICINE

## 2018-03-07 PROCEDURE — 25000131 ZZH RX MED GY IP 250 OP 636 PS 637: Performed by: INTERNAL MEDICINE

## 2018-03-07 PROCEDURE — 25000128 H RX IP 250 OP 636: Performed by: PHYSICIAN ASSISTANT

## 2018-03-07 PROCEDURE — 84134 ASSAY OF PREALBUMIN: CPT | Performed by: INTERNAL MEDICINE

## 2018-03-07 PROCEDURE — G0463 HOSPITAL OUTPT CLINIC VISIT: HCPCS

## 2018-03-07 PROCEDURE — 40000671 ZZH STATISTIC ANESTHESIA CASE

## 2018-03-07 PROCEDURE — 80053 COMPREHEN METABOLIC PANEL: CPT | Performed by: INTERNAL MEDICINE

## 2018-03-07 PROCEDURE — 31500 INSERT EMERGENCY AIRWAY: CPT

## 2018-03-07 PROCEDURE — 40000986 XR CHEST PORT 1 VW

## 2018-03-07 RX ORDER — NALOXONE HYDROCHLORIDE 0.4 MG/ML
.1-.4 INJECTION, SOLUTION INTRAMUSCULAR; INTRAVENOUS; SUBCUTANEOUS
Status: DISCONTINUED | OUTPATIENT
Start: 2018-03-07 | End: 2018-03-27 | Stop reason: HOSPADM

## 2018-03-07 RX ORDER — PROPOFOL 10 MG/ML
2.5-75 INJECTION, EMULSION INTRAVENOUS CONTINUOUS
Status: DISCONTINUED | OUTPATIENT
Start: 2018-03-07 | End: 2018-03-11

## 2018-03-07 RX ORDER — NICOTINE POLACRILEX 4 MG
15-30 LOZENGE BUCCAL
Status: DISCONTINUED | OUTPATIENT
Start: 2018-03-07 | End: 2018-03-21

## 2018-03-07 RX ORDER — DEXTROSE MONOHYDRATE 25 G/50ML
25-50 INJECTION, SOLUTION INTRAVENOUS
Status: DISCONTINUED | OUTPATIENT
Start: 2018-03-07 | End: 2018-03-21

## 2018-03-07 RX ORDER — PROPOFOL 10 MG/ML
INJECTION, EMULSION INTRAVENOUS PRN
Status: DISCONTINUED | OUTPATIENT
Start: 2018-03-07 | End: 2018-03-07

## 2018-03-07 RX ORDER — PROPOFOL 10 MG/ML
INJECTION, EMULSION INTRAVENOUS
Status: COMPLETED
Start: 2018-03-07 | End: 2018-03-07

## 2018-03-07 RX ORDER — CHLORHEXIDINE GLUCONATE ORAL RINSE 1.2 MG/ML
15 SOLUTION DENTAL EVERY 12 HOURS
Status: DISCONTINUED | OUTPATIENT
Start: 2018-03-07 | End: 2018-03-20

## 2018-03-07 RX ADMIN — SODIUM CHLORIDE 1 UNITS/HR: 9 INJECTION, SOLUTION INTRAVENOUS at 11:56

## 2018-03-07 RX ADMIN — HEPARIN SODIUM 1300 UNITS/HR: 10000 INJECTION, SOLUTION INTRAVENOUS at 11:50

## 2018-03-07 RX ADMIN — PIPERACILLIN SODIUM,TAZOBACTAM SODIUM 3.38 G: 3; .375 INJECTION, POWDER, FOR SOLUTION INTRAVENOUS at 21:16

## 2018-03-07 RX ADMIN — PIPERACILLIN SODIUM,TAZOBACTAM SODIUM 3.38 G: 3; .375 INJECTION, POWDER, FOR SOLUTION INTRAVENOUS at 15:11

## 2018-03-07 RX ADMIN — HYDROMORPHONE HYDROCHLORIDE 0.2 MG: 1 INJECTION, SOLUTION INTRAMUSCULAR; INTRAVENOUS; SUBCUTANEOUS at 06:13

## 2018-03-07 RX ADMIN — PIPERACILLIN SODIUM,TAZOBACTAM SODIUM 3.38 G: 3; .375 INJECTION, POWDER, FOR SOLUTION INTRAVENOUS at 09:16

## 2018-03-07 RX ADMIN — PROPOFOL 20 MCG/KG/MIN: 10 INJECTION, EMULSION INTRAVENOUS at 15:52

## 2018-03-07 RX ADMIN — FAMOTIDINE 20 MG: 10 INJECTION, SOLUTION INTRAVENOUS at 06:39

## 2018-03-07 RX ADMIN — CHLORHEXIDINE GLUCONATE 15 ML: 1.2 RINSE ORAL at 20:26

## 2018-03-07 RX ADMIN — Medication 0.5 MG/MIN: at 17:11

## 2018-03-07 RX ADMIN — DEXTROSE MONOHYDRATE: 50 INJECTION, SOLUTION INTRAVENOUS at 15:56

## 2018-03-07 RX ADMIN — PROPOFOL 20 MCG/KG/MIN: 10 INJECTION, EMULSION INTRAVENOUS at 05:55

## 2018-03-07 RX ADMIN — Medication 0.5 MG/MIN: at 08:26

## 2018-03-07 RX ADMIN — CHLORHEXIDINE GLUCONATE 15 ML: 1.2 RINSE ORAL at 11:06

## 2018-03-07 RX ADMIN — PIPERACILLIN SODIUM,TAZOBACTAM SODIUM 3.38 G: 3; .375 INJECTION, POWDER, FOR SOLUTION INTRAVENOUS at 04:14

## 2018-03-07 RX ADMIN — PROPOFOL 60 MG: 10 INJECTION, EMULSION INTRAVENOUS at 05:14

## 2018-03-07 NOTE — CODE/RAPID RESPONSE
0453 - Wed 7 Mar 2018   306     # resp distress - impending resp/ventilatory failure -   - upper airway obstruction 2/2 zhang-like picture   - periodic breathing pattern of unclear etiology     - intubate   - icu   - intensivist       KRISTOFER Hopson MD / 25 min   [place] [facility] / [phone] (p)   ________________________________        RN -   - concerned worsening breathing poss airway obstruction 2/2 secretions   - RT NT suctioned with improvement -   - less obstruction - better air movement     -initially, on arrival, resp distress corrected by suctioning prior to arrival as above   - <60 sec after arrival upper airway again obstructing against good inspiratory effort   - not 2/2 secretions or foreign matter but rather airway collapse as in zhang       - looks concerning - severe resp distress - (HOB 20 )    - eyes closed - not respond loud voice     - gasping resp effort varying from weak to strong     - air entry varying -   - slight entry with strong effort with snoring stridorous sounds of obstructive apnea    - no entry with less than maximal effort     - gentle jaw thrust relieved upper airway obstruction allowed good air entry without pt arousing - cont somnolent     - with jaw thrust airway/breathing clear - no audible rhonchi of upper airway secretions     - periodic resp pattern - cont gasping quality to effort -     - apneic 10 sec then gradually increasing deeper til maximal tidal vol     - then gradually shallower til apneic period again     - gentle sternal rub aroused to keating off     - good air entry when airway jaw thrust opened and good effort -   - no audible upper airway sounds - no rhonchi (no evidence upper airway obstruction 2/2 secretions) -   - clear ausc bilat equal ant apex   - 98% - 10 L oxymask maintaining with jaw thrust => vbg on this   - vbg drawn prior to intubation along with am labs     - appears fatiguing   - concerned near/impending ventilatory failure/arrest   - no narcs benzos - no  reversible causes apparent   - needs intubation for airway protection     - airway maintained open by jaw thrust until CRNA present   - on CRNA arrival pt bagged then intubated   - to icu ~ 0530   - d/w and defer further care to Intensivist       -in icu -   cxr - unchanged    - L cpa  - r hemidiaphragm up sl  - bihilar prominence vasc markings    - ett good position above luly few cm      Vbg 7.30 / 67 / - / 33  -  10L oxymask   (0515)                7    0515     Na 149   k 4.1   tCO2 32   bun 27   creat 1.25   gfr 55   Ca  7.5      wbc 7.7   n 76   ly 11  m 6.3      rbc 3.86   hgb 12.4  hct 40.6   mcv  105   rdw 12.9    => macrocytic chronic ?folate B12 no anemia      plt 131  Sl \^         - full code 85yM -   - SBO - pod3-lap TISHA/ SB resection - tpn ivf   - narrow complex tachycardia / mild hypotension - afib/flutter - vol - BB(metop) - dilt drip -   - bibasilar opacities - ?pneumonia   - interstitial opacities + honeycombing periphery bilat - ?fibrosis ?usual interstitial pneumonia - Zosyn  - procal   - hyperNatremia 2/2 hypertonic ivf + lo po   - aki1 - prerenal - ivf bmp   - lo plts - ?etiology - ?med  ?chronic other etiol - f/u plt - transfuse < 50k procedure, < 10k   - acute delirium / memory problems pta x few mo - quetiapine + prn olanzapine haloperidol   - psoriasis - topical   - proph - pcd ambulate iv-ranitidine

## 2018-03-07 NOTE — PROGRESS NOTES
Intensivist update:  Addendum to Dr. Parr's overnight transfer note.    No acute events today.  Remains hemodynamically stable.  Rate controlled on amiodarone.  Oxygenating well on ventilator.  No changes on exam from Dr. Tavares assessment.  Remains sedated, synchronous with ventilator.    Labs (personally reviewed/interpreted):  Mild hyperNa to 149, creat stable 1.25, lactate resolved to 1.0, abg now with mild mixed alkalosis:  7.50/37/172/29, sugar high at 176, trop negative.    CXR (personally reviewed/interpreted):  B/l perihilar fullness, eTT in acceptable position, unable to track G tube on this film.    EKG (personally reviewed/interpeted):  Sinus 75, RBBB, no acute ischemic changes    A/P:  Neurology/Psychiatry:   1. Encephalopathy/agitation - related to hypoxemia -? Evolving sepsis +/- delirium   2. Analgesia/Anxiety  Plan  - propofol gtt , goal RASS  0   - prn versed   - prn dilaudid    - continue olanzapine     Cardiovascular:   1.Hemodynamics -currently appropriate   2.Rhythm - atrial flutter with RVR on anticoagulation  Plan  - follow hemodynamics, goal MAP >65, no pressors yet  - lactate resolved  - continue amiodarone gtt--RVR controlled  - continue digoxin/metoprolol - though defer to EP   - trop ok  - continue heparin gtt for afib     Pulmonary/Ventilator Management:   1. Acute hypercarbic respiratory failure - in setting of recent surgery, likely aspiration with evidence of REJI physiology on initial eval - oxygenation/mechanics satisfactory on vent,   2. Aspiration pneumonia  3. Mild emphysema/fibrosis - ? Age related   4.  Respiratory alkalosis  Plan  - continue AC  (7ml/kg) , wean FIO2 goal sats >90% on lowest FIO, decreasing set rate  - serial CXR ABG as indicated  - pulmonary toilet      GI and Nutrition :   1. Small bowel obstruction s/p ex lap 3/4 - with subsequent imaging suggestive of continued obstruction  2. Severe malnutrition  3. GERD/Carlson's   Plan  - NG tube suction  -  H2 blocker   - tpn   - surgery following      Renal/Fluids/Electrolytes:   1. ?Mild BJ, slight elevation in creatinine  2. Electrolyte abnormality - hypernatremia  3. Acid/base status - resp alkalosis  4. Volume status  Plan  - maintain hemodynamics   - monitor function and electrolytes as needed with replacement per ICU protocols.  - generally avoid nephrotoxic agents such as NSAID, IV contrast unless specifically required  - adjust medications as needed for renal clearance  - follow I/O's as appropriate.  - d5 drip for hyperNa     Infectious Disease:   1. Aspiration pneumonia/sepsis   Plan  - check sputum, procalcitonin  - continue zosyn      Endocrine:   1. Stress induced hyperglycemia  Plan  - ICU insulin protocol, goal sugar <180  -Added insulin drip     Hematology/Oncology:   1. No active issue, empirically anticoagulated   Plan  - serial cbc, coags as indicated       IV/Access:   1. Venous access - PICC   2. Arterial access - none  Plan  - central access required and necessary     ICU Prophylaxis:   1. DVT: Hep gtt   2. VAP: HOB 30 degrees, chlorhexidine rinse  3. Stress Ulcer: H2 blocker  4. Restraints: Nonviolent soft two point restraints required and necessary for patient safety and continued cares and good effect as patient continues to pull at necessary lines, tubes despite education and distraction. Will readdress daily.   5. Wound care - per unit routine   6. Feeding - TPN   7. Family Update: pending   8. Disposition - ICU    Additional critical care time 20 minutes (in addition to 60 min by Dr. Parr earlier in the day; total time now 80 min).

## 2018-03-07 NOTE — PLAN OF CARE
Problem: Patient Care Overview  Goal: Plan of Care/Patient Progress Review  Outcome: No Change  Confused. Combative, given haldol x1, and mits placed on pt's hands. Tele NSR, amio at 0.5. O2 weaned from 8L to 4L oxymask. NG with brown/bile output. Heparin at 850. Fragile skin with multiple skin tears, exacerbated by pt being combative, WOC consult placed . Repo q2, pt doesn't tolerate well but trying to punch and kick. Segal. TPN to be started tonight.

## 2018-03-07 NOTE — PROGRESS NOTES
Surgery    Overnight events reviewed. Patient now intubated and sedated. Has not required any vasopressors. NG output slowly decreasing. No fevers overnight.    Abdomen-soft with appropriate tenderness.    A/P  Appreciate ICU care. Saturating well compared to yesterday. Abdomen remains soft without significant tenderness or distention. Continue current care.    Robel Gomez M.D.  [facility]  [phone]

## 2018-03-07 NOTE — PLAN OF CARE
Problem: Patient Care Overview  Goal: Plan of Care/Patient Progress Review  Outcome: Declining  Alert at times but mostly somnolent. Disorientated to place, time and situation. VSS except for elevated BP's on 2 LPM oxymask. Tele SR. Hep gtt @ 1300. Amio gtt @ 0.5. TPN @ 50 and lipids @ 20.8. LS diminished with some coarse crackles. Suction done through nasal trumpet and orally with [place]. NG @ 59 cm with dark brown drainage. BS hypoactive. Abdominal incision with steri-strips.CDI. Skin tears on B arm with foam. Repo every 2 hours. Segal with borderline UOP. RRT called @ 0455 for changes in breathing, decrease O2 sats, and increased oxygen needs. Patient was intubated and and transferred to ICU.

## 2018-03-07 NOTE — ANESTHESIA CARE TRANSFER NOTE
Patient: Joseluis Falcon    * No procedures listed *    Diagnosis: * No pre-op diagnosis entered *  Diagnosis Additional Information: No value filed.    Anesthesia Type:   No value filed.     Note:  Airway :ETT    Comments: Pt Intubated, +BBS, + ETCO2. RT and MD and bedside. RT assumed responsibility. VSS      Vitals: (Last set prior to Anesthesia Care Transfer)              Electronically Signed By: JASMINA Guerra CRNA  March 7, 2018  6:11 AM

## 2018-03-07 NOTE — PROGRESS NOTES
Critical Care Progress Note      03/07/2018    Name: Joseluis Falcon MRN#: [id number]   Age: 85 year old YOB: 1932     Hsptl Day# 8  ICU DAY #    MV DAY #             Problem List:   Active Problems:    Small bowel obstruction           Summary/Hospital Course:   86 yo male hx of GERD, barretts esophagus and skin cancer admitted 2/27 with small bowel obstruction. Initially managed conservatively however ultimately required operative investigation 3/4 where he underwent lysis of adhesions and partial small bowel resection.  His post op course was complicated by respiratory distress, agitation and SVT, atrial flutter for which he started on amiodarone via EP.  Additional imaging post op suggested continued bowel obstruction. RRT called yesterday for respiratory distress with CT Chest obtained 3/6 for respiratory distress negative for PE but mild emphysema , basilar fibrosis and possible pneumonia for he was started on zosyn.  TPN started 3/6     Earlier the evening of 3/6 nursing noted patient increased coarse breath sounds and desat, nasal trumpet placed for NT suctioned with transient improvement.  This AM was minimally arousable with desaturation not responsive to suctioning.  RRT called with patient found to be obstructing airway thus anesthesia called and patient intubated and transferred to the ICU.     On arrival to ICU patient sedate, hemodynamically appropriate, though not following commands.  VBG pre intubation demonstrated respiratory acidosis. NG output dark bilious - unchanged from prior per floor nurse.          Assessment and plan :     Joseluis Falcon IS a 85 year old male admitted on 2/27/2018 for acute hypoxemic respiratory failure with likely aspiration in setting of SBO and recent surgery   I have personally reviewed the daily labs, imaging studies, cultures and discussed the case with referring physician and consulting physicians.     My assessment and plan by system for this patient is as  follows:    Neurology/Psychiatry:   1. Encephalopathy/agitation - related to hypoxemia -? Evolving sepsis +/- delirium   2. Analgesia/Anxiety  Plan  - propofol gtt , goal RASS  0   - prn versed   - prn dilaudid    - continue olanzapine    Cardiovascular:   1.Hemodynamics -currently appropriate   2.Rhythm - atrial flutter with R on anticoagulation  3. Ischemia - ? contributing   Plan  - follow hemodynamics, goal MAP >65  - check lactic acid   - continue amiodarone gtt  - continue digoxin/metoprolol - though defer to EP   - check troponin  - continue heparin gtt     Pulmonary/Ventilator Management:   1. Acute hypercarbic respiratory failure - in setting of recent surgery, likely aspiration with evidence of REJI physiology on initial eval - oxygenation/mechanics satisfactory, CXR ett in good position, Right sided infiltrate  2. Aspiration pneumonia  3. Mild emphysema/fibrosis - ? Age related   Plan  - continue AC  (7ml/kg) , wean FIO2 goal sats >90% on lowest FIO,   - serial CXR ABG as indicated  - pulmonary toilet     GI and Nutrition :   1. Small bowel obstruction s/p ex lap 3/4 - with subsequent imaging suggestive of continued obstruction  2. Severe malnutrition  3. GERD/Carlson's   Plan  - NG tube suction  - H2 blocker   - tpn   - surgery following     Renal/Fluids/Electrolytes:   1. ?Mild BJ, slight elevation in creatinine  2. Electrolyte abnormality - hypernatremia  3. Acid/base status - resp acidosis,   4. Volume status  Plan  - maintain hemodynamics   - monitor function and electrolytes as needed with replacement per ICU protocols.  - generally avoid nephrotoxic agents such as NSAID, IV contrast unless specifically required  - adjust medications as needed for renal clearance  - follow I/O's as appropriate.    Infectious Disease:   1. Aspiration pneumonia/sepsis   Plan  - check sputum, procalcitonin  - continue zosyn     Endocrine:   1. Stress induced hyperglycemia  Plan  - ICU insulin protocol, goal sugar  <180    Hematology/Oncology:   1. No active issue, empirically anticoagulated   Plan  - serial cbc, coags as indicated     IV/Access:   1. Venous access - PICC   2. Arterial access - none  Plan  - central access required and necessary      ICU Prophylaxis:   1. DVT: Hep gtt   2. VAP: HOB 30 degrees, chlorhexidine rinse  3. Stress Ulcer: H2 blocker  4. Restraints: Nonviolent soft two point restraints required and necessary for patient safety and continued cares and good effect as patient continues to pull at necessary lines, tubes despite education and distraction. Will readdress daily.   5. Wound care - per unit routine   6. Feeding - TPN   7. Family Update: pending   8. Disposition - ICU        Key goals for next 24 hours:   1. Stabilize vent settings    2. Check labs (procal, LA)  3. Continue abx.                Interim History:   - see above            Key Medications:       chlorhexidine  15 mL Mouth/Throat Q12H     famotidine  20 mg Intravenous Q12H     propofol         sodium chloride 0.9%  500 mL Intravenous Once     metoprolol  5 mg Intravenous Once     sodium chloride (PF)  3 mL Intracatheter Q8H     piperacillin-tazobactam  3.375 g Intravenous Q6H     sodium chloride (PF)  10 mL Intracatheter Q8H     amiodarone  150 mg Intravenous Once     famotidine  20 mg Intravenous Q24H     lipids  250 mL Intravenous Every Other Day     insulin aspart  1-12 Units Subcutaneous Q4H     QUEtiapine  12.5 mg Oral At Bedtime     OLANZapine  5 mg Intramuscular Once       amiodarone 0.5 mg/min (03/06/18 2210)     D5W 50 mL/hr at 03/06/18 1355     HEParin 1,300 Units/hr (03/07/18 0331)     IV fluid REPLACEMENT ONLY       parenteral nutrition - Clinimix E 50 mL/hr at 03/06/18 2005               Physical Examination:   Temp:  [98.1  F (36.7  C)-98.8  F (37.1  C)] 98.3  F (36.8  C)  Pulse:  [145] 145  Heart Rate:  [] 68  Resp:  [14-42] 28  BP: ()/(36-84) 167/80  SpO2:  [89 %-100 %] 93 %    Intake/Output Summary (Last  24 hours) at 03/07/18 0548  Last data filed at 03/06/18 2200   Gross per 24 hour   Intake          1259.37 ml   Output             3275 ml   Net         -2015.63 ml     Wt Readings from Last 4 Encounters:   02/28/18 74.8 kg (164 lb 14.5 oz)   06/09/17 62.8 kg (138 lb 8 oz)   05/25/17 65.8 kg (145 lb)   12/01/15 75.3 kg (166 lb)        Ventilation Mode: CMV/AC  (Continuous Mandatory Ventilation/ Assist Control)  Rate Set (breaths/minute): 14 breaths/min  Tidal Volume Set (mL): 500 mL  PEEP (cm H2O): 5 cmH2O  Oxygen Concentration (%): 70 %  Resp: 28    Recent Labs  Lab 03/06/18  1006 03/06/18  0854   PH 7.46* Canceled, Test credited   PCO2 44 Canceled, Test credited   PO2 83 Canceled, Test credited   HCO3 32* Canceled, Test credited       GEN: intubated mild  distress cachectic  HEENT: head ncat, sclera anicteric, OP patent, trachea midline   PULM: unlabored synchronous with vent, coarse anteriorly    CV/COR: irregular rhythm S1S2 no gallop,  No rub, no murmur  ABD: soft nontender, hypoactive bowel sounds, no mass trocar sites c/d/i   EXT: no edema,  warm  NEURO: moving all 4 extremities, limited by sedation  SKIN: scattered ecchymoses over extremities , blanchable stage 1 coccyx,   LINES: clean, dry intact         Data:   All data and imaging reviewed     ROUTINE ICU LABS (Last four results)  CMP  Recent Labs  Lab 03/07/18  0515 03/06/18  0857 03/06/18  0335 03/05/18  0813 03/02/18  0810   *  --  153* 152* 143   POTASSIUM 4.1  --  4.1 4.3 4.6   CHLORIDE 115*  --  116* 119* 111*   CO2 32  --  34* 30 26   ANIONGAP 2*  --  3 3 6   *  --  140* 114* 166*   BUN 27  --  31* 32* 36*   CR 1.25  --  1.37* 1.15 1.25   GFRESTIMATED 55*  --  49* 60* 55*   GFRESTBLACK 66  --  60* 73 66   TOBI 7.5*  --  8.2* 8.0* 8.4*   MAG  --  2.6*  --  2.3  --    PHOS  --  3.0  --   --   --      CBC  Recent Labs  Lab 03/07/18  0515 03/06/18  0335 03/05/18  0813 03/04/18  0800 02/28/18  0743   WBC 7.7 7.1 5.1  --  5.0   RBC 3.86*  4.17* 4.12*  --  4.34*   HGB 12.4* 13.5 13.5 14.1 14.1   HCT 40.6 43.1 42.0  --  42.8   * 103* 102*  --  99   MCH 32.1 32.4 32.8  --  32.5   MCHC 30.5* 31.3* 32.1  --  32.9   RDW 12.9 12.7 12.8  --  12.8   * 147* 144*  --  133*     INRNo lab results found in last 7 days.  Arterial Blood Gas  Recent Labs  Lab 03/06/18  1006 03/06/18  0854   PH 7.46* Canceled, Test credited   PCO2 44 Canceled, Test credited   PO2 83 Canceled, Test credited   HCO3 32* Canceled, Test credited       All cultures:  No results for input(s): CULT in the last 168 hours.  Recent Results (from the past 24 hour(s))   XR Chest Port 1 View    Narrative    XR CHEST PORT 1 VW 3/6/2018 1:10 PM    COMPARISON: CT earlier on the same day.    HISTORY: Verify PICC tip placement.      Impression    IMPRESSION: Right upper extremity PICC tip in the low SVC. Enteric  tube tip and sidehole project over the stomach. Peripherally  predominant interstitial opacities again seen in both lungs, likely  representing fibrotic change. No pneumothorax seen on either side.    ASIF KINNEY MD         Billing: This patient is critically ill: Yes. Total critical care time today 60 min.

## 2018-03-07 NOTE — PROGRESS NOTES
Went to evaluate the patient and he is currently intubated from events over night.  Will sign off at this time.  Please contact the hospitalist service once you would like us to resume care.     Kash Omer DO   Hospitalist

## 2018-03-07 NOTE — PROGRESS NOTES
Right wrist and Left wrist  restraints initiated on patient on 3/7/2018 at 6:00 AM    Clinical Justification: Pulling lines, pulling tubes, and pulling equipment  Less Restrictive Alternative: Repositioning, Disguise equipment, Reorientation  Attending Physician Notified: Yes,     Order received: Yes     Family Notification: Other   Criteria explained to Patient  Patient's Response: No evidence of learning  Restraint care Plan initiated: Yes    Wojciech Cortez

## 2018-03-07 NOTE — PLAN OF CARE
Problem: Patient Care Overview  Goal: Plan of Care/Patient Progress Review  OT and PT: Patient transferred to ICU, is intubated and sedated. Not appropriate for therapy today.

## 2018-03-07 NOTE — PROGRESS NOTES
Pt transfer from floor to ICU post rapid response. Pt is intubated and propofol is being started. Breath sounds- bilateral rhonchi that improves with suctioning. VS stable at this time.

## 2018-03-07 NOTE — PROGRESS NOTES
"EP- Cardiology Progress Note           Assessment and Plan:     85-yo M without significant PMHx who was admitted on 02/27/2018 with small-bowel obstruction (s/p partial small bowel resection on 03/04/2018) and hospital stay was complicated by Afib with RVR.     This AM, patient experienced respiratory distress/ failure.  He is currently intubated in the ICU.    Echo: EF of 65-70%; mild mitral valve prolapse and mild- mod MR.    PLAN    1. Cardiovascular. Afib.  HR is well controlled on Amio. Plan: Continue amio drip at current dose and heparin drip (CHADS_Vasc of 2).    2. Respiratory.  Resp failure/ PNA.   -Saturating well on CMV ( Fio2 of 50%; PEEP of 5). Continue current therapy.     3. Neuro. Sedated and intubated.   4. Renal. Normal renal Function.  5. GI. Small bowel obstruction. S/p partial small bowel resection on 03/04/2018.  Continue TPN and PPI for prophylaxis.  6. Hematology.  On heparin drip.    I spent 35 minutes in the case, and over 50% of the time was spent reviewing chart, counseling and coordinating critical care as described above.      Physical Exam:  Vitals: /68  Pulse 145  Temp 97.3  F (36.3  C) (Axillary)  Resp 16  Ht 1.753 m (5' 9\")  Wt 74.8 kg (164 lb 14.5 oz)  SpO2 100%  BMI 24.35 kg/m2      Intake/Output Summary (Last 24 hours) at 03/07/18 1123  Last data filed at 03/07/18 1000   Gross per 24 hour   Intake          2887.98 ml   Output             2125 ml   Net           762.98 ml     Vitals:    02/27/18 1212 02/28/18 0700   Weight: 74.8 kg (165 lb) 74.8 kg (164 lb 14.5 oz)       Constitutional:  AAO x3.  Pt is in NAD.  HEAD: Normocephalic.  SKIN: Skin normal color, texture and turgor with no lesions or eruptions.  Eyes: PERRL, EOMI.  ENT:  Supple, normal JVP. No lymphadenopathy or thyroid enlargement.  Chest:  Decrease breath sound in base B/L.  Cardiac:  IIR, variable sound of S1 and Normal S2.   No murmurs rubs or gallop.    Abdomen:  Normal BS.  Soft, non-tender and " non-distended.  No rebound or guarding.    Extremities:  Pedious pulses palpable B/L.  No LE edema noticed.   Neurological: Strength and sensation grossly symmetric and intact throughout.                            Review of Systems:   As per subjective, otherwise 5 systems reviewed and negative.         Medications:          chlorhexidine  15 mL Mouth/Throat Q12H     [START ON 3/8/2018] famotidine  20 mg Intravenous Q24H     sodium chloride 0.9%  500 mL Intravenous Once     metoprolol  5 mg Intravenous Once     sodium chloride (PF)  3 mL Intracatheter Q8H     piperacillin-tazobactam  3.375 g Intravenous Q6H     sodium chloride (PF)  10 mL Intracatheter Q8H     amiodarone  150 mg Intravenous Once     lipids  250 mL Intravenous Every Other Day     insulin aspart  1-12 Units Subcutaneous Q4H     QUEtiapine  12.5 mg Oral At Bedtime     PRN Meds: naloxone, midazolam, IV fluid REPLACEMENT ONLY, glucose **OR** dextrose **OR** glucagon, metoprolol, lidocaine (buffered or not buffered), lidocaine 4%, sodium chloride (PF), nitroGLYcerin, sodium chloride (PF), IV fluid REPLACEMENT ONLY, glucose **OR** dextrose **OR** glucagon, artificial saliva, magnesium sulfate, HYDROmorphone, oxyCODONE IR, acetaminophen, melatonin, acetaminophen, HYDROmorphone, ondansetron **OR** ondansetron, prochlorperazine **OR** prochlorperazine **OR** prochlorperazine, haloperidol lactate             Data:       Recent Labs  Lab 03/07/18  0515 03/06/18  0857 03/06/18  0335 03/05/18  0813   WBC 7.7  --  7.1 5.1   HGB 12.4*  --  13.5 13.5   *  --  103* 102*   *  --  147* 144*   INR 1.23*  --   --   --    *  --  153* 152*   POTASSIUM 4.1  --  4.1 4.3   CHLORIDE 115*  --  116* 119*   CO2 32  --  34* 30   BUN 27  --  31* 32*   CR 1.25  --  1.37* 1.15   ANIONGAP 2*  --  3 3   TOBI 7.5*  --  8.2* 8.0*   *  --  140* 114*   ALBUMIN 2.1*  --   --   --    PROTTOTAL 5.3*  --   --   --    BILITOTAL 1.0  --   --   --    ALKPHOS 51  --   --    --    ALT 12  --   --   --    AST 18  --   --   --    TROPI 0.024 <0.015  --   --

## 2018-03-07 NOTE — PROGRESS NOTES
Patient was called on RRT. Doctor determined that patient is unable to protect his airway, and called for intubation. Patient was intubated at 0515 with size 8.0 ET-tube secured to 23 cm @ lip. Et-tube placement was conformed by equal bilateral breath sounds and Etco2 detection device.patient was transferred to ICU and placed on a  vent.  Ventilation Mode: CMV/AC  (Continuous Mandatory Ventilation/ Assist Control)  Rate Set (breaths/minute): 14 breaths/min  Tidal Volume Set (mL): 500 mL  PEEP (cm H2O): 5 cmH2O  Oxygen Concentration (%): 70 %  Resp: 28    3/7/2018  Arnie Dow

## 2018-03-08 LAB
ANION GAP SERPL CALCULATED.3IONS-SCNC: 5 MMOL/L (ref 3–14)
BUN SERPL-MCNC: 28 MG/DL (ref 7–30)
CALCIUM SERPL-MCNC: 7.3 MG/DL (ref 8.5–10.1)
CHLORIDE SERPL-SCNC: 109 MMOL/L (ref 94–109)
CO2 SERPL-SCNC: 28 MMOL/L (ref 20–32)
CREAT SERPL-MCNC: 1.18 MG/DL (ref 0.66–1.25)
ERYTHROCYTE [DISTWIDTH] IN BLOOD BY AUTOMATED COUNT: 12.7 % (ref 10–15)
GFR SERPL CREATININE-BSD FRML MDRD: 59 ML/MIN/1.7M2
GLUCOSE BLDC GLUCOMTR-MCNC: 104 MG/DL (ref 70–99)
GLUCOSE BLDC GLUCOMTR-MCNC: 120 MG/DL (ref 70–99)
GLUCOSE BLDC GLUCOMTR-MCNC: 120 MG/DL (ref 70–99)
GLUCOSE BLDC GLUCOMTR-MCNC: 124 MG/DL (ref 70–99)
GLUCOSE BLDC GLUCOMTR-MCNC: 126 MG/DL (ref 70–99)
GLUCOSE BLDC GLUCOMTR-MCNC: 127 MG/DL (ref 70–99)
GLUCOSE BLDC GLUCOMTR-MCNC: 140 MG/DL (ref 70–99)
GLUCOSE BLDC GLUCOMTR-MCNC: 141 MG/DL (ref 70–99)
GLUCOSE BLDC GLUCOMTR-MCNC: 142 MG/DL (ref 70–99)
GLUCOSE BLDC GLUCOMTR-MCNC: 142 MG/DL (ref 70–99)
GLUCOSE BLDC GLUCOMTR-MCNC: 145 MG/DL (ref 70–99)
GLUCOSE BLDC GLUCOMTR-MCNC: 148 MG/DL (ref 70–99)
GLUCOSE SERPL-MCNC: 131 MG/DL (ref 70–99)
HCT VFR BLD AUTO: 34.1 % (ref 40–53)
HGB BLD-MCNC: 10.9 G/DL (ref 13.3–17.7)
INTERPRETATION ECG - MUSE: NORMAL
LMWH PPP CHRO-ACNC: 0.24 IU/ML
MAGNESIUM SERPL-MCNC: 2.3 MG/DL (ref 1.6–2.3)
MCH RBC QN AUTO: 32.2 PG (ref 26.5–33)
MCHC RBC AUTO-ENTMCNC: 32 G/DL (ref 31.5–36.5)
MCV RBC AUTO: 101 FL (ref 78–100)
PHOSPHATE SERPL-MCNC: 2.9 MG/DL (ref 2.5–4.5)
PLATELET # BLD AUTO: 126 10E9/L (ref 150–450)
POTASSIUM SERPL-SCNC: 3.8 MMOL/L (ref 3.4–5.3)
RBC # BLD AUTO: 3.38 10E12/L (ref 4.4–5.9)
SODIUM SERPL-SCNC: 142 MMOL/L (ref 133–144)
WBC # BLD AUTO: 7.7 10E9/L (ref 4–11)

## 2018-03-08 PROCEDURE — 40000275 ZZH STATISTIC RCP TIME EA 10 MIN

## 2018-03-08 PROCEDURE — 25000125 ZZHC RX 250: Performed by: HOSPITALIST

## 2018-03-08 PROCEDURE — 85520 HEPARIN ASSAY: CPT | Performed by: INTERNAL MEDICINE

## 2018-03-08 PROCEDURE — 20000003 ZZH R&B ICU

## 2018-03-08 PROCEDURE — 80048 BASIC METABOLIC PNL TOTAL CA: CPT | Performed by: INTERNAL MEDICINE

## 2018-03-08 PROCEDURE — 99291 CRITICAL CARE FIRST HOUR: CPT | Performed by: INTERNAL MEDICINE

## 2018-03-08 PROCEDURE — 25000128 H RX IP 250 OP 636: Performed by: PHYSICIAN ASSISTANT

## 2018-03-08 PROCEDURE — 94003 VENT MGMT INPAT SUBQ DAY: CPT

## 2018-03-08 PROCEDURE — 83735 ASSAY OF MAGNESIUM: CPT | Performed by: INTERNAL MEDICINE

## 2018-03-08 PROCEDURE — 25000128 H RX IP 250 OP 636: Performed by: HOSPITALIST

## 2018-03-08 PROCEDURE — 99232 SBSQ HOSP IP/OBS MODERATE 35: CPT | Performed by: INTERNAL MEDICINE

## 2018-03-08 PROCEDURE — 25000128 H RX IP 250 OP 636: Performed by: INTERNAL MEDICINE

## 2018-03-08 PROCEDURE — 40000239 ZZH STATISTIC VAT ROUNDS

## 2018-03-08 PROCEDURE — 00000146 ZZHCL STATISTIC GLUCOSE BY METER IP

## 2018-03-08 PROCEDURE — 40000008 ZZH STATISTIC AIRWAY CARE

## 2018-03-08 PROCEDURE — 25000125 ZZHC RX 250

## 2018-03-08 PROCEDURE — 85027 COMPLETE CBC AUTOMATED: CPT | Performed by: INTERNAL MEDICINE

## 2018-03-08 PROCEDURE — 84100 ASSAY OF PHOSPHORUS: CPT | Performed by: INTERNAL MEDICINE

## 2018-03-08 RX ORDER — ACETAMINOPHEN 160 MG
TABLET,DISINTEGRATING ORAL EVERY 8 HOURS PRN
Status: DISCONTINUED | OUTPATIENT
Start: 2018-03-08 | End: 2018-03-08 | Stop reason: CLARIF

## 2018-03-08 RX ADMIN — Medication 0.5 MG/MIN: at 00:52

## 2018-03-08 RX ADMIN — Medication 0.25 MG/MIN: at 22:32

## 2018-03-08 RX ADMIN — HEPARIN SODIUM 1850 UNITS/HR: 10000 INJECTION, SOLUTION INTRAVENOUS at 01:07

## 2018-03-08 RX ADMIN — I.V. FAT EMULSION 250 ML: 20 EMULSION INTRAVENOUS at 20:16

## 2018-03-08 RX ADMIN — PROPOFOL 20 MCG/KG/MIN: 10 INJECTION, EMULSION INTRAVENOUS at 01:13

## 2018-03-08 RX ADMIN — PIPERACILLIN SODIUM,TAZOBACTAM SODIUM 3.38 G: 3; .375 INJECTION, POWDER, FOR SOLUTION INTRAVENOUS at 15:32

## 2018-03-08 RX ADMIN — DEXTROSE MONOHYDRATE: 50 INJECTION, SOLUTION INTRAVENOUS at 20:34

## 2018-03-08 RX ADMIN — CHLORHEXIDINE GLUCONATE 15 ML: 1.2 RINSE ORAL at 08:12

## 2018-03-08 RX ADMIN — PROPOFOL 10 MCG/KG/MIN: 10 INJECTION, EMULSION INTRAVENOUS at 12:40

## 2018-03-08 RX ADMIN — PIPERACILLIN SODIUM,TAZOBACTAM SODIUM 3.38 G: 3; .375 INJECTION, POWDER, FOR SOLUTION INTRAVENOUS at 10:11

## 2018-03-08 RX ADMIN — FAMOTIDINE 20 MG: 10 INJECTION, SOLUTION INTRAVENOUS at 08:08

## 2018-03-08 RX ADMIN — Medication 0.5 MG/MIN: at 09:09

## 2018-03-08 RX ADMIN — HYDROMORPHONE HYDROCHLORIDE 0.2 MG: 1 INJECTION, SOLUTION INTRAMUSCULAR; INTRAVENOUS; SUBCUTANEOUS at 16:22

## 2018-03-08 RX ADMIN — HEPARIN SODIUM 1850 UNITS/HR: 10000 INJECTION, SOLUTION INTRAVENOUS at 15:23

## 2018-03-08 RX ADMIN — PIPERACILLIN SODIUM,TAZOBACTAM SODIUM 3.38 G: 3; .375 INJECTION, POWDER, FOR SOLUTION INTRAVENOUS at 20:47

## 2018-03-08 RX ADMIN — PIPERACILLIN SODIUM,TAZOBACTAM SODIUM 3.38 G: 3; .375 INJECTION, POWDER, FOR SOLUTION INTRAVENOUS at 04:22

## 2018-03-08 RX ADMIN — PROPOFOL 20 MCG/KG/MIN: 10 INJECTION, EMULSION INTRAVENOUS at 22:31

## 2018-03-08 RX ADMIN — CHLORHEXIDINE GLUCONATE 15 ML: 1.2 RINSE ORAL at 20:36

## 2018-03-08 RX ADMIN — ASCORBIC ACID, VITAMIN A PALMITATE, CHOLECALCIFEROL, THIAMINE HYDROCHLORIDE, RIBOFLAVIN-5 PHOSPHATE SODIUM, PYRIDOXINE HYDROCHLORIDE, NIACINAMIDE, DEXPANTHENOL, ALPHA-TOCOPHEROL ACETATE, VITAMIN K1, FOLIC ACID, BIOTIN, CYANOCOBALAMIN: 200; 3300; 200; 6; 3.6; 6; 40; 15; 10; 150; 600; 60; 5 INJECTION, SOLUTION INTRAVENOUS at 09:10

## 2018-03-08 NOTE — PROGRESS NOTES
Critical Care Progress Note      03/08/2018    Name: Joseluis Falcon MRN#: [id number]   Age: 85 year old YOB: 1932     Hsptl Day# 9  ICU DAY #    MV DAY #             Problem List:   Active Problems:    Small bowel obstruction    Protein-calorie malnutrition (H)           Summary/Hospital Course:   85M initially admitted for SBO, managed non-operatively at first but then had surgery on 3/4. Decompensated with encephalopathy and respiratory failure night of 3/6, intubated and transferred to ICU.  Since being in the ICU he has remained stable.  We have weaned his ventilator and amiodarone drips.  He continues on heparin and tpn        Assessment and plan :     Joseluis Falcon IS a 85 year old male admitted on 2/27/2018 for respiratory failure.   I have personally reviewed the daily labs, imaging studies, cultures and discussed the case with referring physician and consulting physicians.     My assessment and plan by system for this patient is as follows:    Neurology/Psychiatry:   1. Encephalopathy/agitation - related to hypoxemia -? Evolving sepsis +/- delirium   2. Analgesia/Anxiety  Plan  - propofol gtt , goal RASS  0   - prn versed   - prn dilaudid    - continue olanzapine      Cardiovascular:   1.Hemodynamics -currently appropriate   2.Rhythm - atrial flutter with RVR on anticoagulation  Plan  - follow hemodynamics, goal MAP >65, no pressors yet  - lactate resolved  - now back in sinus rhythm.  Would reduce or dc amio drip, but will defer to EP management.  - continue digoxin/metoprolol - though defer to EP   - trop ok--stop trending  - continue heparin gtt for afib      Pulmonary/Ventilator Management:   1. Acute hypercarbic respiratory failure - in setting of recent surgery, likely aspiration with evidence of REJI physiology on initial eval - oxygenation/mechanics satisfactory on vent,   2. Aspiration pneumonia  3. Mild emphysema/fibrosis - ? Age related   4.  Respiratory alkalosis - improved with vent  changes  Plan  - continue vent.  Attempted sbt today but rapid/shallow breathing and poor neck strength. Does follow cmds when sedation weaned.  - serial CXR ABG as indicated  - pulmonary toilet       GI and Nutrition :   1. Small bowel obstruction s/p ex lap 3/4 - with subsequent imaging suggestive of continued obstruction  2. Severe malnutrition  3. GERD/Carlson's   Plan  - NG tube suction  - H2 blocker   - tpn   - surgery following       Renal/Fluids/Electrolytes:   1. ?Mild BJ, slight elevation in creatinine --- improving  2. Electrolyte abnormality - hypernatremia improving  3. Acid/base status - resp alkalosis-improved  4. Volume status  Plan  - maintain hemodynamics   - monitor function and electrolytes as needed with replacement per ICU protocols.  - generally avoid nephrotoxic agents such as NSAID, IV contrast unless specifically required  - adjust medications as needed for renal clearance  - follow I/O's as appropriate.  - turn down d5 drip for improved hyperNa       Infectious Disease:   1. Aspiration pneumonia/sepsis   Plan  - check sputum, procalcitonin  - continue zosyn       Endocrine:   1. Stress induced hyperglycemia  Plan  - ICU insulin protocol, goal sugar <180  -Added insulin drip      Hematology/Oncology:   1. No active issue, empirically anticoagulated   Plan  - serial cbc, coags as indicated       IV/Access:   1. Venous access - PICC   2. Arterial access - none  Plan  - central access required and necessary      ICU Prophylaxis:   1. DVT: Hep gtt   2. VAP: HOB 30 degrees, chlorhexidine rinse  3. Stress Ulcer: H2 blocker  4. Restraints: Nonviolent soft two point restraints required and necessary for patient safety and continued cares and good effect as patient continues to pull at necessary lines, tubes despite education and distraction. Will readdress daily.   5. Wound care - per unit routine   6. Feeding - TPN   7. Family Update: pending   8. Disposition - ICU        Key goals for next 24  hours:   1.  Daily sbt  2.  Wean down d5  3.  Wean down amio               Interim History:     No events.  Hemodynamically stable.  Minimal vent settings.  Awakens and follows when sedation weaned.         Key Medications:       chlorhexidine  15 mL Mouth/Throat Q12H     famotidine  20 mg Intravenous Q24H     sodium chloride 0.9%  500 mL Intravenous Once     metoprolol  5 mg Intravenous Once     sodium chloride (PF)  3 mL Intracatheter Q8H     piperacillin-tazobactam  3.375 g Intravenous Q6H     sodium chloride (PF)  10 mL Intracatheter Q8H     amiodarone  150 mg Intravenous Once     lipids  250 mL Intravenous Every Other Day     QUEtiapine  12.5 mg Oral At Bedtime       propofol (DIPRIVAN) infusion 5 mcg/kg/min (03/08/18 1025)     parenteral nutrition - Clinimix E 70 mL/hr at 03/08/18 0910     IV fluid REPLACEMENT ONLY       insulin (regular) 0.5 Units/hr (03/08/18 1016)     amiodarone 0.5 mg/min (03/08/18 0909)     D5W 50 mL/hr at 03/08/18 0720     HEParin 1,850 Units/hr (03/08/18 0720)     IV fluid REPLACEMENT ONLY                 Physical Examination:   Temp:  [96.5  F (35.8  C)-97.9  F (36.6  C)] 97.5  F (36.4  C)  Heart Rate:  [51-66] 52  Resp:  [13-21] 21  BP: (103-128)/(53-64) 118/60  FiO2 (%):  [40 %-50 %] 40 %  SpO2:  [89 %-100 %] 98 %      Intake/Output Summary (Last 24 hours) at 03/08/18 1220  Last data filed at 03/08/18 1200   Gross per 24 hour   Intake          3856.56 ml   Output             2070 ml   Net          1786.56 ml         Wt Readings from Last 4 Encounters:   03/08/18 63.9 kg (140 lb 14 oz)   06/09/17 62.8 kg (138 lb 8 oz)   05/25/17 65.8 kg (145 lb)   12/01/15 75.3 kg (166 lb)     BP - Mean:  [68-82] 74  Ventilation Mode: PS  (Pressure Support)  FiO2 (%): 40 %  Rate Set (breaths/minute): 16 breaths/min  Tidal Volume Set (mL): 500 mL  PEEP (cm H2O): 5 cmH2O  Pressure Support (cm H2O): 10 cmH2O  Oxygen Concentration (%): 40 %  Resp: 21    Recent Labs  Lab 03/07/18 2012 03/07/18  1116  03/07/18  0800 03/06/18  1006   PH 7.49* 7.50* Canceled, Test credited 7.46*   PCO2 39 37 Canceled, Test credited 44   PO2 149* 172* Canceled, Test credited 83   HCO3 29* 29* Canceled, Test credited 32*   O2PER  --  50% Canceled, Test credited  --        GEN: no acute distress, sedated  HEENT: head ncat, sclera anicteric, OP patent, trachea midline   PULM: unlabored synchronous with vent, clear anteriorly    CV/COR: RRR S1S2 no gallop,  No rub, no murmur  ABD: soft nontender, hypoactive bowel sounds, no mass, ng draining  EXT:  No Edema,  Warm x4  NEURO: sedated, follows when weaned, moves all 4  SKIN: no obvious rash  LINES: clean, dry intact         Data:   All data and imaging reviewed     ROUTINE ICU LABS (Last four results)  CMP  Recent Labs  Lab 03/08/18 0445 03/07/18  0515 03/06/18  0857 03/06/18  0335 03/05/18  0813    149*  --  153* 152*   POTASSIUM 3.8 4.1  --  4.1 4.3   CHLORIDE 109 115*  --  116* 119*   CO2 28 32  --  34* 30   ANIONGAP 5 2*  --  3 3   * 156*  --  140* 114*   BUN 28 27  --  31* 32*   CR 1.18 1.25  --  1.37* 1.15   GFRESTIMATED 59* 55*  --  49* 60*   GFRESTBLACK 71 66  --  60* 73   TOBI 7.3* 7.5*  --  8.2* 8.0*   MAG 2.3 2.3 2.6*  --  2.3   PHOS 2.9 4.1 3.0  --   --    PROTTOTAL  --  5.3*  --   --   --    ALBUMIN  --  2.1*  --   --   --    BILITOTAL  --  1.0  --   --   --    ALKPHOS  --  51  --   --   --    AST  --  18  --   --   --    ALT  --  12  --   --   --      CBC  Recent Labs  Lab 03/08/18  0445 03/07/18  0515 03/06/18  0335 03/05/18  0813   WBC 7.7 7.7 7.1 5.1   RBC 3.38* 3.86* 4.17* 4.12*   HGB 10.9* 12.4* 13.5 13.5   HCT 34.1* 40.6 43.1 42.0   * 105* 103* 102*   MCH 32.2 32.1 32.4 32.8   MCHC 32.0 30.5* 31.3* 32.1   RDW 12.7 12.9 12.7 12.8   * 131* 147* 144*     INR  Recent Labs  Lab 03/07/18  0515   INR 1.23*     Arterial Blood Gas  Recent Labs  Lab 03/07/18 2012 03/07/18  1118 03/07/18  0800 03/06/18  1006   PH 7.49* 7.50* Canceled, Test credited  7.46*   PCO2 39 37 Canceled, Test credited 44   PO2 149* 172* Canceled, Test credited 83   HCO3 29* 29* Canceled, Test credited 32*   O2PER  --  50% Canceled, Test credited  --        All cultures:  No results for input(s): CULT in the last 168 hours.  No results found for this or any previous visit (from the past 24 hour(s)).    Labs (personally reviewed/interpreted):  Creat slowly improving 1.18; na improved 142; cbc stable.    Billing: This patient is critically ill: Yes. Total critical care time today 35 min.

## 2018-03-08 NOTE — PLAN OF CARE
Problem: Patient Care Overview  Goal: Plan of Care/Patient Progress Review  OT and PT: Patient just starting to follow some commands, however still intubated and on propofol. Rescheduling therapy for tomorrow in hopes of improved ability to participate.

## 2018-03-08 NOTE — PLAN OF CARE
Problem: Patient Care Overview  Goal: Plan of Care/Patient Progress Review  Patient did have a sedation vacation much of day shift today.  He also weaned on cpap from 12:00-16:00.  Less than adequate tidal volumes for extubation today.  Daughter Prema and wife Denisse have both been updated several times today.  Plan is to increase TPN to goal of 90, add lipids, reduce sedation, pain management and continue weaning off the vent.

## 2018-03-08 NOTE — PROGRESS NOTES
SELENE    I:  STEVE received call back from patient's daughter, Prema, who stated patient's spouse is presently at [facility] in [place] after a hospital stay last month at [facility]. Daughter stated she had been working on moving both patient and spouse into [place] when both of their hospitalizations happened.  Daughter was made aware patient will need to be evaluated by [facility] and that once their recommendations are made, decisions for placement can be discussed.  Daughter asks that if patient is recommended for TCU, patient be sent to [facility] to be closest to his spouse. Daughter is aware patient may be recommended for either LTACH or ARU also, which she would be in favor of.  SW will update daughter accordingly.     P:  Continue to assist with discharge planning as needed.    ANTTY Regalado

## 2018-03-08 NOTE — PROGRESS NOTES
CLINICAL NUTRITION SERVICES - REASSESSMENT NOTE      Recommendations Ordered by Registered Dietitian (RD):  Increase TPN to goal-   D15 AA5 @ 90 ml/hr + lipids QOD ( modified) to provide 1784 kcal, 108 g PRO (1.7 g/kg), 324 g CHO  Total including D5 and Prop = 2226 kcal (35 kcal/kg)   Malnutrition: MALNUTRITION:  % Weight Loss:  Unable to determine without recent history  % Intake:  </= 50% for >/= 5 days (severe malnutrition)  Subcutaneous Fat Loss:  Orbital region moderate depletion and Upper arm region moderate depletion  Muscle Loss:  Temporal region severe depletion and Clavicle bone region severe depletion  Fluid Retention:  None noted     Malnutrition Diagnosis: Severe malnutrition  In Context of:  Acute illness or injury  Environmental or social circumstances        EVALUATION OF PROGRESS TOWARD GOALS   Diet:  NPO  Nutrition Support:  Pt started on TPN 3/6 as is currently running at step 2 (of 3) as follows   Nutrition Support Parenteral:  Type of Access: PICC  Parenteral Frequency:  Continuous  Parenteral Regimen: D15 AA5 at 70 mL/hr + Lipids 250 mL 5x/wk (Mon-Fri) = 1550 kcals, 84 gm pro (1.3 gm/kg), 252 gm CHO.  D5 @ 50 ml/hr = 60 g CHO, 204 kcal  Prop @ 9ml/hr = 238 kcal  TPN + prop + D5 = 1992 kcal (31 kcal/kg),     Intake/tolerace:   Refeeding labs (K+, mag, Phos) all WNL  BGM <150- pt is on insulin drip  NG output-->1200 ml yesterday  Wt = 63.9 kg ( 10.9 kg drop from admit) - suspect intial wt were inaccurate as pt appears very cachectic.   Estimated needs adjusted as pt is now 88% IBW with BMI of 20.8    Dosing Weight::  63.9    ASSESSED NUTRITION NEEDS:  Energy needs: 5575-4851 kcals (30-35 kcal/kg) - malnourished, vented  Protein Needs:   grams protein (1.5-2.0 g pro/Kg) - Repletion and hypercatabolism with acute illness, possible CRF    NEW FINDINGS:   On 3/7 pt had respiratory distress and was intubated and transferred to ICU  WOC consulted- Numerus skin tears and fragile skin however, not  PI found    Previous Goals:   Pt will tolerate TPN without refeeding syndrome    Evaluation: Met    Previous Nutrition Diagnosis:   Inadequate protein-energy intake related to altered GI function s/p surgery as evidenced by limited nutritional intake over past 10 days, severe muscle wasting, and D5 IVF meeting only 16% energy and 0% protein needs.  Evaluation: Improving    CURRENT NUTRITION DIAGNOSIS  Inadequate protein intake   related to TPN not yet at goal as evidenced by meeting   87% of his PRO needs.    INTERVENTIONS  Recommendations / Nutrition Prescription  1. Increase TPN to goal-   D15 AA5 @ 90 ml/hr + lipids QOD ( modified) to provide 1784 kcal, 108 g PRO (1.7 g/kg), 324 g CHO  Total including D5 and Prop = 2226 kcal (35 kcal/kg)     Goals  TPN + D5 IVF + prop will meet % of needs    MONITORING AND EVALUATION:  Progress towards goals will be monitored and evaluated per protocol and Practice Guidelines    Pato Nuñez (Dietetic Intern)

## 2018-03-08 NOTE — PROGRESS NOTES
EP Chart Check only:    Has no previous CV hx  - s/p partial SB resection 3/4/18 and admitted with SBO 2/27. Noted to have AFib with RVR and later required intubation for respiratory failure.      Dr. Crawford (EP) consulted for AFib and recommended heparin gtt (CHADSVASc 2) and Amiodarone gtt. He's currently back in SR.    Currently on Amio gtt @ 0.5  Has not gotten digoxin (IV x 1 since 3/6)  Has not gotten metoprolol IV 2.5 q 6 hours since 3/5 PM    D/w Dr. Crawford: Will reduce drip rate of Amiodarone gtt to 0.25 mg/minute to get daily dose ~ 400 mg amiodarone    D/w JEANNE Matson. Orders placed.

## 2018-03-08 NOTE — PROGRESS NOTES
Surgery    On CPAP this morning. Resting in no distress. NG tube with less but still bilious output    Abdomen-soft with appropriate tenderness.    A/P  Improving. No changes from surgical standpoint. Continue TPN and NG tube.    Robel Gomez M.D.  [facility]  [phone]

## 2018-03-08 NOTE — PROGRESS NOTES
Care Transition Initial Assessment - SW  Reason For Consult: discharge planning  Met with: chart review   Active Problems:    Small bowel obstruction    Protein-calorie malnutrition (H)       DATA  Lives With: alone  Living Arrangements: condominium  Description of Support System: Supportive, Involved  Who is your support system?: Children, Wife  Support Assessment: Adequate family and caregiver support.   Identified issues/concerns regarding health management: SW following for d/c needs  Quality Of Family Relationships: supportive, involved  Transportation Available: car, family or friend will provide (pt reports drives at baseline, daughter transported to ED)    ASSESSMENT  Cognitive Status: unable to assess  Concerns to be addressed: Patient is a 85 year old male who was admitted to the hospital for SBO. Prior to hospitalization patient was living in a condo alone. Patient's spouse is currently in a memory care LTC. Patient was admitted to the ICU from [facility]. Patient was intubated on 3/7.  PT/OT assessed patient prior to ICU and recommended TCU. Patient is not appropriate at this time to assess for d/c planning needs. SW called daughter to introduce self/role. SW left message and awaits a c/b. SW will continue to follow and assist as needed for any d/c needs that arise.      PLAN  Financial costs for the patient includes   Patient given options and choices for discharge   Patient/family is agreeable to the plan?    Patient Goals and Preferences: TBD  Patient anticipates discharging to:  [facility]   *[phone]

## 2018-03-08 NOTE — PROGRESS NOTES
SPIRITUAL HEALTH SERVICES  Spiritual Assessment Progress Note  [facility] ICU   has been following through interdisciplinary rounds.  Pt unable to interact.  No family present.    SH remain available as needed.                                                                                                                                          Stacy Hartmann M.Div., [facility]  Staff    Pager [phone]

## 2018-03-08 NOTE — PLAN OF CARE
Problem: Patient Care Overview  Goal: Plan of Care/Patient Progress Review  Patient remains inubated and sedated.  NPO due to TPN. Insuling gtt. Initiated today with great blood glucose management.  O2 needs have decreased on vent.

## 2018-03-08 NOTE — PROGRESS NOTES
[facility] ICU RESPIRATORY NOTE  Date of Admission:2/27/2018  Date of Intubation (most recent):3/7/2018  Reason for Mechanical Ventilation:Acute hypercarbic respiratory failure   Number of Days on Mechanical Ventilation: 2  Met Criteria for Pressure Support Trial: No  Length of Pressure Support Trial: None  Reason for Stopping Pressure Support Trial:  Reason for No Pressure Support Trial: Per MD    Significant Events Today: None this shift    ABG Results:     Recent Labs  Lab 03/07/18 2012 03/07/18  1118 03/07/18  0800 03/06/18  1006   PH 7.49* 7.50* Canceled, Test credited 7.46*   PCO2 39 37 Canceled, Test credited 44   PO2 149* 172* Canceled, Test credited 83   HCO3 29* 29* Canceled, Test credited 32*   O2PER  --  50% Canceled, Test credited  --      Ventilation Mode: CMV/AC  (Continuous Mandatory Ventilation/ Assist Control)  FiO2 (%): 40 %  Rate Set (breaths/minute): 16 breaths/min  Tidal Volume Set (mL): 500 mL  PEEP (cm H2O): 5 cmH2O  Oxygen Concentration (%): 40 %  Resp: 16      ETT appearance on chest x-ray: ETT in good position    Plan:  Continue full ventilatory support and assess for weaning in the AM.  3/8/2018  Josy Pizano

## 2018-03-08 NOTE — PROGRESS NOTES
[facility] ICU RESPIRATORY NOTE  Date of Admission: 2/27/2018  Date of Intubation (most recent): 3/7/2018  Reason for Mechanical Ventilation: Acute hypercarbic respiratory failure   Number of Days on Mechanical Ventilation: 1  Met Criteria for Pressure Support Trial: No  Length of Pressure Support Trial: None  Reason for No Pressure Support Trial: Per MD    Significant Events Today: Pt intubated  Ventilation Mode: CMV/AC  (Continuous Mandatory Ventilation/ Assist Control)  FiO2 (%): 40 %  Rate Set (breaths/minute): 16 breaths/min  Tidal Volume Set (mL): 500 mL  PEEP (cm H2O): 5 cmH2O  Oxygen Concentration (%): 40 %  Resp: 13    ABG Results:     Recent Labs  Lab 03/07/18 2012 03/07/18  1118 03/07/18  0800 03/06/18  1006   PH 7.49* 7.50* Canceled, Test credited 7.46*   PCO2 39 37 Canceled, Test credited 44   PO2 149* 172* Canceled, Test credited 83   HCO3 29* 29* Canceled, Test credited 32*   O2PER  --  50% Canceled, Test credited  --        ETT appearance on chest x-ray: ET tube in good position    Plan:  Continue full mechanical ventilatory support overnight

## 2018-03-08 NOTE — PLAN OF CARE
Problem: Patient Care Overview  Goal: Plan of Care/Patient Progress Review  Outcome: Improving  Neuro: pt moves all extremities to command, and answers yes/no questions appropriately. Resp: pt resting comfortably on vent with minimal propofol. Breath sounds coarse to fine crackles in bases. Suctioning minimal secretions. Cardio: pt in sinus rhythm during shift with appropriate BP. GI/ pt remains NPO with 900mls out of NG this shift. Hypoactive bowel sounds. Adequate UOP. Skin: multiple bruising and skin tares as noted.

## 2018-03-09 LAB
ANION GAP SERPL CALCULATED.3IONS-SCNC: 4 MMOL/L (ref 3–14)
ANION GAP SERPL CALCULATED.3IONS-SCNC: 5 MMOL/L (ref 3–14)
BUN SERPL-MCNC: 26 MG/DL (ref 7–30)
BUN SERPL-MCNC: 27 MG/DL (ref 7–30)
CALCIUM SERPL-MCNC: 7.2 MG/DL (ref 8.5–10.1)
CALCIUM SERPL-MCNC: 7.5 MG/DL (ref 8.5–10.1)
CHLORIDE SERPL-SCNC: 109 MMOL/L (ref 94–109)
CHLORIDE SERPL-SCNC: 109 MMOL/L (ref 94–109)
CO2 SERPL-SCNC: 28 MMOL/L (ref 20–32)
CO2 SERPL-SCNC: 28 MMOL/L (ref 20–32)
CREAT SERPL-MCNC: 1.04 MG/DL (ref 0.66–1.25)
CREAT SERPL-MCNC: 1.1 MG/DL (ref 0.66–1.25)
ERYTHROCYTE [DISTWIDTH] IN BLOOD BY AUTOMATED COUNT: 12.7 % (ref 10–15)
GFR SERPL CREATININE-BSD FRML MDRD: 64 ML/MIN/1.7M2
GFR SERPL CREATININE-BSD FRML MDRD: 68 ML/MIN/1.7M2
GLUCOSE BLDC GLUCOMTR-MCNC: 117 MG/DL (ref 70–99)
GLUCOSE BLDC GLUCOMTR-MCNC: 119 MG/DL (ref 70–99)
GLUCOSE BLDC GLUCOMTR-MCNC: 124 MG/DL (ref 70–99)
GLUCOSE BLDC GLUCOMTR-MCNC: 131 MG/DL (ref 70–99)
GLUCOSE BLDC GLUCOMTR-MCNC: 132 MG/DL (ref 70–99)
GLUCOSE BLDC GLUCOMTR-MCNC: 133 MG/DL (ref 70–99)
GLUCOSE BLDC GLUCOMTR-MCNC: 139 MG/DL (ref 70–99)
GLUCOSE SERPL-MCNC: 119 MG/DL (ref 70–99)
GLUCOSE SERPL-MCNC: 133 MG/DL (ref 70–99)
HBA1C MFR BLD: 5.4 % (ref 4.3–6)
HCT VFR BLD AUTO: 32.6 % (ref 40–53)
HGB BLD-MCNC: 10.6 G/DL (ref 13.3–17.7)
LMWH PPP CHRO-ACNC: 0.32 IU/ML
LMWH PPP CHRO-ACNC: 0.54 IU/ML
MAGNESIUM SERPL-MCNC: 2.1 MG/DL (ref 1.6–2.3)
MCH RBC QN AUTO: 32.4 PG (ref 26.5–33)
MCHC RBC AUTO-ENTMCNC: 32.5 G/DL (ref 31.5–36.5)
MCV RBC AUTO: 100 FL (ref 78–100)
PHOSPHATE SERPL-MCNC: 2.6 MG/DL (ref 2.5–4.5)
PLATELET # BLD AUTO: 122 10E9/L (ref 150–450)
POTASSIUM SERPL-SCNC: 3.7 MMOL/L (ref 3.4–5.3)
POTASSIUM SERPL-SCNC: 4 MMOL/L (ref 3.4–5.3)
RBC # BLD AUTO: 3.27 10E12/L (ref 4.4–5.9)
SODIUM SERPL-SCNC: 141 MMOL/L (ref 133–144)
SODIUM SERPL-SCNC: 142 MMOL/L (ref 133–144)
WBC # BLD AUTO: 7.9 10E9/L (ref 4–11)

## 2018-03-09 PROCEDURE — 25000128 H RX IP 250 OP 636: Performed by: INTERNAL MEDICINE

## 2018-03-09 PROCEDURE — 00000146 ZZHCL STATISTIC GLUCOSE BY METER IP

## 2018-03-09 PROCEDURE — 84100 ASSAY OF PHOSPHORUS: CPT | Performed by: INTERNAL MEDICINE

## 2018-03-09 PROCEDURE — 83036 HEMOGLOBIN GLYCOSYLATED A1C: CPT | Performed by: INTERNAL MEDICINE

## 2018-03-09 PROCEDURE — 85520 HEPARIN ASSAY: CPT | Performed by: HOSPITALIST

## 2018-03-09 PROCEDURE — 40000239 ZZH STATISTIC VAT ROUNDS

## 2018-03-09 PROCEDURE — 40000275 ZZH STATISTIC RCP TIME EA 10 MIN

## 2018-03-09 PROCEDURE — 83735 ASSAY OF MAGNESIUM: CPT | Performed by: INTERNAL MEDICINE

## 2018-03-09 PROCEDURE — 20000003 ZZH R&B ICU

## 2018-03-09 PROCEDURE — 80048 BASIC METABOLIC PNL TOTAL CA: CPT | Performed by: INTERNAL MEDICINE

## 2018-03-09 PROCEDURE — 94003 VENT MGMT INPAT SUBQ DAY: CPT

## 2018-03-09 PROCEDURE — 99291 CRITICAL CARE FIRST HOUR: CPT | Performed by: INTERNAL MEDICINE

## 2018-03-09 PROCEDURE — G0463 HOSPITAL OUTPT CLINIC VISIT: HCPCS

## 2018-03-09 PROCEDURE — 25000128 H RX IP 250 OP 636: Performed by: PHYSICIAN ASSISTANT

## 2018-03-09 PROCEDURE — 85520 HEPARIN ASSAY: CPT | Performed by: INTERNAL MEDICINE

## 2018-03-09 PROCEDURE — 85027 COMPLETE CBC AUTOMATED: CPT | Performed by: INTERNAL MEDICINE

## 2018-03-09 PROCEDURE — 25000125 ZZHC RX 250

## 2018-03-09 PROCEDURE — 25000128 H RX IP 250 OP 636: Performed by: HOSPITALIST

## 2018-03-09 PROCEDURE — 40000008 ZZH STATISTIC AIRWAY CARE

## 2018-03-09 PROCEDURE — 25000125 ZZHC RX 250: Performed by: HOSPITALIST

## 2018-03-09 PROCEDURE — 25000128 H RX IP 250 OP 636

## 2018-03-09 RX ORDER — FUROSEMIDE 10 MG/ML
20 INJECTION INTRAMUSCULAR; INTRAVENOUS ONCE
Status: COMPLETED | OUTPATIENT
Start: 2018-03-09 | End: 2018-03-09

## 2018-03-09 RX ORDER — NICOTINE POLACRILEX 4 MG
15-30 LOZENGE BUCCAL
Status: DISCONTINUED | OUTPATIENT
Start: 2018-03-09 | End: 2018-03-09

## 2018-03-09 RX ORDER — PIPERACILLIN SODIUM, TAZOBACTAM SODIUM 4; .5 G/20ML; G/20ML
4.5 INJECTION, POWDER, LYOPHILIZED, FOR SOLUTION INTRAVENOUS EVERY 6 HOURS
Status: DISCONTINUED | OUTPATIENT
Start: 2018-03-09 | End: 2018-03-11

## 2018-03-09 RX ORDER — DEXTROSE MONOHYDRATE 25 G/50ML
25-50 INJECTION, SOLUTION INTRAVENOUS
Status: DISCONTINUED | OUTPATIENT
Start: 2018-03-09 | End: 2018-03-09

## 2018-03-09 RX ADMIN — FAMOTIDINE 20 MG: 10 INJECTION, SOLUTION INTRAVENOUS at 06:13

## 2018-03-09 RX ADMIN — PROPOFOL 25 MCG/KG/MIN: 10 INJECTION, EMULSION INTRAVENOUS at 05:48

## 2018-03-09 RX ADMIN — CHLORHEXIDINE GLUCONATE 15 ML: 1.2 RINSE ORAL at 19:50

## 2018-03-09 RX ADMIN — HEPARIN SODIUM 1700 UNITS/HR: 10000 INJECTION, SOLUTION INTRAVENOUS at 20:30

## 2018-03-09 RX ADMIN — HEPARIN SODIUM 1850 UNITS/HR: 10000 INJECTION, SOLUTION INTRAVENOUS at 04:34

## 2018-03-09 RX ADMIN — ASCORBIC ACID, VITAMIN A PALMITATE, CHOLECALCIFEROL, THIAMINE HYDROCHLORIDE, RIBOFLAVIN-5 PHOSPHATE SODIUM, PYRIDOXINE HYDROCHLORIDE, NIACINAMIDE, DEXPANTHENOL, ALPHA-TOCOPHEROL ACETATE, VITAMIN K1, FOLIC ACID, BIOTIN, CYANOCOBALAMIN: 200; 3300; 200; 6; 3.6; 6; 40; 15; 10; 150; 600; 60; 5 INJECTION, SOLUTION INTRAVENOUS at 09:58

## 2018-03-09 RX ADMIN — PROPOFOL 25 MCG/KG/MIN: 10 INJECTION, EMULSION INTRAVENOUS at 15:00

## 2018-03-09 RX ADMIN — PIPERACILLIN SODIUM,TAZOBACTAM SODIUM 4.5 G: 4; .5 INJECTION, POWDER, FOR SOLUTION INTRAVENOUS at 09:29

## 2018-03-09 RX ADMIN — PIPERACILLIN SODIUM,TAZOBACTAM SODIUM 4.5 G: 4; .5 INJECTION, POWDER, FOR SOLUTION INTRAVENOUS at 20:30

## 2018-03-09 RX ADMIN — PIPERACILLIN SODIUM,TAZOBACTAM SODIUM 3.38 G: 3; .375 INJECTION, POWDER, FOR SOLUTION INTRAVENOUS at 04:31

## 2018-03-09 RX ADMIN — PIPERACILLIN SODIUM,TAZOBACTAM SODIUM 4.5 G: 4; .5 INJECTION, POWDER, FOR SOLUTION INTRAVENOUS at 15:58

## 2018-03-09 RX ADMIN — FUROSEMIDE 20 MG: 10 INJECTION, SOLUTION INTRAVENOUS at 09:29

## 2018-03-09 RX ADMIN — CHLORHEXIDINE GLUCONATE 15 ML: 1.2 RINSE ORAL at 08:17

## 2018-03-09 RX ADMIN — Medication 0.25 MG/MIN: at 17:36

## 2018-03-09 NOTE — PROGRESS NOTES
"General Surgery Progress Note    On vent, amiodarone and heparin drip.  Vitals: Blood pressure 112/56, pulse 145, temperature 98.5  F (36.9  C), temperature source Axillary, resp. rate 25, height 5' 9\" (1.753 m), weight 145 lb 4.5 oz (65.9 kg), SpO2 100 %.  Temperature Temp  Av.3  F (36.8  C)  Min: 98.1  F (36.7  C)  Max: 98.9  F (37.2  C)   I/O last 3 completed shifts:  In: 4164.14 [I.V.:.]  Out: 2660 [Urine:; Emesis/NG output:625]    Abd: soft, mild distention.  Wound(s): c/d/i, no erythema or drainge.  Ext: No edema. +SCDs.    Recent Labs   Lab Test  18   0429  18   0445  18   0515   WBC  7.9  7.7  7.7   HGB  10.6*  10.9*  12.4*   HCT  32.6*  34.1*  40.6   PLT  122*  126*  131*      Recent Labs   Lab Test  18   0429  18   0445  18   0515   NA  142  142  149*   POTASSIUM  3.7  3.8  4.1   CHLORIDE  109  109  115*   CO2  28  28  32   BUN  26  28  27   CR  1.04  1.18  1.25     85 year old male S/P Exploratory laparoscopy, TISHA and lap assisted SB rsxn, POD 5.  - Continue NGT and TPN  - Await bowel fxn  - On Zosyn for pnemonia  - Continue ICU cares, appreciate help    Latha Nelson PA-C  [facility]  [phone]  "

## 2018-03-09 NOTE — PLAN OF CARE
Problem: Patient Care Overview  Goal: Plan of Care/Patient Progress Review  OT and PT: Per discussion with RN, pt not appropriate for session today

## 2018-03-09 NOTE — PROGRESS NOTES
[facility] ICU RESPIRATORY NOTE  Date of Admission: 2/27/18  Date of Intubation (most recent): 3/7/18  Reason for Mechanical Ventilation: Respiratory failure  Number of Days on Mechanical Ventilation: 3  Met Criteria for Pressure Support Trial: Yes  Length of Pressure Support Trial: PS 5/5 attempted, lasted a few minutes, Vt's 100-150mL.  Discussed with Dr. Abreu.    Ventilation Mode: CMV/AC  (Continuous Mandatory Ventilation/ Assist Control)  FiO2 (%): 40 %  Rate Set (breaths/minute): 16 breaths/min  Tidal Volume Set (mL): 450 mL  PEEP (cm H2O): 5 cmH2O  Pressure Support (cm H2O): 10 cmH2O  Oxygen Concentration (%): 40 %  Resp: 28    Significant Events Today:  Patient has a scab on left upper lip.  Discussed with RN.  Wound care consult placed.  Continue Q4 Airway cares avoiding ETT rotation to left side of the mouth.    ABG Results:      Recent Labs  Lab 03/07/18 2012 03/07/18  1118 03/07/18  0800 03/06/18  1006   PH 7.49* 7.50* Canceled, Test credited 7.46*   PCO2 39 37 Canceled, Test credited 44   PO2 149* 172* Canceled, Test credited 83   HCO3 29* 29* Canceled, Test credited 32*   O2PER  --  50% Canceled, Test credited  --        ETT appearance on chest x-ray:    Plan:    Continue ventilatory support.  Assess for weaning readiness daily

## 2018-03-09 NOTE — PLAN OF CARE
Problem: Patient Care Overview  Goal: Plan of Care/Patient Progress Review  Outcome: No Change  Patient unable to pressure support, pulling low volumes. Up to chair x3 hours. Remains sedated and intubated.

## 2018-03-09 NOTE — PLAN OF CARE
Problem: Patient Care Overview  Goal: Plan of Care/Patient Progress Review  Outcome: No Change  Neuro: Follows commands. LUTHER. PERRL. Sedated on propofol- had to increase due to agitation and trying to get out of bed. Able to slowly wean down to 25mcg/kg/min.   Cardiac: SB throughout night. BP stable. Pulses palpable. Heparin infusing. Amio gtt.   Resp: Remained on full vent support. Lungs clear. Minimal secretions out of ETT.   GI: NG in place- 100cc bile, green output. Insulin gtt. Bowel sounds hypoactive, very faint.   : Segal in place, good UOP.     TPN infusing. Lipids given overnight. Multiple skin tears on skin.

## 2018-03-09 NOTE — PROGRESS NOTES
Critical Care Progress Note      03/09/2018    Name: Joseluis Falcon MRN#: [id number]   Age: 85 year old YOB: 1932     Hsptl Day# 10  ICU DAY #    MV DAY #             Problem List:   Active Problems:    Small bowel obstruction    Protein-calorie malnutrition (H)           Summary/Hospital Course:   85M initially admitted for SBO, managed non-operatively at first but then had surgery on 3/4. Decompensated with encephalopathy and respiratory failure night of 3/6, intubated and transferred to ICU.  Since being in the ICU he has remained stable.  We have weaned his ventilator and amiodarone drips.  He continues on heparin and tpn.        Assessment and plan :     Joseluis Falcon IS a 85 year old male admitted on 2/27/2018 for respiratory failure.   I have personally reviewed the daily labs, imaging studies, cultures and discussed the case with referring physician and consulting physicians.     My assessment and plan by system for this patient is as follows:    Neurology/Psychiatry:   1. Encephalopathy/agitation - related to hypoxemia -? Evolving sepsis +/- delirium   2. Analgesia/Anxiety  Plan  - propofol gtt , goal RASS  0   - prn versed   - prn dilaudid    - continue olanzapine      Cardiovascular:   1.Hemodynamics -currently appropriate   2.Rhythm - atrial flutter with RVR on anticoagulation  Plan  - follow hemodynamics, goal MAP >65, no pressors yet  - lactate resolved  - now back in sinus rhythm.  Amio drip reduced to 0.25/min.  This is dose equivalent to 400 PO per day.  Will maintain here.  Appreciate EP input.  - continue digoxin/metoprolol - though defer to EP   - trop ok--stop trending  - continue heparin gtt for afib      Pulmonary/Ventilator Management:   1. Acute hypercarbic respiratory failure - in setting of recent surgery, likely aspiration with evidence of REJI physiology on initial eval - oxygenation/mechanics satisfactory on vent,   2. Aspiration pneumonia  3. 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  Respiratory alkalosis - improved with vent changes  Plan  - continue vent.  Attempted sbt today but rapid/shallow breathing and poor neck strength. Does follow cmds when sedation weaned.  - serial CXR ABG as indicated  - pulmonary toilet       GI and Nutrition :   1. Small bowel obstruction s/p ex lap 3/4 - with subsequent imaging suggestive of continued obstruction  2. Severe malnutrition  3. GERD/Carlson's   Plan  - NG tube suction  - H2 blocker   - tpn   - surgery following       Renal/Fluids/Electrolytes:   1. ?Mild BJ, slight elevation in creatinine --- resolved  2. Electrolyte abnormality - hypernatremia resolved  3. Acid/base status - resp alkalosis-improved  4. Volume status - gentle diuresis.  Goal even.  Plan  - maintain hemodynamics   - monitor function and electrolytes as needed with replacement per ICU protocols.  - generally avoid nephrotoxic agents such as NSAID, IV contrast unless specifically required  - adjust medications as needed for renal clearance  - follow I/O's as appropriate  - turn off d5 drip (hyperNa resolved)  -gentle diuresis --goal even       Infectious Disease:   1. Aspiration pneumonia/sepsis   Plan  - check sputum, procalcitonin  - continue zosyn       Endocrine:   1. Stress induced hyperglycemia  Plan  - ICU insulin protocol, goal sugar <180  -transitioned insulin drip to ssi today      Hematology/Oncology:   1. No active issue, empirically anticoagulated   Plan  - serial cbc, coags as indicated       IV/Access:   1. Venous access - PICC   2. Arterial access - none  Plan  - central access required and necessary      ICU Prophylaxis:   1. DVT: Hep gtt   2. VAP: HOB 30 degrees, chlorhexidine rinse  3. Stress Ulcer: H2 blocker  4. Restraints: Nonviolent soft two point restraints required and necessary for patient safety and continued cares and good effect as patient continues to pull at necessary lines, tubes despite education and distraction. Will readdress daily.   5.  Wound care - per unit routine   6. Feeding - TPN   7. Family Update: pending   8. Disposition - ICU        Key goals for next 24 hours:   1.  Daily sbt  2.  Wean off d5  3.  Transition to ssi  4.  Gentle diuresis.         Interim History:     No events.  Hemodynamically stable.  Minimal vent settings.  Awakens and follows when sedation weaned.  Sodium and fsg improving.         Key Medications:       piperacillin-tazobactam  4.5 g Intravenous Q6H     chlorhexidine  15 mL Mouth/Throat Q12H     famotidine  20 mg Intravenous Q24H     sodium chloride 0.9%  500 mL Intravenous Once     metoprolol  5 mg Intravenous Once     sodium chloride (PF)  3 mL Intracatheter Q8H     sodium chloride (PF)  10 mL Intracatheter Q8H     amiodarone  150 mg Intravenous Once     lipids  250 mL Intravenous Every Other Day     QUEtiapine  12.5 mg Oral At Bedtime       parenteral nutrition - Clinimix E 90 mL/hr at 03/09/18 0829     propofol (DIPRIVAN) infusion 25 mcg/kg/min (03/09/18 0829)     IV fluid REPLACEMENT ONLY       insulin (regular) 0.2 Units/hr (03/09/18 0827)     amiodarone 0.25 mg/min (03/09/18 0827)     D5W 25 mL/hr at 03/09/18 0827     HEParin 1,700 Units/hr (03/09/18 0827)     IV fluid REPLACEMENT ONLY                 Physical Examination:   Temp:  [98.1  F (36.7  C)-98.9  F (37.2  C)] 98.5  F (36.9  C)  Heart Rate:  [51-61] 56  Resp:  [13-27] 25  BP: ()/(49-77) 112/56  FiO2 (%):  [40 %] 40 %  SpO2:  [93 %-100 %] 100 %        Intake/Output Summary (Last 24 hours) at 03/09/18 0901  Last data filed at 03/09/18 0800   Gross per 24 hour   Intake          3988.14 ml   Output             2780 ml   Net          1208.14 ml       Wt Readings from Last 4 Encounters:   03/09/18 65.9 kg (145 lb 4.5 oz)   06/09/17 62.8 kg (138 lb 8 oz)   05/25/17 65.8 kg (145 lb)   12/01/15 75.3 kg (166 lb)     BP - Mean:  [59-83] 71  Ventilation Mode: CMV/AC  (Continuous Mandatory Ventilation/ Assist Control)  FiO2 (%): 40 %  Rate Set  (breaths/minute): 16 breaths/min  Tidal Volume Set (mL): 450 mL  PEEP (cm H2O): 5 cmH2O  Pressure Support (cm H2O): 10 cmH2O  Oxygen Concentration (%): 40 %  Resp: 25    Recent Labs  Lab 03/07/18 2012 03/07/18  1118 03/07/18  0800 03/06/18  1006   PH 7.49* 7.50* Canceled, Test credited 7.46*   PCO2 39 37 Canceled, Test credited 44   PO2 149* 172* Canceled, Test credited 83   HCO3 29* 29* Canceled, Test credited 32*   O2PER  --  50% Canceled, Test credited  --        GEN: no acute distress, sedated  HEENT: head ncat, sclera anicteric, OP patent, trachea midline   PULM: unlabored synchronous with vent, clear anteriorly    CV/COR: RRR S1S2 no gallop,  No rub, no murmur  ABD: soft nontender, hypoactive bowel sounds, no mass, ng draining  EXT:  No Edema,  Warm x4  NEURO: sedated, follows when weaned, moves all 4  SKIN: no obvious rash  LINES: clean, dry intact         Data:   All data and imaging reviewed     ROUTINE ICU LABS (Last four results)  CMP    Recent Labs  Lab 03/09/18  0429 03/08/18  0445 03/07/18  0515 03/06/18  0857 03/06/18  0335    142 149*  --  153*   POTASSIUM 3.7 3.8 4.1  --  4.1   CHLORIDE 109 109 115*  --  116*   CO2 28 28 32  --  34*   ANIONGAP 5 5 2*  --  3   * 131* 156*  --  140*   BUN 26 28 27  --  31*   CR 1.04 1.18 1.25  --  1.37*   GFRESTIMATED 68 59* 55*  --  49*   GFRESTBLACK 82 71 66  --  60*   TOBI 7.2* 7.3* 7.5*  --  8.2*   MAG 2.1 2.3 2.3 2.6*  --    PHOS 2.6 2.9 4.1 3.0  --    PROTTOTAL  --   --  5.3*  --   --    ALBUMIN  --   --  2.1*  --   --    BILITOTAL  --   --  1.0  --   --    ALKPHOS  --   --  51  --   --    AST  --   --  18  --   --    ALT  --   --  12  --   --      CBC    Recent Labs  Lab 03/09/18  0429 03/08/18  0445 03/07/18  0515 03/06/18  0335   WBC 7.9 7.7 7.7 7.1   RBC 3.27* 3.38* 3.86* 4.17*   HGB 10.6* 10.9* 12.4* 13.5   HCT 32.6* 34.1* 40.6 43.1    101* 105* 103*   MCH 32.4 32.2 32.1 32.4   MCHC 32.5 32.0 30.5* 31.3*   RDW 12.7 12.7 12.9 12.7   PLT  122* 126* 131* 147*     INR    Recent Labs  Lab 03/07/18  0515   INR 1.23*     Arterial Blood Gas    Recent Labs  Lab 03/07/18 2012 03/07/18  1118 03/07/18  0800 03/06/18  1006   PH 7.49* 7.50* Canceled, Test credited 7.46*   PCO2 39 37 Canceled, Test credited 44   PO2 149* 172* Canceled, Test credited 83   HCO3 29* 29* Canceled, Test credited 32*   O2PER  --  50% Canceled, Test credited  --        All cultures:  No results for input(s): CULT in the last 168 hours.  No results found for this or any previous visit (from the past 24 hour(s)).    Labs (personally reviewed/interpreted):  Creat slowly improving 1.04; na improved 142; cbc stable.    Billing: This patient is critically ill: Yes. Total critical care time today 30 min.

## 2018-03-09 NOTE — PROGRESS NOTES
Met with patient's daughter to discuss status and prognosis.  Overall it seems he was a relatively healthy and independent 85 prior to this hospitalization, though she feels he had been starting to have some trouble with cognitive functions (mainly managing finances).  Since coming to the ICU he has been slowly making progress on the ventilator and has only been intubated for a few days so I remain hopeful he will be able to extubate once his pneumonia improves, however I explained that because of his advanced age and comorbidities (recent sbo) his recovery may not go smoothly.  I also explained that if he is not extubatable within about 2 weeks we would need to talk more about what his goals of care would be in this situation, as continued ventilation at that point would probably be indication for a tracheostomy.  While it is premature to make any firm decisions about tracheostomy, his daughter indicated that should such a situation arise they would be more likely to transition to comfort measures than proceed with trach.  We will continue full cares for the time being, including vent support, antibiotics and diuresis, and continue to work toward extubation.

## 2018-03-09 NOTE — PROGRESS NOTES
[facility]   WO Nurse Inpatient Skin Assessment     Initial Assessment of:  Lt upper lip        Data:   Patient History:      per MD note(s):  86 yo male hx of GERD, barretts esophagus and skin cancer admitted  with small bowel obstruction. Initially managed conservatively however ultimately required operative investigation 3/4 where he underwent lysis of adhesions and partial small bowel resection.  His post op course was complicated by respiratory distress, agitation and SVT, atrial flutter for which he started on amiodarone via EP.  Additional imaging post op suggested continued bowel obstruction. RRT called yesterday for respiratory distress with CT Chest obtained 3/6 for respiratory distress negative for PE but mild emphysema , basilar fibrosis and possible pneumonia for he was started on zosyn.  TPN started 3/6      Earlier the evening of 3/6 nursing noted patient increased coarse breath sounds and desat, nasal trumpet placed for NT suctioned with transient improvement.  This AM was minimally arousable with desaturation not responsive to suctioning.  RRT called with patient found to be obstructing airway thus anesthesia called and patient intubated and transferred to the ICU.      On arrival to ICU patient sedate, hemodynamically appropriate, though not following commands.  VBG pre intubation demonstrated respiratory acidosis. NG output dark bilious - unchanged from prior per floor nurse.       Damian Assessment and sub scores:   Damian Score  Av.1  Min: 15  Max: 19    Positioning: Pillows,     Mattress:  Standard , Atmos Air mattress      Moisture Management:  Urinary Catheter    Catheter secured? Yes      Current Diet / Nutrition:     Active Diet Order      NPO for Medical/Clinical Reasons Except for: Meds      Labs:   Recent Labs   Lab Test  18   0429   18   0515   ALBUMIN   --    --   2.1*   HGB  10.6*   < >  12.4*   INR   --    --   1.23*   WBC  7.9   < >  7.7    < >  = values in this interval not displayed.           Skin Assessment (location): Lt upper lip  History:  Frail, fragile skin. Pt currently intubated  Nursing noted  Dry bloody scabbed area on Lt upper lip.  No drainage. No odor. Pt was intubated on 3-7 and WOC consult placed early on 3-9.           Intervention:     Patient's chart evaluated.      Assessed: Lt upper lip    Orders  updated  Supplies  discussed with RN    Discussed plan of care with Nurse          Assessment:       Lt upper lip: Dried scab Rt upper lip. ? Etiology but likely trauma not PI related   Pt intubated on 3-7 with WOC consult early on 3-9. Doubt area r/t to ET tube        Plan:   Nursing to notify the Provider(s) and re-consult the WOC Nurse if skin deteriorate(s).  WOC Nurse will return: no need to continue to follow        Lt upper lip:      1. Moisturize BID with vaseline      2 NO positioning ET tube over area (move mid and Rt only         Order updated to reflect above       Will F/U on Monday to re-evaluate area

## 2018-03-09 NOTE — PROGRESS NOTES
[facility] ICU RESPIRATORY NOTE  Date of Admission:  2/27/18  Date of Intubation (most recent):  3/7/18  Reason for Mechanical Ventilation:  Resp failure  Number of Days on Mechanical Ventilation:  2  Met Criteria for Pressure Support Trial:  Yes  Length of Pressure Support Trial:  10/5 for 4 hours  Reason for Stopping Pressure Support Trial:  Per MD  Reason for No Pressure Support Trial:    Significant Events Today:  None    ABG Results:    ETT appearance on chest x-ray:    Plan:  Pt to remain on full vent support overnight

## 2018-03-09 NOTE — PROGRESS NOTES
[facility] ICU RESPIRATORY NOTE  Date of Admission:  2/27/18  Date of Intubation (most recent):  3/7/18  Reason for Mechanical Ventilation:  Resp failure  Number of Days on Mechanical Ventilation:  2  Met Criteria for Pressure Support Trial:  Yes  Length of Pressure Support Trial:  10/5 for 4 hours  Reason for Stopping Pressure Support Trial:  Per MD  Reason for No Pressure Support Trial:     Significant Events Today:  None   Pt remains on the following vent settings:    Ventilation Mode: CMV/AC  (Continuous Mandatory Ventilation/ Assist Control)  FiO2 (%): 40 %  Rate Set (breaths/minute): 16 breaths/min  Tidal Volume Set (mL): 450 mL  PEEP (cm H2O): 5 cmH2O  Pressure Support (cm H2O): 10 cmH2O  Oxygen Concentration (%): 40 %  Resp: 23      Recent Labs  Lab 03/07/18 2012 03/07/18  1118 03/07/18  0800 03/06/18  1006   PH 7.49* 7.50* Canceled, Test credited 7.46*   PCO2 39 37 Canceled, Test credited 44   PO2 149* 172* Canceled, Test credited 83   HCO3 29* 29* Canceled, Test credited 32*   O2PER  --  50% Canceled, Test credited  --      Plan: Assess for daily pressure support trials.     3/9/2018  Lauren Monet RRT

## 2018-03-10 LAB
ANION GAP SERPL CALCULATED.3IONS-SCNC: 4 MMOL/L (ref 3–14)
ANION GAP SERPL CALCULATED.3IONS-SCNC: 4 MMOL/L (ref 3–14)
BUN SERPL-MCNC: 27 MG/DL (ref 7–30)
BUN SERPL-MCNC: 28 MG/DL (ref 7–30)
CALCIUM SERPL-MCNC: 7.3 MG/DL (ref 8.5–10.1)
CALCIUM SERPL-MCNC: 7.4 MG/DL (ref 8.5–10.1)
CHLORIDE SERPL-SCNC: 108 MMOL/L (ref 94–109)
CHLORIDE SERPL-SCNC: 109 MMOL/L (ref 94–109)
CO2 SERPL-SCNC: 28 MMOL/L (ref 20–32)
CO2 SERPL-SCNC: 29 MMOL/L (ref 20–32)
CREAT SERPL-MCNC: 1.01 MG/DL (ref 0.66–1.25)
CREAT SERPL-MCNC: 1.03 MG/DL (ref 0.66–1.25)
ERYTHROCYTE [DISTWIDTH] IN BLOOD BY AUTOMATED COUNT: 12.8 % (ref 10–15)
ERYTHROCYTE [DISTWIDTH] IN BLOOD BY AUTOMATED COUNT: 12.8 % (ref 10–15)
GFR SERPL CREATININE-BSD FRML MDRD: 69 ML/MIN/1.7M2
GFR SERPL CREATININE-BSD FRML MDRD: 70 ML/MIN/1.7M2
GLUCOSE BLDC GLUCOMTR-MCNC: 113 MG/DL (ref 70–99)
GLUCOSE BLDC GLUCOMTR-MCNC: 115 MG/DL (ref 70–99)
GLUCOSE BLDC GLUCOMTR-MCNC: 121 MG/DL (ref 70–99)
GLUCOSE BLDC GLUCOMTR-MCNC: 122 MG/DL (ref 70–99)
GLUCOSE BLDC GLUCOMTR-MCNC: 129 MG/DL (ref 70–99)
GLUCOSE BLDC GLUCOMTR-MCNC: 131 MG/DL (ref 70–99)
GLUCOSE SERPL-MCNC: 130 MG/DL (ref 70–99)
GLUCOSE SERPL-MCNC: 142 MG/DL (ref 70–99)
HCT VFR BLD AUTO: 31.5 % (ref 40–53)
HCT VFR BLD AUTO: 31.5 % (ref 40–53)
HGB BLD-MCNC: 10.2 G/DL (ref 13.3–17.7)
HGB BLD-MCNC: 10.2 G/DL (ref 13.3–17.7)
LMWH PPP CHRO-ACNC: 0.35 IU/ML
LMWH PPP CHRO-ACNC: 0.42 IU/ML
LMWH PPP CHRO-ACNC: 0.42 IU/ML
MAGNESIUM SERPL-MCNC: 2.1 MG/DL (ref 1.6–2.3)
MCH RBC QN AUTO: 32.1 PG (ref 26.5–33)
MCH RBC QN AUTO: 32.2 PG (ref 26.5–33)
MCHC RBC AUTO-ENTMCNC: 32.4 G/DL (ref 31.5–36.5)
MCHC RBC AUTO-ENTMCNC: 32.4 G/DL (ref 31.5–36.5)
MCV RBC AUTO: 99 FL (ref 78–100)
MCV RBC AUTO: 99 FL (ref 78–100)
PLATELET # BLD AUTO: 139 10E9/L (ref 150–450)
PLATELET # BLD AUTO: 141 10E9/L (ref 150–450)
POTASSIUM SERPL-SCNC: 4 MMOL/L (ref 3.4–5.3)
POTASSIUM SERPL-SCNC: 4.1 MMOL/L (ref 3.4–5.3)
RBC # BLD AUTO: 3.17 10E12/L (ref 4.4–5.9)
RBC # BLD AUTO: 3.18 10E12/L (ref 4.4–5.9)
SODIUM SERPL-SCNC: 140 MMOL/L (ref 133–144)
SODIUM SERPL-SCNC: 142 MMOL/L (ref 133–144)
WBC # BLD AUTO: 12.8 10E9/L (ref 4–11)
WBC # BLD AUTO: 8.8 10E9/L (ref 4–11)

## 2018-03-10 PROCEDURE — 80048 BASIC METABOLIC PNL TOTAL CA: CPT | Performed by: HOSPITALIST

## 2018-03-10 PROCEDURE — 25000125 ZZHC RX 250

## 2018-03-10 PROCEDURE — 85520 HEPARIN ASSAY: CPT | Performed by: HOSPITALIST

## 2018-03-10 PROCEDURE — 85027 COMPLETE CBC AUTOMATED: CPT | Performed by: INTERNAL MEDICINE

## 2018-03-10 PROCEDURE — 20000003 ZZH R&B ICU

## 2018-03-10 PROCEDURE — 40000275 ZZH STATISTIC RCP TIME EA 10 MIN

## 2018-03-10 PROCEDURE — 99291 CRITICAL CARE FIRST HOUR: CPT | Performed by: PHYSICIAN ASSISTANT

## 2018-03-10 PROCEDURE — 83735 ASSAY OF MAGNESIUM: CPT | Performed by: HOSPITALIST

## 2018-03-10 PROCEDURE — 94003 VENT MGMT INPAT SUBQ DAY: CPT

## 2018-03-10 PROCEDURE — 25000128 H RX IP 250 OP 636: Performed by: INTERNAL MEDICINE

## 2018-03-10 PROCEDURE — 25000125 ZZHC RX 250: Performed by: HOSPITALIST

## 2018-03-10 PROCEDURE — 85520 HEPARIN ASSAY: CPT | Performed by: INTERNAL MEDICINE

## 2018-03-10 PROCEDURE — 25000128 H RX IP 250 OP 636: Performed by: PHYSICIAN ASSISTANT

## 2018-03-10 PROCEDURE — 40000239 ZZH STATISTIC VAT ROUNDS

## 2018-03-10 PROCEDURE — 40000008 ZZH STATISTIC AIRWAY CARE

## 2018-03-10 PROCEDURE — 25000128 H RX IP 250 OP 636: Performed by: HOSPITALIST

## 2018-03-10 PROCEDURE — 00000146 ZZHCL STATISTIC GLUCOSE BY METER IP

## 2018-03-10 PROCEDURE — 85027 COMPLETE CBC AUTOMATED: CPT | Performed by: HOSPITALIST

## 2018-03-10 PROCEDURE — 80048 BASIC METABOLIC PNL TOTAL CA: CPT | Performed by: INTERNAL MEDICINE

## 2018-03-10 PROCEDURE — 25000128 H RX IP 250 OP 636

## 2018-03-10 RX ADMIN — PIPERACILLIN SODIUM,TAZOBACTAM SODIUM 4.5 G: 4; .5 INJECTION, POWDER, FOR SOLUTION INTRAVENOUS at 21:45

## 2018-03-10 RX ADMIN — CHLORHEXIDINE GLUCONATE 15 ML: 1.2 RINSE ORAL at 19:45

## 2018-03-10 RX ADMIN — PIPERACILLIN SODIUM,TAZOBACTAM SODIUM 4.5 G: 4; .5 INJECTION, POWDER, FOR SOLUTION INTRAVENOUS at 03:45

## 2018-03-10 RX ADMIN — PROPOFOL 25 MCG/KG/MIN: 10 INJECTION, EMULSION INTRAVENOUS at 01:07

## 2018-03-10 RX ADMIN — ASCORBIC ACID, VITAMIN A PALMITATE, CHOLECALCIFEROL, THIAMINE HYDROCHLORIDE, RIBOFLAVIN-5 PHOSPHATE SODIUM, PYRIDOXINE HYDROCHLORIDE, NIACINAMIDE, DEXPANTHENOL, ALPHA-TOCOPHEROL ACETATE, VITAMIN K1, FOLIC ACID, BIOTIN, CYANOCOBALAMIN: 200; 3300; 200; 6; 3.6; 6; 40; 15; 10; 150; 600; 60; 5 INJECTION, SOLUTION INTRAVENOUS at 06:52

## 2018-03-10 RX ADMIN — Medication 0.25 MG/MIN: at 06:58

## 2018-03-10 RX ADMIN — PROPOFOL 25 MCG/KG/MIN: 10 INJECTION, EMULSION INTRAVENOUS at 20:01

## 2018-03-10 RX ADMIN — PIPERACILLIN SODIUM,TAZOBACTAM SODIUM 4.5 G: 4; .5 INJECTION, POWDER, FOR SOLUTION INTRAVENOUS at 10:28

## 2018-03-10 RX ADMIN — CHLORHEXIDINE GLUCONATE 15 ML: 1.2 RINSE ORAL at 08:06

## 2018-03-10 RX ADMIN — FAMOTIDINE 20 MG: 10 INJECTION, SOLUTION INTRAVENOUS at 06:12

## 2018-03-10 RX ADMIN — I.V. FAT EMULSION 250 ML: 20 EMULSION INTRAVENOUS at 08:06

## 2018-03-10 RX ADMIN — HEPARIN SODIUM 1600 UNITS/HR: 10000 INJECTION, SOLUTION INTRAVENOUS at 10:31

## 2018-03-10 RX ADMIN — PROPOFOL 5 MCG/KG/MIN: 10 INJECTION, EMULSION INTRAVENOUS at 08:05

## 2018-03-10 RX ADMIN — PIPERACILLIN SODIUM,TAZOBACTAM SODIUM 4.5 G: 4; .5 INJECTION, POWDER, FOR SOLUTION INTRAVENOUS at 16:06

## 2018-03-10 NOTE — PROGRESS NOTES
[facility] ICU RESPIRATORY NOTE  Date of Admission:  2/27/18  Date of Intubation (most recent):  3/7/18  Reason for Mechanical Ventilation:  Resp failure  Number of Days on Mechanical Ventilation:  3  Met Criteria for Pressure Support Trial:  Yes  Length of Pressure Support Trial:  10/5 for 4 hours  Reason for Stopping Pressure Support Trial:  Per MD  Reason for No Pressure Support Trial:      Significant Events Today:  None   Pt remains on the following vent settings:     Ventilation Mode: CMV/AC  (Continuous Mandatory Ventilation/ Assist Control)  FiO2 (%): 40 %  Rate Set (breaths/minute): 16 breaths/min  Tidal Volume Set (mL): 450 mL  PEEP (cm H2O): 5 cmH2O  Pressure Support (cm H2O): 10 cmH2O  Oxygen Concentration (%): 40 %    3/10/2018  Lauren Monet RRT

## 2018-03-10 NOTE — PLAN OF CARE
Problem: Patient Care Overview  Goal: Plan of Care/Patient Progress Review  Outcome: Improving  Pt continues on heparin, propofol, TPN and amio gtts. Tolerating the vent. Good urine output. Very fragile skin.

## 2018-03-10 NOTE — PLAN OF CARE
Problem: Patient Care Overview  Goal: Plan of Care/Patient Progress Review  Outcome: No Change  End of Shift Nursing Summary  03/09/18 1500 to 1930   Neuro:  Sedated. Does not open eyes to name/voice. LUTHER spontaneously. Not following commands.   Pain: CPOT score 0  CV:  SR with BBB  Pulm: Full vent support. Thin white secretions through endotracheal suction.  GI/: Urine output 50-60/hr  Endocrine: BG checks and SSI as needed.  Skin: Fragile. Bruised. Multiple skin tears. Bottom with blanchable erythema and blister/open area. Sacral mepilex applied.  Lines/drips: Triple lumen PICC and 2 PIV.     *See EPIC flowsheet for full nursing assessment findings    Tatum Coley

## 2018-03-10 NOTE — PROGRESS NOTES
Surgery    Pt remains vented.   Up in chair today  NG with 250 out yesterday, will continue  Await return of bowel function    B/P: 146/77, T: 97.6, P: 145[up to 168[, R: 25    Abd: soft. Incisions - steri strips in place - clean/dry/intact    A/P: s/p exploratory laparoscopy with TISHA and lap assisted small bowel resection, POD #6  - continue NG and TPN  - await return of bowel function  - ICU care for respiratory support    Dina Bai PA-C

## 2018-03-10 NOTE — PROGRESS NOTES
[facility]    Critical Care Service  Progress Note    Date of Service (when I saw the patient): 03/10/2018     Problem List   SBO s/p ex lap, TISHA and partial small bowel resection 3/4  A-fib with RVR  Acute hypercarbic respiratory failure s/p intubation   ?Aspiration PNA  Protein calorie malnutrition, on TPN    Main Plans for Today   Daily PST  Continue TPN  End date for zosyn     Assessment & Plan   Joseluis Falcon is a 85 male initially admitted for SBO on 2/27, initially managed non-operatively now s/p lysis of adhesions and partial small bowel resection on 3/4. Decompensated with encephalopathy and respiratory failure night of 3/6, intubated and transferred to ICU.    Neurology/Psychiatry:   1. Encephalopathy/agitation - related to hypoxemia -? Evolving sepsis +/- delirium   2. Analgesia/Anxiety  Plan  - propofol gtt , goal RASS  0   - prn dilaudid    - continue olanzapine       Cardiovascular:   1.Hemodynamics -currently appropriate   2.Rhythm - SR, previous atrial fibrillation with RVR  Plan  - follow hemodynamics, goal MAP >65, no pressors   - now back in sinus rhythm.  Amio drip 0.25/min.  This is dose equivalent to 400 PO per day.  Will maintain here.  Appreciate EP input.  - continue metoprolol - though defer to EP   - continue heparin gtt for afib      Pulmonary/Ventilator Management:   1. Acute hypercarbic respiratory failure - in setting of recent surgery, likely aspiration with evidence of REJI physiology on initial eval - oxygenation/mechanics satisfactory on vent  2. Aspiration pneumonia  3. Mild emphysema/fibrosis - ? Age related   4.  Respiratory alkalosis - improved with vent changes  Plan  - continue vent.  Attempted SBT (12/5) today but rapid/shallow breathing halted trial within 5 minutes. Will try again this afternoon  - Does follow cmds when sedation weaned.  - pulmonary toilet       GI and Nutrition :   1. Small bowel obstruction s/p ex lap, TISHA and partial small bowel resection  3/4 - with subsequent imaging suggestive of continued obstruction  2. Severe protein calorie malnutrition  3. GERD/Carlson's   Plan  - NG tube suction, NG output trending down   - H2 blocker   - TPN, continue for now per surgery   - surgery following   - no return of bowel function yet       Renal/Fluids/Electrolytes:   1. ?Mild BJ, slight elevation in creatinine --- resolved  2. Electrolyte abnormality - hypernatremia resolved  3. Acid/base status - resp alkalosis-improved  4. Volume status - Goal even.  Plan  - maintain hemodynamics   - monitor function and electrolytes as needed with replacement per ICU protocols.  - generally avoid nephrotoxic agents such as NSAID, IV contrast unless specifically required  - adjust medications as needed for renal clearance  - follow I/O's as appropriate      Infectious Disease:   1. ?Aspiration pneumonia/sepsis  Plan  - Sputum NGTD, procalcitonin negative    - no WBC and afebrile   - continue zosyn (5 days), continue for 7 day course       Endocrine:   1. Stress induced hyperglycemia  Plan  - ICU insulin protocol, goal sugar <180  - SSI      Hematology/Oncology:   1. No active issue, empirically anticoagulated   2. Thrombocytopenia   Plan  - serial cbc, coags as indicated  - platelets 139, stable        IV/Access:   1. Venous access - PICC   2. Arterial access - none  Plan  - central access required and necessary      ICU Prophylaxis:   1. DVT: Hep gtt   2. VAP: HOB 30 degrees, chlorhexidine rinse  3. Stress Ulcer: H2 blocker  4. Restraints: Nonviolent soft two point restraints required and necessary for patient safety and continued cares and good effect as patient continues to pull at necessary lines, tubes despite education and distraction. Will readdress daily.   5. Wound care - per unit routine   6. Feeding - TPN   7. Family Update: pending   8. Disposition - ICU    Tierra Ling    Time Spent on this Encounter   Billing:  I spent 45 minutes bedside and on the inpatient  unit today managing the critical care of Joseluis Falcon in relation to the issues listed in this note.    Interval History   Course reviewed. No acute events overnight. Patient remains on TPN and the ventilator.     Physical Exam   Temp: 97.6  F (36.4  C) Temp src: Axillary Temp  Min: 97.4  F (36.3  C)  Max: 98.2  F (36.8  C) BP: 146/77   Heart Rate: 56 Resp: 25 SpO2: 100 % O2 Device: Mechanical Ventilator    Vitals:    03/08/18 0200 03/09/18 0451 03/10/18 0200   Weight: 63.9 kg (140 lb 14 oz) 65.9 kg (145 lb 4.5 oz) 63.5 kg (139 lb 15.9 oz)     I/O last 3 completed shifts:  In: 3455.29 [I.V.:1295.29]  Out: 3975 [Urine:3725; Emesis/NG output:250]    GEN:  male, resting in chair   EYES: PERRL, Anicteric sclera.   HEENT:  Normocephalic, atraumatic, trachea midline, ETT secure, small wound over left upper lip   CV: RRR, no murmurs  PULM/CHEST: Clear breath sounds bilaterally without rhonchi, no wheeze  GI: hypoactive bowel sounds, soft, non-tender; two laparoscopic incisions covered   : alba catheter in place, urine yellow and clear  EXTREMITIES: no peripheral edema, moving all extremities  NEURO: opens eyes to voice, follows commands with upper and lower extremities   SKIN: warm and dry   ECG- NSR    Medications     parenteral nutrition - Clinimix E 90 mL/hr at 03/10/18 0728     propofol (DIPRIVAN) infusion 5 mcg/kg/min (03/10/18 0805)     IV fluid REPLACEMENT ONLY       amiodarone 0.25 mg/min (03/10/18 0727)     HEParin 1,600 Units/hr (03/10/18 0802)     IV fluid REPLACEMENT ONLY         piperacillin-tazobactam  4.5 g Intravenous Q6H     insulin aspart  1-6 Units Subcutaneous Q4H     chlorhexidine  15 mL Mouth/Throat Q12H     famotidine  20 mg Intravenous Q24H     sodium chloride 0.9%  500 mL Intravenous Once     metoprolol  5 mg Intravenous Once     sodium chloride (PF)  3 mL Intracatheter Q8H     sodium chloride (PF)  10 mL Intracatheter Q8H     amiodarone  150 mg Intravenous Once     lipids  250 mL  Intravenous Every Other Day     QUEtiapine  12.5 mg Oral At Bedtime       Data     Recent Labs  Lab 03/10/18  0506 03/09/18  1700 03/09/18  0429 03/08/18  0445 03/07/18  0515 03/06/18  0857   WBC 8.8  --  7.9 7.7 7.7  --    HGB 10.2*  --  10.6* 10.9* 12.4*  --    MCV 99  --  100 101* 105*  --    *  --  122* 126* 131*  --    INR  --   --   --   --  1.23*  --     141 142 142 149*  --    POTASSIUM 4.0 4.0 3.7 3.8 4.1  --    CHLORIDE 109 109 109 109 115*  --    CO2 29 28 28 28 32  --    BUN 28 27 26 28 27  --    CR 1.03 1.10 1.04 1.18 1.25  --    ANIONGAP 4 4 5 5 2*  --    TOBI 7.3* 7.5* 7.2* 7.3* 7.5*  --    * 119* 133* 131* 156*  --    ALBUMIN  --   --   --   --  2.1*  --    PROTTOTAL  --   --   --   --  5.3*  --    BILITOTAL  --   --   --   --  1.0  --    ALKPHOS  --   --   --   --  51  --    ALT  --   --   --   --  12  --    AST  --   --   --   --  18  --    TROPI  --   --   --   --  0.024 <0.015     No results found for this or any previous visit (from the past 24 hour(s)).

## 2018-03-10 NOTE — PLAN OF CARE
Problem: Patient Care Overview  Goal: Plan of Care/Patient Progress Review  Outcome: No Change  Neuro: Able to squeeze hands, wiggle toes. PERRL. Purposeful- goes to ETT when unrestrained. Sedated on propofol gtt.    Cardiac: SB-SR. On amio gtt and heparin gtt. Pulses palpable.   Resp: Full vent support. Minimal secretions from ETT and oral.   GI: BS hypoactive. Blood sugar WNL. NG in place- 50cc brown, bile fluid out.   : Segal in place, good UOP.

## 2018-03-11 ENCOUNTER — APPOINTMENT (OUTPATIENT)
Dept: GENERAL RADIOLOGY | Facility: CLINIC | Age: 83
DRG: 329 | End: 2018-03-11
Attending: INTERNAL MEDICINE
Payer: COMMERCIAL

## 2018-03-11 LAB
ANION GAP SERPL CALCULATED.3IONS-SCNC: 5 MMOL/L (ref 3–14)
BUN SERPL-MCNC: 27 MG/DL (ref 7–30)
CALCIUM SERPL-MCNC: 7.6 MG/DL (ref 8.5–10.1)
CHLORIDE SERPL-SCNC: 108 MMOL/L (ref 94–109)
CO2 SERPL-SCNC: 27 MMOL/L (ref 20–32)
CREAT SERPL-MCNC: 0.97 MG/DL (ref 0.66–1.25)
ERYTHROCYTE [DISTWIDTH] IN BLOOD BY AUTOMATED COUNT: 13 % (ref 10–15)
GFR SERPL CREATININE-BSD FRML MDRD: 73 ML/MIN/1.7M2
GLUCOSE BLDC GLUCOMTR-MCNC: 109 MG/DL (ref 70–99)
GLUCOSE BLDC GLUCOMTR-MCNC: 117 MG/DL (ref 70–99)
GLUCOSE BLDC GLUCOMTR-MCNC: 118 MG/DL (ref 70–99)
GLUCOSE BLDC GLUCOMTR-MCNC: 127 MG/DL (ref 70–99)
GLUCOSE BLDC GLUCOMTR-MCNC: 90 MG/DL (ref 70–99)
GLUCOSE SERPL-MCNC: 114 MG/DL (ref 70–99)
HCT VFR BLD AUTO: 30.6 % (ref 40–53)
HGB BLD-MCNC: 9.9 G/DL (ref 13.3–17.7)
LMWH PPP CHRO-ACNC: 0.22 IU/ML
LMWH PPP CHRO-ACNC: 0.37 IU/ML
MAGNESIUM SERPL-MCNC: 2.2 MG/DL (ref 1.6–2.3)
MCH RBC QN AUTO: 32 PG (ref 26.5–33)
MCHC RBC AUTO-ENTMCNC: 32.4 G/DL (ref 31.5–36.5)
MCV RBC AUTO: 99 FL (ref 78–100)
PLATELET # BLD AUTO: 149 10E9/L (ref 150–450)
POTASSIUM SERPL-SCNC: 4.2 MMOL/L (ref 3.4–5.3)
RBC # BLD AUTO: 3.09 10E12/L (ref 4.4–5.9)
SODIUM SERPL-SCNC: 140 MMOL/L (ref 133–144)
WBC # BLD AUTO: 12.5 10E9/L (ref 4–11)

## 2018-03-11 PROCEDURE — 80048 BASIC METABOLIC PNL TOTAL CA: CPT | Performed by: HOSPITALIST

## 2018-03-11 PROCEDURE — 25000128 H RX IP 250 OP 636: Performed by: PHYSICIAN ASSISTANT

## 2018-03-11 PROCEDURE — 25000128 H RX IP 250 OP 636: Performed by: INTERNAL MEDICINE

## 2018-03-11 PROCEDURE — 94003 VENT MGMT INPAT SUBQ DAY: CPT

## 2018-03-11 PROCEDURE — 85520 HEPARIN ASSAY: CPT | Performed by: HOSPITALIST

## 2018-03-11 PROCEDURE — 25000128 H RX IP 250 OP 636: Performed by: HOSPITALIST

## 2018-03-11 PROCEDURE — 00000146 ZZHCL STATISTIC GLUCOSE BY METER IP

## 2018-03-11 PROCEDURE — 40000239 ZZH STATISTIC VAT ROUNDS

## 2018-03-11 PROCEDURE — 40000008 ZZH STATISTIC AIRWAY CARE

## 2018-03-11 PROCEDURE — 25000125 ZZHC RX 250

## 2018-03-11 PROCEDURE — 25000125 ZZHC RX 250: Performed by: HOSPITALIST

## 2018-03-11 PROCEDURE — 83735 ASSAY OF MAGNESIUM: CPT | Performed by: HOSPITALIST

## 2018-03-11 PROCEDURE — 85027 COMPLETE CBC AUTOMATED: CPT | Performed by: HOSPITALIST

## 2018-03-11 PROCEDURE — 25000132 ZZH RX MED GY IP 250 OP 250 PS 637: Performed by: INTERNAL MEDICINE

## 2018-03-11 PROCEDURE — 40000275 ZZH STATISTIC RCP TIME EA 10 MIN

## 2018-03-11 PROCEDURE — 85520 HEPARIN ASSAY: CPT | Performed by: INTERNAL MEDICINE

## 2018-03-11 PROCEDURE — 25000128 H RX IP 250 OP 636

## 2018-03-11 PROCEDURE — 20000003 ZZH R&B ICU

## 2018-03-11 PROCEDURE — 71045 X-RAY EXAM CHEST 1 VIEW: CPT

## 2018-03-11 RX ORDER — PIPERACILLIN SODIUM, TAZOBACTAM SODIUM 4; .5 G/20ML; G/20ML
4.5 INJECTION, POWDER, LYOPHILIZED, FOR SOLUTION INTRAVENOUS EVERY 6 HOURS
Status: DISCONTINUED | OUTPATIENT
Start: 2018-03-11 | End: 2018-03-12

## 2018-03-11 RX ORDER — HALOPERIDOL 5 MG/ML
2 INJECTION INTRAMUSCULAR EVERY 6 HOURS PRN
Status: DISCONTINUED | OUTPATIENT
Start: 2018-03-11 | End: 2018-03-19

## 2018-03-11 RX ADMIN — CHLORHEXIDINE GLUCONATE 15 ML: 1.2 RINSE ORAL at 19:54

## 2018-03-11 RX ADMIN — FAMOTIDINE 20 MG: 10 INJECTION, SOLUTION INTRAVENOUS at 20:06

## 2018-03-11 RX ADMIN — PROPOFOL 25 MCG/KG/MIN: 10 INJECTION, EMULSION INTRAVENOUS at 04:19

## 2018-03-11 RX ADMIN — CHLORHEXIDINE GLUCONATE 15 ML: 1.2 RINSE ORAL at 09:24

## 2018-03-11 RX ADMIN — PIPERACILLIN SODIUM,TAZOBACTAM SODIUM 4.5 G: 4; .5 INJECTION, POWDER, FOR SOLUTION INTRAVENOUS at 09:22

## 2018-03-11 RX ADMIN — PIPERACILLIN SODIUM,TAZOBACTAM SODIUM 4.5 G: 4; .5 INJECTION, POWDER, FOR SOLUTION INTRAVENOUS at 15:40

## 2018-03-11 RX ADMIN — PIPERACILLIN SODIUM,TAZOBACTAM SODIUM 4.5 G: 4; .5 INJECTION, POWDER, FOR SOLUTION INTRAVENOUS at 21:26

## 2018-03-11 RX ADMIN — PIPERACILLIN SODIUM,TAZOBACTAM SODIUM 4.5 G: 4; .5 INJECTION, POWDER, FOR SOLUTION INTRAVENOUS at 04:18

## 2018-03-11 RX ADMIN — HYDROMORPHONE HYDROCHLORIDE 0.2 MG: 1 INJECTION, SOLUTION INTRAMUSCULAR; INTRAVENOUS; SUBCUTANEOUS at 21:41

## 2018-03-11 RX ADMIN — HEPARIN SODIUM 1450 UNITS/HR: 10000 INJECTION, SOLUTION INTRAVENOUS at 05:16

## 2018-03-11 RX ADMIN — HEPARIN SODIUM 1350 UNITS/HR: 10000 INJECTION, SOLUTION INTRAVENOUS at 21:27

## 2018-03-11 RX ADMIN — Medication 12.5 MG: at 21:41

## 2018-03-11 RX ADMIN — HALOPERIDOL LACTATE 2 MG: 5 INJECTION, SOLUTION INTRAMUSCULAR at 22:55

## 2018-03-11 RX ADMIN — Medication 0.25 MG/MIN: at 00:43

## 2018-03-11 RX ADMIN — Medication 0.25 MG/MIN: at 19:55

## 2018-03-11 RX ADMIN — ASCORBIC ACID, VITAMIN A PALMITATE, CHOLECALCIFEROL, THIAMINE HYDROCHLORIDE, RIBOFLAVIN-5 PHOSPHATE SODIUM, PYRIDOXINE HYDROCHLORIDE, NIACINAMIDE, DEXPANTHENOL, ALPHA-TOCOPHEROL ACETATE, VITAMIN K1, FOLIC ACID, BIOTIN, CYANOCOBALAMIN: 200; 3300; 200; 6; 3.6; 6; 40; 15; 10; 150; 600; 60; 5 INJECTION, SOLUTION INTRAVENOUS at 04:19

## 2018-03-11 RX ADMIN — FAMOTIDINE 20 MG: 10 INJECTION, SOLUTION INTRAVENOUS at 06:55

## 2018-03-11 ASSESSMENT — PAIN DESCRIPTION - DESCRIPTORS: DESCRIPTORS: ACHING

## 2018-03-11 NOTE — PROGRESS NOTES
Daily ICU Progress Note         Admission Date:  2/27/2018    Active Problem List:   -->  SBO s/p surgical intervention, lysis of adhesion, partial small bowel resection (03/04)  -->  A fib with RVR  -->  Aspiration PNA  -->  Anticoagulated  -->  Pulmonary fibrosis    24 Hour Events: No acute events overnight    Subjective: In brief, this is an 85-year-old male who initially presented on 2/27/2018 with abdominal pain and vomiting.  Imaging demonstrated a proximal small bowel obstruction.  Initial conservative management.  Despite conservative therapy he had worsening nausea and had some increased pain and on 3/4/2018 decision to move toward a diagnostic laparoscopy.  On 3/4/2018 had an exploratory laparoscopy, laparoscopic lysis of adhesions, laparoscopic assisted small bowel resection.  Minimal blood loss.  Has some complications with delirium.  Clinically worsened on 3/6/2018 with tachycardia demonstrating A. fib with RVR.  Worsening tachypnea.  CT scan performed to evaluate for PE which did not demonstrate a PE but did demonstrate bilateral hazy opacities as well as increased interstitial opacities with some honeycombing consistent with underlying pulmonary fibrosis.  Antibiotics started for possible pneumonia.  EP was consulted for the atrial fibrillation.  Echocardiogram demonstrated an EF of 65-70% with some diastolic dysfunction, moderate mitral regurgitation, mild mitral valve prolapse.  Patient was started on TPN.  Patient transferred to the ICU 1 3/7/2018 with a working diagnosis of small bowel obstruction aspiration pneumonia.      ROS:  Unable to obtain 2/2 critical illness    Pertinent Inpatient Medications:   Current Facility-Administered Medications   Medication     piperacillin-tazobactam (ZOSYN) 4.5 g vial to attach to  mL bag     insulin aspart (NovoLOG) inj (RAPID ACTING)     parenteral nutrition - Clinimix E     propofol (DIPRIVAN) infusion     famotidine (PEPCID) injection 20 mg      "amiodarone in D5W adult drip (NEXTERONE) 250 MG/250ML IV infusion     heparin infusion 25,000 units in 0.45% NaCl 250 mL     lipids (INTRALIPID) 20 % infusion 250 mL     dextrose 10 % 1,000 mL infusion     artificial saliva (BIOTENE MT) spray 1 spray     magnesium sulfate 4 g in 100 mL sterile water (premade)     HYDROmorphone (PF) (DILAUDID) injection 0.2 mg     acetaminophen (OFIRMEV) infusion 1,000 mg     QUEtiapine (SEROquel) half-tab 12.5 mg     acetaminophen (TYLENOL) tablet 650 mg     ondansetron (ZOFRAN) injection 4 mg     prochlorperazine (COMPAZINE) injection 5 mg     Objective:     /52  Pulse 145  Temp 98.3  F (36.8  C) (Axillary)  Resp (!) 31  Ht 1.753 m (5' 9\")  Wt 65.9 kg (145 lb 4.5 oz)  SpO2 100%  BMI 21.45 kg/m2    Intake/Output Summary (Last 24 hours) at 18 0738  Last data filed at 18 0700   Gross per 24 hour   Intake          2873.83 ml   Output             2650 ml   Net           223.83 ml     Ventilation Mode: CMV/AC  (Continuous Mandatory Ventilation/ Assist Control)  FiO2 (%): 40 %  Rate Set (breaths/minute): 16 breaths/min  Tidal Volume Set (mL): 450 mL  PEEP (cm H2O): 5 cmH2O  Pressure Support (cm H2O): 12 cmH2O  Oxygen Concentration (%): 40 %  Resp: 31    General: Adult male, no distress, sedated, intubated  HEENT: NCAT; EOMI; no icterus, lip ulcer  Pulm: Faint bilateral crackles ,no wheezes  CV: Irreg, irreg, no murmur  Abdomen: Soft, no bowel sounds, surgical sites dry and clean  : alba  Extremities: No edema, no clubbing  Skin: Warm to touch  Neuro: Sedated, no focal neurological deficit noted    Pertinent Labs: All labs reviewed.  Most pertinent labs noted below    Basic metabolic panel: 140/4.2/108/27/27/0.97  CBC: 12.5/9.9/149  G - 142    Microbiology / Cultures:     No positive cultures    Imaging:  Imaging was reviewed by me personally.  Pertinent positives noted below.     CXR - pending    Assessment and Plan:   This is an 85-year-old male " admitted with a small bowel obstruction now status post laparoscopic adhesion lysis and a partial small bowel resection who had worsening delirium and suspected aspiration pneumonia with acute hypoxemic respiratory failure now doing a pressure support trial to assess ability to liberate from the ventilator.    Neuro:   1.  Delirium -likely multifactorial given his age, acute illness, worsening respiratory failure.  Will minimize benzodiazepines and narcotics.  Lights on during the day.  Lights off at night.  Continue Seroquel low dose    2.  Sedation continue with low-dose propofol    Pulm:   1.  Acute hypoxemic respiratory failure -likely secondary to suspected aspiration pneumonia.  On antibiotics.  No positive cultures to date.  Repeat chest x-ray today.  Pressure support trial with hopes of extubation within the next 24 hours.  Predicted body weight approximately 70 kg.  Increase tidal volume to 550 approximately 8 cc/kg  --> pst  --> sputum culture if able  --> cxr  --> increase Vt on ventilator    CV:   1.  Atrial fibrillation with RVR.  EP signed off.  Continue with anticoagulation with heparin drip.  Currently on day 3 of 0.25 mg/min.  Tomorrow sit transition to 0.125 mg/min. can then change to 200 mg daily once able to take p.o.  --> see amiodarone gtt above  --> cont anticoagulation    Renal:   1.  Acute kidney injury - resolved.     2.  Hypernatremia -resolved    ID:  1.  PNA - cont zosyn. WBC worsening. Try and obtain sputum sample today.   --> sputum  --> cont abx    GI:   1.  SBO - s/p surgery. Cont NG tube to suction. Monitor for return of bowel fxn.     2.  GERD / Carlson's esophagus - cont H2 blocker    Heme / Onc:   1.  Thrombocytopenia - improved    Endocrine:   1.  Glc - controlled  --> cont insulin sliding scale    Musculoskeletal:   1.  Weakness - ptot    Nutrition:   1.  TPN    Prophylaxis:  DVT Prophylaxis:  On heparin gtt  GI Prophylaxis: H2 blocker  Ventilator / PNA Prophylaxis:  Hob  elevation, oral cares, icu protocol  PTOT:  ordered  Restraints: UE restraints needed    Disposition: MICU    Critical Care Time:  40 min. This excludes any time for procedures performed. Critical Care billing time therefore does not include the time spent during procedure and is independent of these charges.     George Merritt MD  Pulmonary and Critical Care  [facility]  Pager:  [phone]

## 2018-03-11 NOTE — PROGRESS NOTES
[facility] ICU RESPIRATORY NOTE  Date of Admission:  2/27/18  Date of Intubation (most recent):  3/7/18  Reason for Mechanical Ventilation:  Resp failure  Number of Days on Mechanical Ventilation:  4  Met Criteria for Pressure Support Trial:  Yes  Length of Pressure Support Trial:    Reason for Stopping Pressure Support Trial:    Reason for No Pressure Support Trial: Rest overnight      Significant Events Today:  None this shift      Recent Labs  Lab 03/07/18 2012 03/07/18  1118 03/07/18  0800 03/06/18  1006   PH 7.49* 7.50* Canceled, Test credited 7.46*   PCO2 39 37 Canceled, Test credited 44   PO2 149* 172* Canceled, Test credited 83   HCO3 29* 29* Canceled, Test credited 32*   O2PER  --  50% Canceled, Test credited  --      Ventilation Mode: CMV/AC  (Continuous Mandatory Ventilation/ Assist Control)  FiO2 (%): 40 %  Rate Set (breaths/minute): 16 breaths/min  Tidal Volume Set (mL): 450 mL  PEEP (cm H2O): 5 cmH2O  Pressure Support (cm H2O): 12 cmH2O  Oxygen Concentration (%): 40 %  Resp: 29    Will continue to follow.     James Love RT

## 2018-03-11 NOTE — PROGRESS NOTES
Pt was extubated @ 13:55 to 50% cool aerosol 10LPM. SpO2 100%. No stridor. Will continue to follow.  3/11/2018  Josy Pizano

## 2018-03-11 NOTE — PROGRESS NOTES
Pt is currently on full ventilatory support. On the assessment open his eyes, and follow commands, thus set on P/S 12/5, 40%, tolerated well for two hours, and increased WOB;, set him back to CMV. BBS rhonchi gets clears post suction with white/ tan section. Normal vital. Will assess for weaning readiness.3/10/2018  .Jim May

## 2018-03-11 NOTE — PLAN OF CARE
Problem: Patient Care Overview  Goal: Plan of Care/Patient Progress Review  Outcome: Improving  Pt PS for approx 2hr. Up in chair for 4hr, began getting restless and was transferred back to bed. Follows commands, nods head, gives thumbs up/down. Great UOP in alba. Abdomen soft, non distended. 100cc from NG. Plan to PS again tomorrow. Daughter and wife updated over the phone.

## 2018-03-11 NOTE — PLAN OF CARE
Problem: Patient Care Overview  Goal: Plan of Care/Patient Progress Review  OT/PT: Talked to nursing today in rounds, pt is not appropriate for therapies today.  Will check back as able.

## 2018-03-11 NOTE — PROGRESS NOTES
Shift summary nursing note  Assumed care from [id number] on 03/10/2018.  Neuro: sedated with propofol. Perrla. Withdraws to pain stimuli. Gets restless with stimuli. Restraints BUE  CV: sinus mitchel with heart rate in 50-60's. Blood pressure stable with MAP >65.  Pulses palpable. On amio drip for arrhythmias.  Pulmo: intubated CMV; FiO2 40%, , Rate 16 and PEEP 5. Diminished lung sounds. Small secretions for ETT.   GI/: hypoactive BS. Segal in place with >200 Q 2 hrs UOP. NGT (LIWS) 59@ nares with 150 cc bile green output.  Endocrine: blood sugar checks Q 4 hrs on SSI. Within no normal no coverage needed in my shift.  Skin: frail and fragile skin with  multiple bruises on BUE. Sore/scabbing/cracked  Lips and Vaseline applied. coccyx with mepilex UTV.   Lines/drip. 3 L right PICC and PIV'S. Hep/amio/propofol/clinimix drip running.   plan continue with SBT and possible extubation if tolerates SBT. Will continue to monitor.

## 2018-03-11 NOTE — PROGRESS NOTES
"General Surgery  Admission Date: 2/27/2018  Today's Date: 3/11/2018      SUBJECTIVE  Patient remains vented, sedation being turned off. NG output decreasing, 2425 yesterday and 275cc so far today. Pressure support trial yesterday, hopeful for extubation today. Per patient's family, patient has baseline swallowing issues so will need speech consult prior to any PO intake.        OBJECTIVE  /55  Pulse 145  Temp 98.4  F (36.9  C) (Axillary)  Resp 25  Ht 1.753 m (5' 9\")  Wt 65.9 kg (145 lb 4.5 oz)  SpO2 100%  BMI 21.45 kg/m2  I/O last 3 completed shifts:  In: 2870.23 [I.V.:890.23]  Out: 2650 [Urine:2425; Emesis/NG output:225]  General: sedated, intubated, resting comfortably in bed  Head: NG tube in place with thin green output in canister  Abdomen: soft, non-distended, no bowel sounds appreciated  Incisions: clean, dry and intact with steris in place          ASSESSMENT/PLAN  Joseluis Falcon is a 85 year old male s/p exploratory laparoscopy with lap TISHA and lap-assisted small bowel resection POD 7  - Continue ICU cares, hopeful for extubation soon  - Continue NG tube and TPN  - Await return of bowel function  - Likely speech consult once bowel function returns and ready for PO intake            Silvia Nelson PA-C  Office: [phone]  Pager: [phone]   "

## 2018-03-12 ENCOUNTER — ANESTHESIA (OUTPATIENT)
Dept: INTENSIVE CARE | Facility: CLINIC | Age: 83
DRG: 329 | End: 2018-03-12
Payer: COMMERCIAL

## 2018-03-12 ENCOUNTER — APPOINTMENT (OUTPATIENT)
Dept: GENERAL RADIOLOGY | Facility: CLINIC | Age: 83
DRG: 329 | End: 2018-03-12
Attending: INTERNAL MEDICINE
Payer: COMMERCIAL

## 2018-03-12 ENCOUNTER — APPOINTMENT (OUTPATIENT)
Dept: GENERAL RADIOLOGY | Facility: CLINIC | Age: 83
DRG: 329 | End: 2018-03-12
Attending: SURGERY
Payer: COMMERCIAL

## 2018-03-12 ENCOUNTER — ANESTHESIA EVENT (OUTPATIENT)
Dept: INTENSIVE CARE | Facility: CLINIC | Age: 83
DRG: 329 | End: 2018-03-12
Payer: COMMERCIAL

## 2018-03-12 LAB
ALBUMIN SERPL-MCNC: 1.8 G/DL (ref 3.4–5)
ALP SERPL-CCNC: 61 U/L (ref 40–150)
ALT SERPL W P-5'-P-CCNC: 22 U/L (ref 0–70)
ANION GAP SERPL CALCULATED.3IONS-SCNC: 4 MMOL/L (ref 3–14)
AST SERPL W P-5'-P-CCNC: 28 U/L (ref 0–45)
BASE EXCESS BLDV CALC-SCNC: 2.4 MMOL/L
BASOPHILS # BLD AUTO: 0 10E9/L (ref 0–0.2)
BASOPHILS NFR BLD AUTO: 0.3 %
BILIRUB SERPL-MCNC: 0.7 MG/DL (ref 0.2–1.3)
BUN SERPL-MCNC: 26 MG/DL (ref 7–30)
CALCIUM SERPL-MCNC: 7.8 MG/DL (ref 8.5–10.1)
CHLORIDE SERPL-SCNC: 113 MMOL/L (ref 94–109)
CO2 SERPL-SCNC: 27 MMOL/L (ref 20–32)
CREAT SERPL-MCNC: 0.92 MG/DL (ref 0.66–1.25)
DIFFERENTIAL METHOD BLD: ABNORMAL
EOSINOPHIL # BLD AUTO: 0.2 10E9/L (ref 0–0.7)
EOSINOPHIL NFR BLD AUTO: 2.1 %
ERYTHROCYTE [DISTWIDTH] IN BLOOD BY AUTOMATED COUNT: 12.9 % (ref 10–15)
GFR SERPL CREATININE-BSD FRML MDRD: 78 ML/MIN/1.7M2
GLUCOSE BLDC GLUCOMTR-MCNC: 103 MG/DL (ref 70–99)
GLUCOSE BLDC GLUCOMTR-MCNC: 109 MG/DL (ref 70–99)
GLUCOSE BLDC GLUCOMTR-MCNC: 112 MG/DL (ref 70–99)
GLUCOSE BLDC GLUCOMTR-MCNC: 124 MG/DL (ref 70–99)
GLUCOSE BLDC GLUCOMTR-MCNC: 128 MG/DL (ref 70–99)
GLUCOSE SERPL-MCNC: 110 MG/DL (ref 70–99)
HCO3 BLDV-SCNC: 29 MMOL/L (ref 21–28)
HCT VFR BLD AUTO: 33.4 % (ref 40–53)
HGB BLD-MCNC: 10.8 G/DL (ref 13.3–17.7)
IMM GRANULOCYTES # BLD: 0.1 10E9/L (ref 0–0.4)
IMM GRANULOCYTES NFR BLD: 0.7 %
INR PPP: 1.16 (ref 0.86–1.14)
LMWH PPP CHRO-ACNC: 0.14 IU/ML
LMWH PPP CHRO-ACNC: 0.19 IU/ML
LYMPHOCYTES # BLD AUTO: 1 10E9/L (ref 0.8–5.3)
LYMPHOCYTES NFR BLD AUTO: 10.4 %
MAGNESIUM SERPL-MCNC: 2.3 MG/DL (ref 1.6–2.3)
MCH RBC QN AUTO: 32 PG (ref 26.5–33)
MCHC RBC AUTO-ENTMCNC: 32.3 G/DL (ref 31.5–36.5)
MCV RBC AUTO: 99 FL (ref 78–100)
MONOCYTES # BLD AUTO: 0.6 10E9/L (ref 0–1.3)
MONOCYTES NFR BLD AUTO: 6.1 %
NEUTROPHILS # BLD AUTO: 7.8 10E9/L (ref 1.6–8.3)
NEUTROPHILS NFR BLD AUTO: 80.4 %
NRBC # BLD AUTO: 0 10*3/UL
NRBC BLD AUTO-RTO: 0 /100
OXYHGB MFR BLDV: 75 %
PCO2 BLDV: 54 MM HG (ref 40–50)
PH BLDV: 7.34 PH (ref 7.32–7.43)
PHOSPHATE SERPL-MCNC: 2.8 MG/DL (ref 2.5–4.5)
PLATELET # BLD AUTO: 183 10E9/L (ref 150–450)
PO2 BLDV: 44 MM HG (ref 25–47)
POTASSIUM SERPL-SCNC: 4 MMOL/L (ref 3.4–5.3)
PREALB SERPL IA-MCNC: 7 MG/DL (ref 15–45)
PROT SERPL-MCNC: 5.6 G/DL (ref 6.8–8.8)
RBC # BLD AUTO: 3.38 10E12/L (ref 4.4–5.9)
SODIUM SERPL-SCNC: 144 MMOL/L (ref 133–144)
TRIGL SERPL-MCNC: 44 MG/DL
WBC # BLD AUTO: 9.8 10E9/L (ref 4–11)

## 2018-03-12 PROCEDURE — 25000128 H RX IP 250 OP 636: Performed by: PHYSICIAN ASSISTANT

## 2018-03-12 PROCEDURE — 25000125 ZZHC RX 250

## 2018-03-12 PROCEDURE — 84478 ASSAY OF TRIGLYCERIDES: CPT | Performed by: INTERNAL MEDICINE

## 2018-03-12 PROCEDURE — 82805 BLOOD GASES W/O2 SATURATION: CPT | Performed by: INTERNAL MEDICINE

## 2018-03-12 PROCEDURE — 94002 VENT MGMT INPAT INIT DAY: CPT

## 2018-03-12 PROCEDURE — 25000128 H RX IP 250 OP 636: Performed by: INTERNAL MEDICINE

## 2018-03-12 PROCEDURE — 40000239 ZZH STATISTIC VAT ROUNDS

## 2018-03-12 PROCEDURE — 25000125 ZZHC RX 250: Performed by: PHYSICIAN ASSISTANT

## 2018-03-12 PROCEDURE — 85610 PROTHROMBIN TIME: CPT | Performed by: HOSPITALIST

## 2018-03-12 PROCEDURE — 80053 COMPREHEN METABOLIC PANEL: CPT | Performed by: INTERNAL MEDICINE

## 2018-03-12 PROCEDURE — 94003 VENT MGMT INPAT SUBQ DAY: CPT

## 2018-03-12 PROCEDURE — 31500 INSERT EMERGENCY AIRWAY: CPT

## 2018-03-12 PROCEDURE — 40000986 XR ABDOMEN PORT 1 VW

## 2018-03-12 PROCEDURE — 99291 CRITICAL CARE FIRST HOUR: CPT | Performed by: PHYSICIAN ASSISTANT

## 2018-03-12 PROCEDURE — 84134 ASSAY OF PREALBUMIN: CPT | Performed by: INTERNAL MEDICINE

## 2018-03-12 PROCEDURE — 83735 ASSAY OF MAGNESIUM: CPT | Performed by: INTERNAL MEDICINE

## 2018-03-12 PROCEDURE — 85520 HEPARIN ASSAY: CPT | Performed by: HOSPITALIST

## 2018-03-12 PROCEDURE — 25000125 ZZHC RX 250: Performed by: HOSPITALIST

## 2018-03-12 PROCEDURE — 85025 COMPLETE CBC W/AUTO DIFF WBC: CPT | Performed by: INTERNAL MEDICINE

## 2018-03-12 PROCEDURE — 40000986 XR CHEST PORT 1 VW

## 2018-03-12 PROCEDURE — 00000146 ZZHCL STATISTIC GLUCOSE BY METER IP

## 2018-03-12 PROCEDURE — 99291 CRITICAL CARE FIRST HOUR: CPT | Performed by: INTERNAL MEDICINE

## 2018-03-12 PROCEDURE — 40000275 ZZH STATISTIC RCP TIME EA 10 MIN

## 2018-03-12 PROCEDURE — 20000003 ZZH R&B ICU

## 2018-03-12 PROCEDURE — 40000008 ZZH STATISTIC AIRWAY CARE

## 2018-03-12 PROCEDURE — 25000128 H RX IP 250 OP 636

## 2018-03-12 PROCEDURE — 40000671 ZZH STATISTIC ANESTHESIA CASE

## 2018-03-12 PROCEDURE — 84100 ASSAY OF PHOSPHORUS: CPT | Performed by: INTERNAL MEDICINE

## 2018-03-12 PROCEDURE — 25000128 H RX IP 250 OP 636: Performed by: NURSE ANESTHETIST, CERTIFIED REGISTERED

## 2018-03-12 PROCEDURE — 94640 AIRWAY INHALATION TREATMENT: CPT | Mod: 76

## 2018-03-12 PROCEDURE — 71045 X-RAY EXAM CHEST 1 VIEW: CPT

## 2018-03-12 PROCEDURE — 31500 INSERT EMERGENCY AIRWAY: CPT | Performed by: NURSE ANESTHETIST, CERTIFIED REGISTERED

## 2018-03-12 RX ORDER — PROPOFOL 10 MG/ML
5-75 INJECTION, EMULSION INTRAVENOUS CONTINUOUS
Status: DISCONTINUED | OUTPATIENT
Start: 2018-03-12 | End: 2018-03-12

## 2018-03-12 RX ORDER — PROPOFOL 10 MG/ML
5-75 INJECTION, EMULSION INTRAVENOUS CONTINUOUS
Status: DISCONTINUED | OUTPATIENT
Start: 2018-03-12 | End: 2018-03-20

## 2018-03-12 RX ORDER — PROPOFOL 10 MG/ML
INJECTION, EMULSION INTRAVENOUS PRN
Status: DISCONTINUED | OUTPATIENT
Start: 2018-03-12 | End: 2018-03-12

## 2018-03-12 RX ORDER — IPRATROPIUM BROMIDE AND ALBUTEROL SULFATE 2.5; .5 MG/3ML; MG/3ML
3 SOLUTION RESPIRATORY (INHALATION)
Status: DISCONTINUED | OUTPATIENT
Start: 2018-03-12 | End: 2018-03-23

## 2018-03-12 RX ADMIN — ASCORBIC ACID, VITAMIN A PALMITATE, CHOLECALCIFEROL, THIAMINE HYDROCHLORIDE, RIBOFLAVIN-5 PHOSPHATE SODIUM, PYRIDOXINE HYDROCHLORIDE, NIACINAMIDE, DEXPANTHENOL, ALPHA-TOCOPHEROL ACETATE, VITAMIN K1, FOLIC ACID, BIOTIN, CYANOCOBALAMIN: 200; 3300; 200; 6; 3.6; 6; 40; 15; 10; 150; 600; 60; 5 INJECTION, SOLUTION INTRAVENOUS at 03:22

## 2018-03-12 RX ADMIN — HYDROMORPHONE HYDROCHLORIDE 0.2 MG: 1 INJECTION, SOLUTION INTRAMUSCULAR; INTRAVENOUS; SUBCUTANEOUS at 12:37

## 2018-03-12 RX ADMIN — PIPERACILLIN SODIUM,TAZOBACTAM SODIUM 4.5 G: 4; .5 INJECTION, POWDER, FOR SOLUTION INTRAVENOUS at 16:10

## 2018-03-12 RX ADMIN — CHLORHEXIDINE GLUCONATE 15 ML: 1.2 RINSE ORAL at 19:43

## 2018-03-12 RX ADMIN — PROPOFOL 10 MCG/KG/MIN: 10 INJECTION, EMULSION INTRAVENOUS at 03:01

## 2018-03-12 RX ADMIN — FAMOTIDINE 20 MG: 10 INJECTION, SOLUTION INTRAVENOUS at 21:12

## 2018-03-12 RX ADMIN — IPRATROPIUM BROMIDE AND ALBUTEROL SULFATE 3 ML: .5; 3 SOLUTION RESPIRATORY (INHALATION) at 19:15

## 2018-03-12 RX ADMIN — PROPOFOL 25 MCG/KG/MIN: 10 INJECTION, EMULSION INTRAVENOUS at 18:46

## 2018-03-12 RX ADMIN — Medication 0.25 MG/MIN: at 14:50

## 2018-03-12 RX ADMIN — HEPARIN SODIUM 1500 UNITS/HR: 10000 INJECTION, SOLUTION INTRAVENOUS at 16:47

## 2018-03-12 RX ADMIN — HYDROMORPHONE HYDROCHLORIDE 0.2 MG: 1 INJECTION, SOLUTION INTRAMUSCULAR; INTRAVENOUS; SUBCUTANEOUS at 23:25

## 2018-03-12 RX ADMIN — FAMOTIDINE 20 MG: 10 INJECTION, SOLUTION INTRAVENOUS at 10:04

## 2018-03-12 RX ADMIN — PIPERACILLIN SODIUM,TAZOBACTAM SODIUM 4.5 G: 4; .5 INJECTION, POWDER, FOR SOLUTION INTRAVENOUS at 04:57

## 2018-03-12 RX ADMIN — PROPOFOL 25 MCG/KG/MIN: 10 INJECTION, EMULSION INTRAVENOUS at 09:59

## 2018-03-12 RX ADMIN — I.V. FAT EMULSION 250 ML: 20 EMULSION INTRAVENOUS at 09:54

## 2018-03-12 RX ADMIN — IPRATROPIUM BROMIDE AND ALBUTEROL SULFATE 3 ML: .5; 3 SOLUTION RESPIRATORY (INHALATION) at 15:50

## 2018-03-12 RX ADMIN — PIPERACILLIN SODIUM,TAZOBACTAM SODIUM 4.5 G: 4; .5 INJECTION, POWDER, FOR SOLUTION INTRAVENOUS at 10:00

## 2018-03-12 RX ADMIN — PROPOFOL 100 MG: 10 INJECTION, EMULSION INTRAVENOUS at 03:06

## 2018-03-12 RX ADMIN — CHLORHEXIDINE GLUCONATE 15 ML: 1.2 RINSE ORAL at 07:46

## 2018-03-12 RX ADMIN — HYDROMORPHONE HYDROCHLORIDE 0.2 MG: 1 INJECTION, SOLUTION INTRAMUSCULAR; INTRAVENOUS; SUBCUTANEOUS at 19:26

## 2018-03-12 NOTE — PROGRESS NOTES
Surgery    Patient re-intubated. AXR shows dilated loops. Resting without distress during exam    ABD- Soft-Mild distention. No response/tenderness to deep or soft palpation    A/P  AXR reviewed. Likely has prolonged ileus given critical illness and ICU course. Replace OG/NGT and continue TPN for now.     Robel Gomez M.D.  [facility]  [phone]

## 2018-03-12 NOTE — PROGRESS NOTES
Called by bedside RN to eval resp status, was extubated yesterday, now with increased WOB, arousable but somnolent. ABG shows increased PCO2 although pH is ok, CXR with no sig change, ? Some pulm edema. Tried BiPAP but was not moving air well, seemed to be having some type of upper airway obstruction. Continued to have increased WOB with accessory muscle use so elected to intubate the patient.    Critical care time 45 minutes

## 2018-03-12 NOTE — PROGRESS NOTES
CLINICAL NUTRITION SERVICES - REASSESSMENT NOTE      Recommendations Ordered by Registered Dietitian (RD):   If BGs remain acceptable, recommend consider decrease accuchecks to once daily (6 am) per protocol.   Malnutrition:  (3/6)  % Weight Loss:  Unable to determine without recent history  % Intake:  </= 50% for >/= 5 days (severe malnutrition)  Subcutaneous Fat Loss:  Orbital region moderate depletion and Upper arm region moderate depletion  Muscle Loss:  Temporal region severe depletion and Clavicle bone region severe depletion  Fluid Retention:  None noted      Malnutrition Diagnosis: Severe malnutrition  In Context of:  Acute illness or injury  Environmental or social circumstances        EVALUATION OF PROGRESS TOWARD GOALS   Diet:  NPO on vent    Nutrition Support:  TPN was increased to final goal on 3/8 as below:    Nutrition Support Parenteral:  Type of Access: PICC  Parenteral Frequency:  Continuous  Parenteral Regimen:  D15 AA5 90 mL/hr + Lipids 250 mL every other day  Total Parenteral Provisions:  1784 kcals, 108 gm pro (1.7 gm/kg), 324 gm CHO    The D5 IVF at 25 mL/hr was D/Cd on 3/9.  Propofol is running at 9.9 mL/hr = 261 kcals (lipid).  Total (TPN/Lipid + Propofol):  2045 kcals (32 kcal/kg)    Intake/Tolerance:    BGM:  118, 127, 90, 117, 103, 128 - Off Insulin drip on 3/9 and currently on Med Res SSI .  Pt has not required Insulin via SS since Insulin drip off and TPN goal achieved.  Labs today noted and acceptable.  TG 44 (NL).  Wt:  64.9 kg (up 1.1 kg from 3/8 wt).      Dosing Weight::  63.9 (3/8 wt)     ASSESSED NUTRITION NEEDS:  Energy needs: 5648-0148 kcals (30-35 kcal/kg) - malnourished, vented  Protein Needs:   grams protein (1.5-2.0 g pro/Kg) - Repletion and hypercatabolism with acute illness, possible CRF    NEW FINDINGS:   3/9:  WOCN - Lt upper lip: Dried scab Rt upper lip. ? Etiology --> Doubt area r/t to ET tube.    3/11:  Extubated --> Intubated on 3/12.  N mL yest.  Pt  pulled out NG tube per RN in rounds today.    3/12:  AXR = : Again seen are multiple dilated small bowel loops  compatible with small bowel obstruction.  Thus plan to replace OG/NGT.     Previous Goals (3/8):   TPN + D5 IVF + prop will meet % of needs  Evaluation: Met, even with D5 IVF now off    Previous Nutrition Diagnosis (3/8):   Inadequate protein intake related to TPN not yet at goal as evidenced by meeting 87% of his PRO needs.  Evaluation:  Resolved      CURRENT NUTRITION DIAGNOSIS  No nutrition diagnosis identified at this time     INTERVENTIONS  Recommendations / Nutrition Prescription  Continue TPN/Lipids as above.    If BGs remain acceptable, recommend consider decrease accuchecks to once daily (6 am) per protocol.    Implementation  Collaboration and Referral of Nutrition care - Discussed pt during ICU interdisciplinary rounds this am and with Pharmacist who will continue the same TPN.    Goals  TPN/Lipids + Propofol will meet % estimated needs.      MONITORING AND EVALUATION:  Progress towards goals will be monitored and evaluated per protocol and Practice Guidelines    Wendi Swanson, RD, LD, CNSC

## 2018-03-12 NOTE — PLAN OF CARE
Problem: Patient Care Overview  Goal: Plan of Care/Patient Progress Review  OT and PT: Spoke with nursing, pt reintubated overnight and no appropriate for therapies today.

## 2018-03-12 NOTE — PLAN OF CARE
Problem: Patient Care Overview  Goal: Plan of Care/Patient Progress Review  Outcome: No Change  Pt remains on full vent support sedated with propofol gtt. OGT placed with minimal output. Bowel sounds not audible. Dilaudid prn pain. Pt up to chair for 2 hours. Daughter at bedside - updated on plan of care. Cont to monitor.

## 2018-03-12 NOTE — PROGRESS NOTES
[facility]    Critical Care Service  Progress Note    Date of Service (when I saw the patient): 03/12/2018     Problem List   SBO s/p ex lap, TISHA and partial small bowel resection 3/4  A-fib with RVR  Acute hypercarbic respiratory failure s/p re-intubation  ?Aspiration PNA  Protein calorie malnutrition, on TPN  Pulmonary fibrosis     Main Plans for Today  Re-intubated overnight   Replace NG for decompression  Continue TPN  Add duoneb     Assessment & Plan   Joseluis Falcon is a 85 male initially admitted for SBO on 2/27, initially managed non-operatively now s/p lysis of adhesions and partial small bowel resection on 3/4. Decompensated with encephalopathy and respiratory failure night of 3/6, intubated and transferred to ICU.     Neurology/Psychiatry:   1. Encephalopathy/agitation  2. Analgesia/Anxiety  Plan  - propofol gtt, goal RASS  0   - prn dilaudid        Cardiovascular:   1.Hemodynamics -currently appropriate   2.Rhythm - SR, previous atrial fibrillation with RVR  Plan  - follow hemodynamics, goal MAP >65, no pressors   - now back in sinus rhythm.  Amio drip 0.25/min.  This is dose equivalent to 400 PO per day.  Will maintain here for 4 days and then transition to 0.125/min (equvalnet to 200 PO per day) until able to take PO  - continue heparin gtt for afib      Pulmonary/Ventilator Management:   1. Acute hypercarbic respiratory failure - in setting of recent surgery, likely aspiration with evidence of REJI physiology on initial eval, extubated 3/11 and re-intubated 3/12 after failed BiPAP.   2. ?Aspiration pneumonia  3. Mild emphysema/fibrosis - ? Age related   Plan  - Extubated 3/11, re-intubated overnight 3/12. Increased WOB and poor air movement. CXR appears minimally changed.   - pulmonary toilet       GI and Nutrition :   1. Small bowel obstruction s/p ex lap, TISHA and partial small bowel resection 3/4 - with subsequent imaging suggestive of continued obstruction  2. Severe protein calorie  malnutrition  3. GERD/Carlson's   Plan  - NG tube removed, will need to be replaced  - H2 blocker   - TPN, continue for now per surgery   - surgery following   - no return of bowel function yet       Renal/Fluids/Electrolytes:   1. ?Mild BJ, slight elevation in creatinine --- resolved  2. Electrolyte abnormality - hypernatremia resolved  3. Acid/base status - resp alkalosis-improved  4. Volume status - Goal even.  Plan  - maintain hemodynamics   - monitor function and electrolytes as needed with replacement per ICU protocols.  - generally avoid nephrotoxic agents such as NSAID, IV contrast unless specifically required  - adjust medications as needed for renal clearance  - follow I/O's as appropriate      Infectious Disease:   1. ?Aspiration pneumonia/sepsis  Plan  - Sputum NGTD, procalcitonin negative    - no WBC and afebrile   - zosyn has been on for 7 days for possible aspiration PNA      Endocrine:   1. Stress induced hyperglycemia  Plan  - ICU insulin protocol, goal sugar <180  - SSI      Hematology/Oncology:   1. No active issue, empirically anticoagulated   2. Thrombocytopenia, resolved   Plan  - serial cbc, coags as indicated      IV/Access:   1. Venous access - PICC   2. Arterial access - none  Plan  - central access required and necessary      ICU Prophylaxis:   1. DVT: Hep gtt   2. VAP: HOB 30 degrees, chlorhexidine rinse  3. Stress Ulcer: H2 blocker  4. Restraints: Nonviolent soft two point restraints required and necessary for patient safety and continued cares and good effect as patient continues to pull at necessary lines, tubes despite education and distraction. Will readdress daily.   5. Wound care - per unit routine   6. Feeding - TPN   7. Family Update: pending   8. Disposition - ICU     Tierra Ling    Time Spent on this Encounter   Billing:  I spent 45 minutes bedside and on the inpatient unit today managing the critical care of Joseluis Falcon in relation to the issues listed in this  note.    Interval History   Course reviewed. Patient intubated overnight after failing extubation and trial of BiPAP day before. CXR appeared unchanged, poor air movement, appeared to have some upper airway obstruction per overnight MD. Now intubated and sedated. NG removed with extubation and not replaced.     Physical Exam   Temp: 98.9  F (37.2  C) Temp src: Axillary Temp  Min: 98.2  F (36.8  C)  Max: 98.9  F (37.2  C) BP: 124/57   Heart Rate: 56 Resp: 22 SpO2: 97 % O2 Device: Mechanical Ventilator Oxygen Delivery: 3 LPM  Vitals:    03/10/18 0200 03/11/18 0600 03/12/18 0630   Weight: 63.5 kg (139 lb 15.9 oz) 65.9 kg (145 lb 4.5 oz) 64.9 kg (143 lb 1.3 oz)     I/O last 3 completed shifts:  In: 2764.4 [I.V.:604.4]  Out: 3625 [Urine:3525; Emesis/NG output:100]    GEN: Cachectic male, resting in bed, sedated on exam   EYES: PERRL, Anicteric sclera.   HEENT:  Normocephalic, atraumatic, trachea midline, ETT secure  CV: RRR, no murmurs  PULM/CHEST: Clear breath sounds bilaterally, no wheeze  GI: no bowel sounds, soft, non-tender, laparoscopic abdominal incisions covered  : alba catheter in place, urine yellow and clear  EXTREMITIES: no peripheral edema  NEURO: sedated on exam, does open eyes to voice, not following commands this AM  SKIN: warm and dry   Imaging personally reviewed: CXR with ETT 2/7cm above luly   ECG- SR    Medications     propofol (DIPRIVAN) infusion       parenteral nutrition - Clinimix E 90 mL/hr at 03/12/18 0322     IV fluid REPLACEMENT ONLY       amiodarone 0.25 mg/min (03/12/18 0700)     HEParin 1,500 Units/hr (03/12/18 0745)       piperacillin-tazobactam  4.5 g Intravenous Q6H     famotidine  20 mg Intravenous Q12H     insulin aspart  1-6 Units Subcutaneous Q4H     chlorhexidine  15 mL Mouth/Throat Q12H     sodium chloride (PF)  3 mL Intracatheter Q8H     sodium chloride (PF)  10 mL Intracatheter Q8H     lipids  250 mL Intravenous Every Other Day     QUEtiapine  12.5 mg Oral At Bedtime        Data     Recent Labs  Lab 03/12/18  0620 03/12/18  0220 03/11/18  0620 03/10/18  1936  03/07/18  0515 03/06/18  0857   WBC  --  9.8 12.5* 12.8*  < > 7.7  --    HGB  --  10.8* 9.9* 10.2*  < > 12.4*  --    MCV  --  99 99 99  < > 105*  --    PLT  --  183 149* 141*  < > 131*  --    INR 1.16*  --   --   --   --  1.23*  --    NA  --  144 140 140  < > 149*  --    POTASSIUM  --  4.0 4.2 4.1  < > 4.1  --    CHLORIDE  --  113* 108 108  < > 115*  --    CO2  --  27 27 28  < > 32  --    BUN  --  26 27 27  < > 27  --    CR  --  0.92 0.97 1.01  < > 1.25  --    ANIONGAP  --  4 5 4  < > 2*  --    TOBI  --  7.8* 7.6* 7.4*  < > 7.5*  --    GLC  --  110* 114* 130*  < > 156*  --    ALBUMIN  --  1.8*  --   --   --  2.1*  --    PROTTOTAL  --  5.6*  --   --   --  5.3*  --    BILITOTAL  --  0.7  --   --   --  1.0  --    ALKPHOS  --  61  --   --   --  51  --    ALT  --  22  --   --   --  12  --    AST  --  28  --   --   --  18  --    TROPI  --   --   --   --   --  0.024 <0.015   < > = values in this interval not displayed.  Recent Results (from the past 24 hour(s))   XR Chest Port 1 View    Narrative    CHEST ONE VIEW UPRIGHT 3/11/2018 8:40 AM     HISTORY: Lines and tubes, evaluate interval change in pneumonia.     COMPARISON: 3/7/2018      Impression    IMPRESSION: Tubes and lines are not significantly changed.  Interstitial changes in both lungs unchanged from previous. Left lower  lobe atelectasis and/or infiltrate unchanged.    LIBBY DEVRIES MD   XR Chest Port 1 View    Narrative    XR CHEST PORT 1 VIEW   3/12/2018 2:59 AM     INDICATION: ?pneumonia.    COMPARISON: 3/11/2018.      Impression    IMPRESSION: No significant change. Mild patchy opacity at the left  base may be pneumonia or atelectasis. Slight diffuse interstitial  prominence may be due to fibrosis. No new focal airspace disease.  Right PICC tip in SVC. Stable heart size.    MOHIT PICKARD MD   XR Chest Port 1 View    Narrative    XR CHEST PORT 1 VIEW   3/12/2018 4:25  AM     INDICATION: ETT placement.    COMPARISON: 3/12/2018 at 0239 hours.      Impression    IMPRESSION: New ETT with tip in the lower trachea, approximately 3.3  cm above the luly. Otherwise, no change from the recent film.    MOHIT PICKARD MD   XR Abdomen Port 1 View    Narrative    XR ABDOMEN PORT 1 VIEW   3/12/2018 4:25 AM     INDICATION: NG tube placement.    COMPARISON: 3/4/2018 plain film. CT 2/27/2018.      Impression    IMPRESSION: Again seen are multiple dilated small bowel loops  compatible with small bowel obstruction. A nasogastric tube tip is not  seen in the stomach. On the chest x-ray performed coincidentally,  there is partial visualization of a tube projecting over the neck and  upper chest which is probably the patient's coiled NG tube, which  needs to be repositioned.    MOHIT PICKARD MD

## 2018-03-12 NOTE — PROGRESS NOTES
Family, daughter ryan, notified and updated this morning about pt's status. Informed patient  re intubated overnight.

## 2018-03-12 NOTE — PROGRESS NOTES
pts agitated and restless. O2 sat in mid 90's. 3l nasal cannula placed to help incase of hypoxia despite satting good in room air. pts pulled ngt out. md notified. Given order for haldol prn for agitation. Will reassess.

## 2018-03-12 NOTE — ANESTHESIA CARE TRANSFER NOTE
Patient: Joseluis Falcon    * No procedures listed *    Diagnosis: * No pre-op diagnosis entered *  Diagnosis Additional Information: No value filed.    Anesthesia Type:   No value filed.     Note:  Airway :ETT  Destination: did not transfer pt.  Comments: Pt's nurse in room for intubation procedure, RT and MD at bedside. Sats 100% with ETT in place. Placed on ventilator by RT.       Vitals: (Last set prior to Anesthesia Care Transfer)              Electronically Signed By: JASMINA Ochoa CRNA  March 12, 2018  3:21 AM

## 2018-03-12 NOTE — PROGRESS NOTES
Shift summary nursing note  Assumed care on 03/10/2018 from 7 p-7a    @2300 MD called to bedside to evaluate resp status. pts agitated, restless and altered mental status. Pulled ngt. Getting out of bed. O2 sat in mid 90's. 3l nasal cannula placed to help incase of hypoxic despite satting well  in room air. pts pulled ngt out. Given order for haldol prn for agitation. Assign a sitter. Will reassess.  @0300 pts continue to have difficulty breathing. md notified and the bedside. Nasal trumpet placed and deep suctioning performed. pts placed on BiPAP but still has difficulty breathing. VBG, cmp and cbc drawn. Stat xray of chest. Stat anesthesia ordered and pts intubated. propofol reorder for sedation. BUE restraints applied. Tried to place NGT  with no success.    BODY SYSTEM ASSESSMENT       Neuro: sedated with propofol. Perrla. Withdraws to pain stimuli. Restraints BUE  CV: sinus mitchel with heart rate in 50-60's. Blood pressure stable with MAP >80.  Pulses palpable. On amio drip for arrhythmias.  Pulmo: intubated at 0300 3/12/18 again for difficulty breathing CMV; FiO2 40%, , Rate 16 and PEEP 5. Diminished lung sounds. Small secretions for ETT.   GI/: not audible BS. Segal in place with good UOP.   Endocrine: blood sugar checks Q 4 hrs on SSI. Within no normal no coverage needed in my shift.  Skin: frail and fragile skin with  multiple bruises on BUE. Sore/scabbing/cracked  Lips and Vaseline applied. coccyx with mepilex UTV.   Lines/drip. 3L right PICC and PIV'S. Hep/amio/propofol/clinimix drip running.  plan continue  to monitor.

## 2018-03-12 NOTE — PLAN OF CARE
Problem: Patient Care Overview  Goal: Plan of Care/Patient Progress Review  Outcome: Improving  Patient extubated at 1355 without incident. Tolerated aerosol mask for ~1 hour then transitioned to room air.   Oriented only to self.   No bowel tones present.   Continue heparin, amiodarone, TPN at same rates.

## 2018-03-12 NOTE — PROGRESS NOTES
[facility] ICU RESPIRATORY NOTE  Date of Admission: 2/27/18  Date of Intubation (most recent): 3/12/18  Reason for Mechanical Ventilation: Airway obstruction/resp. distress  Number of Days on Mechanical Ventilation: 1  Met Criteria for Pressure Support Trial:  Length of Pressure Support Trial:  Reason for Stopping Pressure Support Trial:  Reason for No Pressure Support Trial: Per MD    Significant Events Today: Pt re-intubated for resp. distress    ABG Results:     Recent Labs  Lab 03/07/18 2012 03/07/18  1118 03/07/18  0800 03/06/18  1006   PH 7.49* 7.50* Canceled, Test credited 7.46*   PCO2 39 37 Canceled, Test credited 44   PO2 149* 172* Canceled, Test credited 83   HCO3 29* 29* Canceled, Test credited 32*   O2PER  --  50% Canceled, Test credited  --      Ventilation Mode: CMV/AC  (Continuous Mandatory Ventilation/ Assist Control)  FiO2 (%): 70 %  Rate Set (breaths/minute): 16 breaths/min  Tidal Volume Set (mL): 450 mL  PEEP (cm H2O): 5 cmH2O  Pressure Support (cm H2O): 5 cmH2O  Oxygen Concentration (%): 70 %  Resp: 13      James Love RT

## 2018-03-13 LAB
ANION GAP SERPL CALCULATED.3IONS-SCNC: 3 MMOL/L (ref 3–14)
BUN SERPL-MCNC: 28 MG/DL (ref 7–30)
CALCIUM SERPL-MCNC: 7.6 MG/DL (ref 8.5–10.1)
CHLORIDE SERPL-SCNC: 108 MMOL/L (ref 94–109)
CO2 SERPL-SCNC: 29 MMOL/L (ref 20–32)
CREAT SERPL-MCNC: 0.9 MG/DL (ref 0.66–1.25)
ERYTHROCYTE [DISTWIDTH] IN BLOOD BY AUTOMATED COUNT: 13 % (ref 10–15)
GFR SERPL CREATININE-BSD FRML MDRD: 80 ML/MIN/1.7M2
GLUCOSE BLDC GLUCOMTR-MCNC: 101 MG/DL (ref 70–99)
GLUCOSE BLDC GLUCOMTR-MCNC: 104 MG/DL (ref 70–99)
GLUCOSE BLDC GLUCOMTR-MCNC: 110 MG/DL (ref 70–99)
GLUCOSE BLDC GLUCOMTR-MCNC: 117 MG/DL (ref 70–99)
GLUCOSE BLDC GLUCOMTR-MCNC: 117 MG/DL (ref 70–99)
GLUCOSE BLDC GLUCOMTR-MCNC: 123 MG/DL (ref 70–99)
GLUCOSE BLDC GLUCOMTR-MCNC: 93 MG/DL (ref 70–99)
GLUCOSE SERPL-MCNC: 121 MG/DL (ref 70–99)
HCT VFR BLD AUTO: 27.8 % (ref 40–53)
HGB BLD-MCNC: 8.9 G/DL (ref 13.3–17.7)
HGB BLD-MCNC: 9.4 G/DL (ref 13.3–17.7)
LMWH PPP CHRO-ACNC: 0.27 IU/ML
MAGNESIUM SERPL-MCNC: 2.2 MG/DL (ref 1.6–2.3)
MCH RBC QN AUTO: 31.8 PG (ref 26.5–33)
MCHC RBC AUTO-ENTMCNC: 32 G/DL (ref 31.5–36.5)
MCV RBC AUTO: 99 FL (ref 78–100)
PLATELET # BLD AUTO: 219 10E9/L (ref 150–450)
POTASSIUM SERPL-SCNC: 4.4 MMOL/L (ref 3.4–5.3)
RBC # BLD AUTO: 2.8 10E12/L (ref 4.4–5.9)
SODIUM SERPL-SCNC: 140 MMOL/L (ref 133–144)
WBC # BLD AUTO: 7.6 10E9/L (ref 4–11)

## 2018-03-13 PROCEDURE — 99291 CRITICAL CARE FIRST HOUR: CPT | Performed by: PHYSICIAN ASSISTANT

## 2018-03-13 PROCEDURE — 40000275 ZZH STATISTIC RCP TIME EA 10 MIN

## 2018-03-13 PROCEDURE — 00000146 ZZHCL STATISTIC GLUCOSE BY METER IP

## 2018-03-13 PROCEDURE — 40000008 ZZH STATISTIC AIRWAY CARE

## 2018-03-13 PROCEDURE — 25000128 H RX IP 250 OP 636: Performed by: PHYSICIAN ASSISTANT

## 2018-03-13 PROCEDURE — 80048 BASIC METABOLIC PNL TOTAL CA: CPT | Performed by: HOSPITALIST

## 2018-03-13 PROCEDURE — 25000125 ZZHC RX 250: Performed by: HOSPITALIST

## 2018-03-13 PROCEDURE — 40000257 ZZH STATISTIC CONSULT NO CHARGE VASC ACCESS

## 2018-03-13 PROCEDURE — 83735 ASSAY OF MAGNESIUM: CPT | Performed by: HOSPITALIST

## 2018-03-13 PROCEDURE — 40000239 ZZH STATISTIC VAT ROUNDS

## 2018-03-13 PROCEDURE — 94640 AIRWAY INHALATION TREATMENT: CPT

## 2018-03-13 PROCEDURE — 25000128 H RX IP 250 OP 636: Performed by: HOSPITALIST

## 2018-03-13 PROCEDURE — 25000125 ZZHC RX 250: Performed by: PHYSICIAN ASSISTANT

## 2018-03-13 PROCEDURE — 94003 VENT MGMT INPAT SUBQ DAY: CPT

## 2018-03-13 PROCEDURE — 85027 COMPLETE CBC AUTOMATED: CPT | Performed by: HOSPITALIST

## 2018-03-13 PROCEDURE — 85520 HEPARIN ASSAY: CPT | Performed by: HOSPITALIST

## 2018-03-13 PROCEDURE — 94640 AIRWAY INHALATION TREATMENT: CPT | Mod: 76

## 2018-03-13 PROCEDURE — 25000125 ZZHC RX 250

## 2018-03-13 PROCEDURE — 85018 HEMOGLOBIN: CPT | Performed by: PHYSICIAN ASSISTANT

## 2018-03-13 PROCEDURE — 20000003 ZZH R&B ICU

## 2018-03-13 RX ADMIN — HEPARIN SODIUM 1500 UNITS/HR: 10000 INJECTION, SOLUTION INTRAVENOUS at 10:48

## 2018-03-13 RX ADMIN — IPRATROPIUM BROMIDE AND ALBUTEROL SULFATE 3 ML: .5; 3 SOLUTION RESPIRATORY (INHALATION) at 15:16

## 2018-03-13 RX ADMIN — PROPOFOL 30 MCG/KG/MIN: 10 INJECTION, EMULSION INTRAVENOUS at 10:48

## 2018-03-13 RX ADMIN — Medication 0.12 MG/MIN: at 10:48

## 2018-03-13 RX ADMIN — CHLORHEXIDINE GLUCONATE 15 ML: 1.2 RINSE ORAL at 07:43

## 2018-03-13 RX ADMIN — PROPOFOL 25 MCG/KG/MIN: 10 INJECTION, EMULSION INTRAVENOUS at 02:51

## 2018-03-13 RX ADMIN — IPRATROPIUM BROMIDE AND ALBUTEROL SULFATE 3 ML: .5; 3 SOLUTION RESPIRATORY (INHALATION) at 11:01

## 2018-03-13 RX ADMIN — IPRATROPIUM BROMIDE AND ALBUTEROL SULFATE 3 ML: .5; 3 SOLUTION RESPIRATORY (INHALATION) at 07:23

## 2018-03-13 RX ADMIN — ASCORBIC ACID, VITAMIN A PALMITATE, CHOLECALCIFEROL, THIAMINE HYDROCHLORIDE, RIBOFLAVIN-5 PHOSPHATE SODIUM, PYRIDOXINE HYDROCHLORIDE, NIACINAMIDE, DEXPANTHENOL, ALPHA-TOCOPHEROL ACETATE, VITAMIN K1, FOLIC ACID, BIOTIN, CYANOCOBALAMIN: 200; 3300; 200; 6; 3.6; 6; 40; 15; 10; 150; 600; 60; 5 INJECTION, SOLUTION INTRAVENOUS at 01:11

## 2018-03-13 RX ADMIN — PROPOFOL 30 MCG/KG/MIN: 10 INJECTION, EMULSION INTRAVENOUS at 18:09

## 2018-03-13 RX ADMIN — HYDROMORPHONE HYDROCHLORIDE 0.2 MG: 1 INJECTION, SOLUTION INTRAMUSCULAR; INTRAVENOUS; SUBCUTANEOUS at 08:18

## 2018-03-13 RX ADMIN — IPRATROPIUM BROMIDE AND ALBUTEROL SULFATE 3 ML: .5; 3 SOLUTION RESPIRATORY (INHALATION) at 19:03

## 2018-03-13 RX ADMIN — HYDROMORPHONE HYDROCHLORIDE 0.2 MG: 1 INJECTION, SOLUTION INTRAMUSCULAR; INTRAVENOUS; SUBCUTANEOUS at 18:35

## 2018-03-13 RX ADMIN — FAMOTIDINE 20 MG: 10 INJECTION, SOLUTION INTRAVENOUS at 19:45

## 2018-03-13 RX ADMIN — CHLORHEXIDINE GLUCONATE 15 ML: 1.2 RINSE ORAL at 19:45

## 2018-03-13 RX ADMIN — ASCORBIC ACID, VITAMIN A PALMITATE, CHOLECALCIFEROL, THIAMINE HYDROCHLORIDE, RIBOFLAVIN-5 PHOSPHATE SODIUM, PYRIDOXINE HYDROCHLORIDE, NIACINAMIDE, DEXPANTHENOL, ALPHA-TOCOPHEROL ACETATE, VITAMIN K1, FOLIC ACID, BIOTIN, CYANOCOBALAMIN: 200; 3300; 200; 6; 3.6; 6; 40; 15; 10; 150; 600; 60; 5 INJECTION, SOLUTION INTRAVENOUS at 23:57

## 2018-03-13 RX ADMIN — FAMOTIDINE 20 MG: 10 INJECTION, SOLUTION INTRAVENOUS at 08:15

## 2018-03-13 NOTE — PROGRESS NOTES
Surgery    Patient still intubated. Didn't do well on weaning trial today. OG placed without any output. Still no significant bowel function.    Abdomen-soft and last distended compared to yesterday.    A/P  Prolonged ileus related to critical condition. OG is in appropriate position without significant output. Likely represents at least some resolution of ileus. He remaines soft without significant distention or tenderness. I would continue current ICU care and TPN. No indication for surgery or further imaging at this point. I discussed his care plan with daughter who is in agreement.    Robel Gomez M.D.  [facility]  [phone]

## 2018-03-13 NOTE — PLAN OF CARE
Problem: Patient Care Overview  Goal: Plan of Care/Patient Progress Review  OT and PT: Per discussion with nurse, patient not appropriate for therapy today.

## 2018-03-13 NOTE — PROGRESS NOTES
pts sedated and with same vent settings overnight. Dilaudid given for pain management. tpn, amio & hep drip running continues. Oral care and turning Q 2hrs. Bowel sounds not present and minimal output from OGT. No change overnight. Am labs results ok. Will continue to monitor closely .

## 2018-03-13 NOTE — PROGRESS NOTES
[facility]    Critical Care Service  Progress Note    Date of Service (when I saw the patient): 03/13/2018     Problem List   SBO s/p ex lap, TISHA and partial small bowel resection 3/4  A-fib with RVR  Acute hypercarbic respiratory failure s/p re-intubation  ?Aspiration PNA  Protein calorie malnutrition, on TPN  Pulmonary fibrosis      Main Plans for Today  Decrease amio gtt rate   Recheck Hgb   CXR tomorrow  Wean sedation  PS if able      Assessment & Plan   Joseluis Falcon is a 85 male initially admitted for SBO on 2/27, initially managed non-operatively now s/p lysis of adhesions and partial small bowel resection on 3/4. Decompensated with encephalopathy and respiratory failure night of 3/6, intubated and transferred to ICU. Extubated 3/11 and re-intubated 3/12 after increased WOB. Ongoing issues with ileus.       Neurology/Psychiatry:   1. Encephalopathy/agitation  2. Analgesia/Anxiety  Plan  - propofol gtt, goal RASS  0, wean as able and assess neuro status   - prn dilaudid        Cardiovascular:   1.Hemodynamics -currently appropriate   2.Rhythm - SR, previous atrial fibrillation with RVR  Plan  - follow hemodynamics, goal MAP >65, no pressors   - now back in sinus rhythm.  Amio drip 0.25/min. Per EP note, maintain here for 4 days and then transition to 0.125/min (equvalnet to 200 PO per day) until able to take PO. Will decrease to 0.125 today.   - continue heparin gtt for afib, will transition to coumadin once more stable and able to take PO      Pulmonary/Ventilator Management:   1. Acute hypercarbic respiratory failure -first respiratory failure in setting of recent surgery, likely aspiration with evidence of REJI physiology on initial eval. Extubated 3/11 and re-intubated 3/12 after increased WOB and failed BiPAP trial.   2. ?Aspiration pneumonia  3. Mild emphysema/fibrosis - ? Age related   Plan  - Extubated 3/11, re-intubated overnight 3/12. Increased WOB and poor air movement. CXR appears  minimally changed.   - pulmonary toilet   - recheck CXR tomorrow       GI and Nutrition :   1. Small bowel obstruction s/p ex lap, TISHA and partial small bowel resection 3/4 - with subsequent imaging suggestive of continued obstruction  2. Ileus   3. Severe protein calorie malnutrition  4. GERD/Carlson's   Plan  - Replaced NG tube, low intermittent suction   - H2 blocker   - TPN, continue for now per surgery   - surgery following  - continued dilated loops of bowel per AXR, no BMs and no bowel sounds. Surgery feels that this is consistent with prolonged ileus  - abd exam is benign       Renal/Fluids/Electrolytes:   1. ?Mild BJ, slight elevation in creatinine --- resolved  2. Electrolyte abnormality - hypernatremia resolved  3. Acid/base status - resp alkalosis-improved  4. Volume status - Goal even.  Plan  - maintain hemodynamics   - monitor function and electrolytes as needed with replacement per ICU protocols.  - generally avoid nephrotoxic agents such as NSAID, IV contrast unless specifically required  - adjust medications as needed for renal clearance  - follow I/O's as appropriate      Infectious Disease:   1. ?Aspiration pneumonia/sepsis  Plan  - Sputum NGTD, procalcitonin negative    - no WBC and afebrile   - zosyn course for suspected aspiration PNA, completed 7 days course   - no abx currently       Endocrine:   1. Stress induced hyperglycemia  Plan  - ICU insulin protocol, goal sugar <180  - SSI      Hematology/Oncology:   1. Normocytic anemia  2. Thrombocytopenia, resolved   Plan  - Hgb drop from 10.8-->8.9, no active signs of bleeding and no change in hemodynamics  - will recheck Hgb this AM      IV/Access:   1. Venous access - PICC   2. Arterial access - none  Plan  - central access required and necessary      ICU Prophylaxis:   1. DVT: Hep gtt   2. VAP: HOB 30 degrees, chlorhexidine rinse  3. Stress Ulcer: H2 blocker  4. Restraints: Nonviolent soft two point restraints required and necessary for  patient safety and continued cares and good effect as patient continues to pull at necessary lines, tubes despite education and distraction. Will readdress daily.   5. Wound care - per unit routine   6. Feeding - TPN   7. Family Update: pending   8. Disposition - ICU    Tierra Ling    Time Spent on this Encounter   Billing:  I spent 45 minutes bedside and on the inpatient unit today managing the critical care of Joseluis Falcon in relation to the issues listed in this note.    Interval History   Course reviewed. No acute events overnight. Patient remains intubated and on amio gtt. No return of bowel function or bowel sounds. NG has not produced much.     Physical Exam   Temp: 98.3  F (36.8  C) Temp src: Axillary Temp  Min: 98  F (36.7  C)  Max: 98.3  F (36.8  C) BP: 155/89   Heart Rate: 58 Resp: 27 SpO2: 100 % O2 Device: Mechanical Ventilator    Vitals:    03/11/18 0600 03/12/18 0630 03/13/18 0430   Weight: 65.9 kg (145 lb 4.5 oz) 64.9 kg (143 lb 1.3 oz) 65.8 kg (145 lb 1 oz)     I/O last 3 completed shifts:  In: 3545.86 [I.V.:1154.98]  Out: 1415 [Urine:1415]    GEN: Cachectic male, resting in bed, sedated on exam   EYES: PERRL, Anicteric sclera.   HEENT:  Normocephalic, atraumatic, trachea midline, ETT secure  CV: RRR, no murmurs  PULM/CHEST: Clear breath sounds bilaterally, no wheeze  GI: no bowel sounds, soft, non-tender, laparoscopic abdominal incisions covered  : alba catheter in place, urine yellow and clear  EXTREMITIES: no peripheral edema  NEURO: sedated on exam, does open eyes to voice, not following commands this AM  SKIN: warm and dry   Imaging personally reviewed: CXR with ETT 2/7cm above luly   ECG- SR    Medications     propofol (DIPRIVAN) infusion 25 mcg/kg/min (03/13/18 0251)     parenteral nutrition - Clinimix E 90 mL/hr at 03/13/18 0111     IV fluid REPLACEMENT ONLY       amiodarone 0.25 mg/min (03/12/18 1450)     HEParin 1,500 Units/hr (03/12/18 5492)       ipratropium - albuterol 0.5 mg/2.5  mg/3 mL  3 mL Nebulization 4x daily     famotidine  20 mg Intravenous Q12H     insulin aspart  1-6 Units Subcutaneous Q4H     chlorhexidine  15 mL Mouth/Throat Q12H     sodium chloride (PF)  3 mL Intracatheter Q8H     sodium chloride (PF)  10 mL Intracatheter Q8H     lipids  250 mL Intravenous Every Other Day       Data     Recent Labs  Lab 03/13/18  0535 03/12/18  0620 03/12/18  0220 03/11/18  0620  03/07/18  0515 03/06/18  0857   WBC 7.6  --  9.8 12.5*  < > 7.7  --    HGB 8.9*  --  10.8* 9.9*  < > 12.4*  --    MCV 99  --  99 99  < > 105*  --      --  183 149*  < > 131*  --    INR  --  1.16*  --   --   --  1.23*  --      --  144 140  < > 149*  --    POTASSIUM 4.4  --  4.0 4.2  < > 4.1  --    CHLORIDE 108  --  113* 108  < > 115*  --    CO2 29  --  27 27  < > 32  --    BUN 28  --  26 27  < > 27  --    CR 0.90  --  0.92 0.97  < > 1.25  --    ANIONGAP 3  --  4 5  < > 2*  --    TOBI 7.6*  --  7.8* 7.6*  < > 7.5*  --    *  --  110* 114*  < > 156*  --    ALBUMIN  --   --  1.8*  --   --  2.1*  --    PROTTOTAL  --   --  5.6*  --   --  5.3*  --    BILITOTAL  --   --  0.7  --   --  1.0  --    ALKPHOS  --   --  61  --   --  51  --    ALT  --   --  22  --   --  12  --    AST  --   --  28  --   --  18  --    TROPI  --   --   --   --   --  0.024 <0.015   < > = values in this interval not displayed.  Recent Results (from the past 24 hour(s))   XR Abdomen Port 1 View    Narrative    ABDOMEN PORTABLE ONE VIEW 3/12/2018 3:05 PM     COMPARISON: Frontal view of the abdomen 3/12/2018.    HISTORY: Orogastric tube placement.       Impression    IMPRESSION: There has been interval placement of orogastric tube with  its tip and sidehole in the stomach. Multiple dilated loops of small  bowel again noted. There is no evidence for free intraperitoneal air.    LAUREN ABEL MD

## 2018-03-13 NOTE — PLAN OF CARE
Problem: Patient Care Overview  Goal: Plan of Care/Patient Progress Review  Outcome: No Change  Pt tolerated CPAP 40% 5/5 for 90 minutes then pt became tacypneic and restless when sedation lightened. Pt attempted to open eyes and LUTHER's but did not follow any other commands. Pt up to chair for 3 hours. OGT with minimal output. Bowel sounds not audible. Wife and daughter at bedside -updated on plan of care. Cont to monitor.

## 2018-03-14 ENCOUNTER — APPOINTMENT (OUTPATIENT)
Dept: GENERAL RADIOLOGY | Facility: CLINIC | Age: 83
DRG: 329 | End: 2018-03-14
Attending: PHYSICIAN ASSISTANT
Payer: COMMERCIAL

## 2018-03-14 LAB
ANION GAP SERPL CALCULATED.3IONS-SCNC: 4 MMOL/L (ref 3–14)
BUN SERPL-MCNC: 29 MG/DL (ref 7–30)
CALCIUM SERPL-MCNC: 7.7 MG/DL (ref 8.5–10.1)
CHLORIDE SERPL-SCNC: 107 MMOL/L (ref 94–109)
CO2 SERPL-SCNC: 29 MMOL/L (ref 20–32)
CREAT SERPL-MCNC: 0.83 MG/DL (ref 0.66–1.25)
ERYTHROCYTE [DISTWIDTH] IN BLOOD BY AUTOMATED COUNT: 13.3 % (ref 10–15)
GFR SERPL CREATININE-BSD FRML MDRD: 87 ML/MIN/1.7M2
GLUCOSE BLDC GLUCOMTR-MCNC: 110 MG/DL (ref 70–99)
GLUCOSE BLDC GLUCOMTR-MCNC: 112 MG/DL (ref 70–99)
GLUCOSE SERPL-MCNC: 117 MG/DL (ref 70–99)
HCT VFR BLD AUTO: 36.2 % (ref 40–53)
HGB BLD-MCNC: 11.6 G/DL (ref 13.3–17.7)
LMWH PPP CHRO-ACNC: 0.27 IU/ML
MAGNESIUM SERPL-MCNC: 2.4 MG/DL (ref 1.6–2.3)
MCH RBC QN AUTO: 32 PG (ref 26.5–33)
MCHC RBC AUTO-ENTMCNC: 32 G/DL (ref 31.5–36.5)
MCV RBC AUTO: 100 FL (ref 78–100)
PLATELET # BLD AUTO: 202 10E9/L (ref 150–450)
POTASSIUM SERPL-SCNC: 4.4 MMOL/L (ref 3.4–5.3)
RBC # BLD AUTO: 3.63 10E12/L (ref 4.4–5.9)
SODIUM SERPL-SCNC: 140 MMOL/L (ref 133–144)
WBC # BLD AUTO: 4.9 10E9/L (ref 4–11)

## 2018-03-14 PROCEDURE — 40000239 ZZH STATISTIC VAT ROUNDS

## 2018-03-14 PROCEDURE — G0463 HOSPITAL OUTPT CLINIC VISIT: HCPCS

## 2018-03-14 PROCEDURE — 00000146 ZZHCL STATISTIC GLUCOSE BY METER IP

## 2018-03-14 PROCEDURE — 99291 CRITICAL CARE FIRST HOUR: CPT | Performed by: PHYSICIAN ASSISTANT

## 2018-03-14 PROCEDURE — 25000125 ZZHC RX 250: Performed by: PHYSICIAN ASSISTANT

## 2018-03-14 PROCEDURE — 71045 X-RAY EXAM CHEST 1 VIEW: CPT

## 2018-03-14 PROCEDURE — 80048 BASIC METABOLIC PNL TOTAL CA: CPT | Performed by: HOSPITALIST

## 2018-03-14 PROCEDURE — 25000125 ZZHC RX 250

## 2018-03-14 PROCEDURE — 25000125 ZZHC RX 250: Performed by: HOSPITALIST

## 2018-03-14 PROCEDURE — 40000008 ZZH STATISTIC AIRWAY CARE

## 2018-03-14 PROCEDURE — 20000003 ZZH R&B ICU

## 2018-03-14 PROCEDURE — 25000128 H RX IP 250 OP 636: Performed by: PHYSICIAN ASSISTANT

## 2018-03-14 PROCEDURE — 25000132 ZZH RX MED GY IP 250 OP 250 PS 637: Performed by: INTERNAL MEDICINE

## 2018-03-14 PROCEDURE — 40000275 ZZH STATISTIC RCP TIME EA 10 MIN

## 2018-03-14 PROCEDURE — 94640 AIRWAY INHALATION TREATMENT: CPT | Mod: 76

## 2018-03-14 PROCEDURE — 25000128 H RX IP 250 OP 636: Performed by: INTERNAL MEDICINE

## 2018-03-14 PROCEDURE — 94640 AIRWAY INHALATION TREATMENT: CPT

## 2018-03-14 PROCEDURE — 85027 COMPLETE CBC AUTOMATED: CPT | Performed by: HOSPITALIST

## 2018-03-14 PROCEDURE — 94003 VENT MGMT INPAT SUBQ DAY: CPT

## 2018-03-14 PROCEDURE — 25000128 H RX IP 250 OP 636: Performed by: HOSPITALIST

## 2018-03-14 PROCEDURE — 83735 ASSAY OF MAGNESIUM: CPT | Performed by: HOSPITALIST

## 2018-03-14 PROCEDURE — 85520 HEPARIN ASSAY: CPT | Performed by: HOSPITALIST

## 2018-03-14 RX ADMIN — IPRATROPIUM BROMIDE AND ALBUTEROL SULFATE 3 ML: .5; 3 SOLUTION RESPIRATORY (INHALATION) at 07:07

## 2018-03-14 RX ADMIN — IPRATROPIUM BROMIDE AND ALBUTEROL SULFATE 3 ML: .5; 3 SOLUTION RESPIRATORY (INHALATION) at 11:18

## 2018-03-14 RX ADMIN — HEPARIN SODIUM 1500 UNITS/HR: 10000 INJECTION, SOLUTION INTRAVENOUS at 21:59

## 2018-03-14 RX ADMIN — I.V. FAT EMULSION 250 ML: 20 EMULSION INTRAVENOUS at 08:05

## 2018-03-14 RX ADMIN — FAMOTIDINE 20 MG: 10 INJECTION, SOLUTION INTRAVENOUS at 19:39

## 2018-03-14 RX ADMIN — PROPOFOL 30 MCG/KG/MIN: 10 INJECTION, EMULSION INTRAVENOUS at 14:58

## 2018-03-14 RX ADMIN — HALOPERIDOL LACTATE 2 MG: 5 INJECTION, SOLUTION INTRAMUSCULAR at 09:43

## 2018-03-14 RX ADMIN — IPRATROPIUM BROMIDE AND ALBUTEROL SULFATE 3 ML: .5; 3 SOLUTION RESPIRATORY (INHALATION) at 20:34

## 2018-03-14 RX ADMIN — CHLORHEXIDINE GLUCONATE 15 ML: 1.2 RINSE ORAL at 19:38

## 2018-03-14 RX ADMIN — PROPOFOL 30 MCG/KG/MIN: 10 INJECTION, EMULSION INTRAVENOUS at 01:17

## 2018-03-14 RX ADMIN — CHLORHEXIDINE GLUCONATE 15 ML: 1.2 RINSE ORAL at 07:43

## 2018-03-14 RX ADMIN — Medication 0.12 MG/MIN: at 15:05

## 2018-03-14 RX ADMIN — ASCORBIC ACID, VITAMIN A PALMITATE, CHOLECALCIFEROL, THIAMINE HYDROCHLORIDE, RIBOFLAVIN-5 PHOSPHATE SODIUM, PYRIDOXINE HYDROCHLORIDE, NIACINAMIDE, DEXPANTHENOL, ALPHA-TOCOPHEROL ACETATE, VITAMIN K1, FOLIC ACID, BIOTIN, CYANOCOBALAMIN: 200; 3300; 200; 6; 3.6; 6; 40; 15; 10; 150; 600; 60; 5 INJECTION, SOLUTION INTRAVENOUS at 22:00

## 2018-03-14 RX ADMIN — PROPOFOL 30 MCG/KG/MIN: 10 INJECTION, EMULSION INTRAVENOUS at 06:33

## 2018-03-14 RX ADMIN — HEPARIN SODIUM 1500 UNITS/HR: 10000 INJECTION, SOLUTION INTRAVENOUS at 03:56

## 2018-03-14 RX ADMIN — HYDROMORPHONE HYDROCHLORIDE 0.2 MG: 1 INJECTION, SOLUTION INTRAMUSCULAR; INTRAVENOUS; SUBCUTANEOUS at 10:12

## 2018-03-14 RX ADMIN — PROPOFOL 30 MCG/KG/MIN: 10 INJECTION, EMULSION INTRAVENOUS at 21:58

## 2018-03-14 RX ADMIN — IPRATROPIUM BROMIDE AND ALBUTEROL SULFATE 3 ML: .5; 3 SOLUTION RESPIRATORY (INHALATION) at 15:41

## 2018-03-14 RX ADMIN — FAMOTIDINE 20 MG: 10 INJECTION, SOLUTION INTRAVENOUS at 08:05

## 2018-03-14 NOTE — PROGRESS NOTES
[facility] ICU RESPIRATORY NOTE  Date of Admission: 2/27/2108  Date of Intubation (most recent): 3/12/2018 (Re intubated)  Reason for Mechanical Ventilation: Acute hypercarbic respiratory failure   Number of Days on Mechanical Ventilation: 2  Met Criteria for Pressure Support Trial: Yes  Length of Pressure Support Trial: PS 5/5 40% about 90 minutes  Reason for Stopping Pressure Support Trial: Tachypnea       Significant Events Today: FiO2 weaned to 40%  Ventilation Mode: CMV/AC  (Continuous Mandatory Ventilation/ Assist Control)  FiO2 (%): 40 %  Rate Set (breaths/minute): 16 breaths/min  Tidal Volume Set (mL): 450 mL  PEEP (cm H2O): 5 cmH2O  Pressure Support (cm H2O): 5 cmH2O  Oxygen Concentration (%): 40 %  Resp: 29    ABG Results: No ABG result for today    ETT appearance on chest x-ray: ET tube in good position    Plan:  Continue full mechanical ventilatory support overnight

## 2018-03-14 NOTE — PLAN OF CARE
Problem: Patient Care Overview  Goal: Plan of Care/Patient Progress Review  Outcome: Improving  Pt following commands and moving x4 extremities, however very [place]. Sedated on propofol. Appears comfortable in bed. Full vent support overnight. LS coarse w/ moderate creamy seretions. TPN infusing. Abdomen soft but tender. Active bowel sounds and OG with 100cc green output. Segal w/ good urine output. Multiple skin tears w/ steri strips and foam dressingings. Frequent repositioning and moisturizer. Upper lip sore, Vaseline applied frequently.

## 2018-03-14 NOTE — PROGRESS NOTES
[facility]   WO Nurse Inpatient Skin Assessment     Follow up Assessment of:  Lt upper lip        Data:   Patient History:      per MD note(s):  86 yo male hx of GERD, barretts esophagus and skin cancer admitted  with small bowel obstruction. Initially managed conservatively however ultimately required operative investigation 3/4 where he underwent lysis of adhesions and partial small bowel resection.  His post op course was complicated by respiratory distress, agitation and SVT, atrial flutter for which he started on amiodarone via EP.  Additional imaging post op suggested continued bowel obstruction. RRT called yesterday for respiratory distress with CT Chest obtained 3/6 for respiratory distress negative for PE but mild emphysema , basilar fibrosis and possible pneumonia for he was started on zosyn.  TPN started 3/6      Earlier the evening of 3/6 nursing noted patient increased coarse breath sounds and desat, nasal trumpet placed for NT suctioned with transient improvement.  This AM was minimally arousable with desaturation not responsive to suctioning.  RRT called with patient found to be obstructing airway thus anesthesia called and patient intubated and transferred to the ICU.      On arrival to ICU patient sedate, hemodynamically appropriate, though not following commands.  VBG pre intubation demonstrated respiratory acidosis. NG output dark bilious - unchanged from prior per floor nurse.     Damian Assessment and sub scores:   Admian Score  Av.4  Min: 10  Max: 19     Positioning: Pillows,     Mattress:  Standard , Atmos Air mattress      Moisture Management:  Urinary Catheter    Catheter secured? Yes      Current Diet / Nutrition:     Active Diet Order      NPO for Medical/Clinical Reasons Except for: Meds      Labs:   Recent Labs   Lab Test  18   0429   18   0515   ALBUMIN   --    --   2.1*   HGB  10.6*   < >  12.4*   INR   --    --   1.23*   WBC  7.9   < >  7.7    <  > = values in this interval not displayed.           Skin Assessment (location): Lt upper lip  History:  Frail, fragile skin. Pt currently intubated.  Per nursing and RT they had retaped the head gear for the ET tube last night.  However the foam covered plastic stabilizer in the center of the ET tube head gear is pressing onto lips, directly on top of current wound.  The adhesive on the cheeks are sliding down the cheek bones.  Pt tilting his head up in an attempt to get away from the pinching of his lips.  Discussed need to re do the ET tube head gear, causing too much pressure on lips and face.  Wound on left, upper lip, just off midline is now moist, red, tissue, slightly crusted with blood at the margins.  Wound measures about 0.7cm x 0.3cm x 0.1cm+   . Pt was intubated on 3-7 and WOC consult placed early on 3-9.           Intervention:     Patient's chart evaluated.      Assessed: area as noted above    Orders  reviewed  Supplies  discussed with RN    Discussed plan of care with Nurse and RT          Assessment:       Lt upper lip:  Wound now moist and a little bloody looking with small amount of old blood crusted at margins.   Heavy ET tube head gear resting on wound site, pinching tissue, discussed with nursing and RT that tubing needs to be repositioned.  Wound clean, apply vaseline TID and prn        Plan:   Nursing to notify the Provider(s) and re-consult the WOC Nurse if skin deteriorate(s).  WOC Nurse will return: no need to continue to follow        Lt upper lip:      1. Moisturize TID with vaseline      2  DO NOT  positioning ET tube over area (move mid and Rt only)         Order updated to reflect above       Will follow up 2x/ week

## 2018-03-14 NOTE — PLAN OF CARE
Problem: Patient Care Overview  Goal: Plan of Care/Patient Progress Review  OT and PT: Per conversation with nursing, patient not able to participate in therapy at this time. Nursing assisting patient to chair.

## 2018-03-14 NOTE — PLAN OF CARE
Problem: Patient Care Overview  Goal: Plan of Care/Patient Progress Review  Weaned sedation down today, able to follow simple commands. Weaned to PS trial but did not tolerate well RR 35 despite giving haldol and remaining on low dose propofol. OOB to chair. OGT to LIWS with minimal bilious ouput, +BM today, notified surgery, no new orders. TPN continues. Remains on Amio and Heparin gtts. Updated family via phone today of plan of care.

## 2018-03-14 NOTE — PROGRESS NOTES
[facility] ICU RESPIRATORY NOTE  Date of Admission: 2/27/2018  Date of Intubation (most recent): 3/12/2018 (Re intubated)  Reason for Mechanical Ventilation: Acute hypercarbic respiratory failure   Number of Days on Mechanical Ventilation: 3  Met Criteria for Pressure Support Trial: Yes  Length of Pressure Support Trial: PS 5/5 40% about 1 hour  Reason for Stopping Pressure Support Trial: Tachypnea    Significant Events Today: None  Ventilation Mode: CMV/AC  (Continuous Mandatory Ventilation/ Assist Control)  FiO2 (%): 40 %  Rate Set (breaths/minute): 16 breaths/min  Tidal Volume Set (mL): 450 mL  PEEP (cm H2O): 5 cmH2O  Pressure Support (cm H2O): 5 cmH2O  Oxygen Concentration (%): 40 %  Resp: 27    ABG Results: No ABG result for today    ETT appearance on chest x-ray: ET tube in good position    Plan:  Continue full mechanical ventilatory support overnight.

## 2018-03-14 NOTE — PROGRESS NOTES
[facility] ICU RESPIRATORY NOTE  Date of Admission: 2/27/2108  Date of Intubation (most recent): 3/12/2018 (Re intubated)  Reason for Mechanical Ventilation: Acute hypercarbic respiratory failure   Number of Days on Mechanical Ventilation: 3  Met Criteria for Pressure Support Trial: Yes  Length of Pressure Support Trial: PS 5/5 40% about 90 minutes  Reason for Stopping Pressure Support Trial: Tachypnea       Recent Labs  Lab 03/07/18 2012 03/07/18  1118 03/07/18  0800   PH 7.49* 7.50* Canceled, Test credited   PCO2 39 37 Canceled, Test credited   PO2 149* 172* Canceled, Test credited   HCO3 29* 29* Canceled, Test credited   O2PER  --  50% Canceled, Test credited     Ventilation Mode: CMV/AC  (Continuous Mandatory Ventilation/ Assist Control)  FiO2 (%): 40 %  Rate Set (breaths/minute): 16 breaths/min  Tidal Volume Set (mL): 450 mL  PEEP (cm H2O): 5 cmH2O  Pressure Support (cm H2O): 5 cmH2O  Oxygen Concentration (%): 40 %  Resp: 22    Will continue to assess daily for wean.     James Love RT

## 2018-03-14 NOTE — PROGRESS NOTES
[facility]    Critical Care Service  Progress Note    Date of Service (when I saw the patient): 03/14/2018      Problem List   SBO s/p ex lap, TISHA and partial small bowel resection 3/4  A-fib with RVR  Acute hypercarbic respiratory failure s/p re-intubation  ?Aspiration PNA  Protein calorie malnutrition, on TPN  Pulmonary fibrosis       Main Plans for Today  Sedation holiday  PST  OOB      Assessment & Plan   Joseluis Falcon is a 85 male initially admitted for SBO on 2/27, initially managed non-operatively now s/p lysis of adhesions and partial small bowel resection on 3/4. Decompensated with encephalopathy and respiratory failure night of 3/6, intubated and transferred to ICU. Extubated 3/11 and re-intubated 3/12 after increased WOB. Ongoing issues with prolonged ileus.       Neurology/Psychiatry:   1. Encephalopathy/agitation  2. Analgesia/Anxiety  Plan  - propofol gtt, goal RASS  0, wean as able and assess neuro status   -- sedation vacation during PST today   - prn dilaudid        Cardiovascular:   1.Hemodynamics -currently appropriate   2.Rhythm - SR, previous atrial fibrillation with RVR  Plan  - follow hemodynamics, goal MAP >65, no pressors   - now back in sinus rhythm.  Amio drip 0.25/min and transitioned to 0.125/min per EP note   - continue heparin gtt for afib, will transition to coumadin once more stable and able to take PO      Pulmonary/Ventilator Management:   1. Acute hypercarbic respiratory failure -first respiratory failure in setting of recent surgery, likely aspiration with evidence of REJI physiology on initial eval. Extubated 3/11 and re-intubated 3/12 after increased WOB and failed BiPAP trial.   2. ?Aspiration pneumonia  3. Mild emphysema/fibrosis - ? Age related   Plan  - Extubated 3/11, re-intubated overnight 3/12. Increased WOB and poor air movement. CXR appeared minimally changed.   - pulmonary toilet   - scheduled duonebs   - PST today with goal of extubation       GI and  Nutrition :   1. Small bowel obstruction s/p ex lap, TISHA and partial small bowel resection 3/4 - with subsequent imaging suggestive of continued obstruction vs ileus   2. Ileus   3. Severe protein calorie malnutrition  4. GERD/Carlson's   Plan  - Replaced NG tube, low intermittent suction; minimal output daily   - H2 blocker   - TPN, continue for now per surgery   - continued dilated loops of bowel per AXR, no BMs and no bowel sounds. Surgery feels that this is consistent with prolonged ileus  - abd exam is benign       Renal/Fluids/Electrolytes:   1. ?Mild BJ, slight elevation in creatinine --- resolved  2. Electrolyte abnormality - hypernatremia resolved  3. Acid/base status - resp alkalosis-improved  4. Volume status - Goal even.  Plan  - maintain hemodynamics   - monitor function and electrolytes as needed with replacement per ICU protocols.  - generally avoid nephrotoxic agents such as NSAID, IV contrast unless specifically required  - adjust medications as needed for renal clearance  - follow I/O's as appropriate      Infectious Disease:   1. ?Aspiration pneumonia/sepsis  Plan  - Sputum NGTD, procalcitonin negative    - no WBC and afebrile   - zosyn course for suspected aspiration PNA, completed 7 days course   - no abx currently       Endocrine:   1. Stress induced hyperglycemia  Plan  - ICU insulin protocol, goal sugar <180  - SSI      Hematology/Oncology:   1. Normocytic anemia  2. Thrombocytopenia, resolved   Plan  - Trend Hgb  - will recheck Hgb this AM      IV/Access:   1. Venous access - PICC   2. Arterial access - none  Plan  - central access required and necessary      ICU Prophylaxis:   1. DVT: Hep gtt   2. VAP: HOB 30 degrees, chlorhexidine rinse  3. Stress Ulcer: H2 blocker  4. Restraints: Nonviolent soft two point restraints required and necessary for patient safety and continued cares and good effect as patient continues to pull at necessary lines, tubes despite education and distraction. Will  readdress daily.   5. Wound care - per unit routine   6. Feeding - TPN   7. Family Update: pending   8. Disposition - ICU    Tierra Ling    Time Spent on this Encounter   Billing:  I spent 45 minutes bedside and on the inpatient unit today managing the critical care of Joseluis Falcon in relation to the issues listed in this note.    Interval History   Course reviewed. No acute events overnight. Patient remains intubated receiving TPN. Still no return of bowel function.    Physical Exam   Temp: 98.5  F (36.9  C) Temp src: Axillary Temp  Min: 98  F (36.7  C)  Max: 98.6  F (37  C) BP: 133/62   Heart Rate: 66 Resp: (!) 46 SpO2: 99 % O2 Device: Mechanical Ventilator    Vitals:    03/12/18 0630 03/13/18 0430 03/14/18 0400   Weight: 64.9 kg (143 lb 1.3 oz) 65.8 kg (145 lb 1 oz) 65.4 kg (144 lb 2.9 oz)     I/O last 3 completed shifts:  In: 3002.33 [I.V.:842.33]  Out: 4075 [Urine:3825; Emesis/NG output:250]    GEN: Cachectic male, resting in bed, sedated on exam   EYES: PERRL, Anicteric sclera.   HEENT:  Normocephalic, atraumatic, trachea midline, ETT secure  CV: RRR, no murmurs  PULM/CHEST: Clear breath sounds bilaterally, no wheeze  GI: no bowel sounds, soft, non-tender, laparoscopic abdominal incisions covered  : alba catheter in place, urine yellow and clear  EXTREMITIES: no peripheral edema  NEURO: sedated on exam, does open eyes to voice, not following commands this AM  SKIN: warm and dry   Imaging personally reviewed: no new imaging   ECG- SR    Medications     propofol (DIPRIVAN) infusion 10 mcg/kg/min (03/14/18 0900)     parenteral nutrition - Clinimix E 90 mL/hr at 03/13/18 8011     IV fluid REPLACEMENT ONLY       amiodarone 0.125 mg/min (03/13/18 1943)     HEParin 1,500 Units/hr (03/14/18 2846)       ipratropium - albuterol 0.5 mg/2.5 mg/3 mL  3 mL Nebulization 4x daily     famotidine  20 mg Intravenous Q12H     insulin aspart  1-6 Units Subcutaneous Q4H     chlorhexidine  15 mL Mouth/Throat Q12H     sodium  chloride (PF)  3 mL Intracatheter Q8H     sodium chloride (PF)  10 mL Intracatheter Q8H     lipids  250 mL Intravenous Every Other Day       Data     Recent Labs  Lab 03/14/18  0405 03/13/18  1040 03/13/18  0535 03/12/18  0620 03/12/18  0220   WBC 4.9  --  7.6  --  9.8   HGB 11.6* 9.4* 8.9*  --  10.8*     --  99  --  99     --  219  --  183   INR  --   --   --  1.16*  --      --  140  --  144   POTASSIUM 4.4  --  4.4  --  4.0   CHLORIDE 107  --  108  --  113*   CO2 29  --  29  --  27   BUN 29  --  28  --  26   CR 0.83  --  0.90  --  0.92   ANIONGAP 4  --  3  --  4   TOBI 7.7*  --  7.6*  --  7.8*   *  --  121*  --  110*   ALBUMIN  --   --   --   --  1.8*   PROTTOTAL  --   --   --   --  5.6*   BILITOTAL  --   --   --   --  0.7   ALKPHOS  --   --   --   --  61   ALT  --   --   --   --  22   AST  --   --   --   --  28     Recent Results (from the past 24 hour(s))   XR Chest Port 1 View    Narrative    CHEST PORTABLE ONE VIEW March 14, 2018 5:51 AM     HISTORY: Assess infiltrates.     COMPARISON: 3/12/2018.      Impression    IMPRESSION:   1. Enteric tube has been advanced with tip now below the diaphragm off  the field-of-view. ET tube and right PICC remain in place.  2. Patchy airspace opacity at the lateral left lung base appear  slightly increased since prior. Background of interstitial opacities  are not significantly changed. No significant pleural effusion or  pneumothorax.    ILENE UREÑA MD

## 2018-03-14 NOTE — PROGRESS NOTES
Surgery    No issues. Minimal NGT output. No distention. No reported bowel function.    Abdomen-soft. Minimal distention.    A/P  No significant change. Remains soft without distention. NGT without high output. Cont ICU care and TPN.    Robel Gomez M.D.  [facility]  [phone]

## 2018-03-15 LAB
ANION GAP SERPL CALCULATED.3IONS-SCNC: 5 MMOL/L (ref 3–14)
BUN SERPL-MCNC: 29 MG/DL (ref 7–30)
CALCIUM SERPL-MCNC: 7.8 MG/DL (ref 8.5–10.1)
CHLORIDE SERPL-SCNC: 106 MMOL/L (ref 94–109)
CO2 SERPL-SCNC: 29 MMOL/L (ref 20–32)
CREAT SERPL-MCNC: 0.78 MG/DL (ref 0.66–1.25)
ERYTHROCYTE [DISTWIDTH] IN BLOOD BY AUTOMATED COUNT: 13.4 % (ref 10–15)
GFR SERPL CREATININE-BSD FRML MDRD: >90 ML/MIN/1.7M2
GLUCOSE SERPL-MCNC: 118 MG/DL (ref 70–99)
HCT VFR BLD AUTO: 31 % (ref 40–53)
HGB BLD-MCNC: 9.8 G/DL (ref 13.3–17.7)
LMWH PPP CHRO-ACNC: 0.34 IU/ML
MAGNESIUM SERPL-MCNC: 2.4 MG/DL (ref 1.6–2.3)
MCH RBC QN AUTO: 31.7 PG (ref 26.5–33)
MCHC RBC AUTO-ENTMCNC: 31.6 G/DL (ref 31.5–36.5)
MCV RBC AUTO: 100 FL (ref 78–100)
PLATELET # BLD AUTO: 295 10E9/L (ref 150–450)
POTASSIUM SERPL-SCNC: 4.5 MMOL/L (ref 3.4–5.3)
RBC # BLD AUTO: 3.09 10E12/L (ref 4.4–5.9)
SODIUM SERPL-SCNC: 140 MMOL/L (ref 133–144)
WBC # BLD AUTO: 6.8 10E9/L (ref 4–11)

## 2018-03-15 PROCEDURE — 25000128 H RX IP 250 OP 636: Performed by: PHYSICIAN ASSISTANT

## 2018-03-15 PROCEDURE — 20000003 ZZH R&B ICU

## 2018-03-15 PROCEDURE — 25000125 ZZHC RX 250: Performed by: HOSPITALIST

## 2018-03-15 PROCEDURE — 94003 VENT MGMT INPAT SUBQ DAY: CPT

## 2018-03-15 PROCEDURE — 40000008 ZZH STATISTIC AIRWAY CARE

## 2018-03-15 PROCEDURE — 25000125 ZZHC RX 250: Performed by: INTERNAL MEDICINE

## 2018-03-15 PROCEDURE — 99291 CRITICAL CARE FIRST HOUR: CPT | Performed by: PHYSICIAN ASSISTANT

## 2018-03-15 PROCEDURE — 85520 HEPARIN ASSAY: CPT | Performed by: HOSPITALIST

## 2018-03-15 PROCEDURE — 25000128 H RX IP 250 OP 636: Performed by: ANESTHESIOLOGY

## 2018-03-15 PROCEDURE — 80048 BASIC METABOLIC PNL TOTAL CA: CPT | Performed by: HOSPITALIST

## 2018-03-15 PROCEDURE — 94640 AIRWAY INHALATION TREATMENT: CPT | Mod: 76

## 2018-03-15 PROCEDURE — 40000275 ZZH STATISTIC RCP TIME EA 10 MIN

## 2018-03-15 PROCEDURE — 40000239 ZZH STATISTIC VAT ROUNDS

## 2018-03-15 PROCEDURE — 25000128 H RX IP 250 OP 636: Performed by: HOSPITALIST

## 2018-03-15 PROCEDURE — 83735 ASSAY OF MAGNESIUM: CPT | Performed by: HOSPITALIST

## 2018-03-15 PROCEDURE — 85027 COMPLETE CBC AUTOMATED: CPT | Performed by: HOSPITALIST

## 2018-03-15 PROCEDURE — 25000125 ZZHC RX 250

## 2018-03-15 PROCEDURE — 25000125 ZZHC RX 250: Performed by: PHYSICIAN ASSISTANT

## 2018-03-15 PROCEDURE — 25000128 H RX IP 250 OP 636: Performed by: INTERNAL MEDICINE

## 2018-03-15 RX ORDER — METOPROLOL TARTRATE 1 MG/ML
2.5 INJECTION, SOLUTION INTRAVENOUS ONCE
Status: COMPLETED | OUTPATIENT
Start: 2018-03-15 | End: 2018-03-15

## 2018-03-15 RX ADMIN — FAMOTIDINE 20 MG: 10 INJECTION, SOLUTION INTRAVENOUS at 19:40

## 2018-03-15 RX ADMIN — PROPOFOL 30 MCG/KG/MIN: 10 INJECTION, EMULSION INTRAVENOUS at 18:12

## 2018-03-15 RX ADMIN — CHLORHEXIDINE GLUCONATE 15 ML: 1.2 RINSE ORAL at 08:12

## 2018-03-15 RX ADMIN — IPRATROPIUM BROMIDE AND ALBUTEROL SULFATE 3 ML: .5; 3 SOLUTION RESPIRATORY (INHALATION) at 19:15

## 2018-03-15 RX ADMIN — HYDROMORPHONE HYDROCHLORIDE 0.2 MG: 1 INJECTION, SOLUTION INTRAMUSCULAR; INTRAVENOUS; SUBCUTANEOUS at 18:12

## 2018-03-15 RX ADMIN — CHLORHEXIDINE GLUCONATE 15 ML: 1.2 RINSE ORAL at 19:40

## 2018-03-15 RX ADMIN — PROPOFOL 30 MCG/KG/MIN: 10 INJECTION, EMULSION INTRAVENOUS at 13:55

## 2018-03-15 RX ADMIN — FAMOTIDINE 20 MG: 10 INJECTION, SOLUTION INTRAVENOUS at 08:12

## 2018-03-15 RX ADMIN — HYDROMORPHONE HYDROCHLORIDE 0.2 MG: 1 INJECTION, SOLUTION INTRAMUSCULAR; INTRAVENOUS; SUBCUTANEOUS at 19:40

## 2018-03-15 RX ADMIN — IPRATROPIUM BROMIDE AND ALBUTEROL SULFATE 3 ML: .5; 3 SOLUTION RESPIRATORY (INHALATION) at 11:23

## 2018-03-15 RX ADMIN — HEPARIN SODIUM 1500 UNITS/HR: 10000 INJECTION, SOLUTION INTRAVENOUS at 13:56

## 2018-03-15 RX ADMIN — HALOPERIDOL LACTATE 2 MG: 5 INJECTION, SOLUTION INTRAMUSCULAR at 09:33

## 2018-03-15 RX ADMIN — IPRATROPIUM BROMIDE AND ALBUTEROL SULFATE 3 ML: .5; 3 SOLUTION RESPIRATORY (INHALATION) at 07:25

## 2018-03-15 RX ADMIN — IPRATROPIUM BROMIDE AND ALBUTEROL SULFATE 3 ML: .5; 3 SOLUTION RESPIRATORY (INHALATION) at 15:16

## 2018-03-15 RX ADMIN — Medication 0.5 MG/MIN: at 21:42

## 2018-03-15 RX ADMIN — METOPROLOL TARTRATE 2.5 MG: 5 INJECTION, SOLUTION INTRAVENOUS at 19:59

## 2018-03-15 RX ADMIN — ASCORBIC ACID, VITAMIN A PALMITATE, CHOLECALCIFEROL, THIAMINE HYDROCHLORIDE, RIBOFLAVIN-5 PHOSPHATE SODIUM, PYRIDOXINE HYDROCHLORIDE, NIACINAMIDE, DEXPANTHENOL, ALPHA-TOCOPHEROL ACETATE, VITAMIN K1, FOLIC ACID, BIOTIN, CYANOCOBALAMIN: 200; 3300; 200; 6; 3.6; 6; 40; 15; 10; 150; 600; 60; 5 INJECTION, SOLUTION INTRAVENOUS at 20:37

## 2018-03-15 RX ADMIN — PROPOFOL 30 MCG/KG/MIN: 10 INJECTION, EMULSION INTRAVENOUS at 06:25

## 2018-03-15 NOTE — PROGRESS NOTES
"Surgery    Patient in ICU, intubated, sedated.   On TPN  Blood pressure 155/83, pulse 145, temperature 99.5  F (37.5  C), temperature source Axillary, resp. rate 26, height 1.753 m (5' 9\"), weight 64.9 kg (143 lb 1.3 oz), SpO2 99 %.  Abd soft, no reaction/response with palpation.  NG with 200-300cc out daily.   BM charted yesterday morning.   No additional surgical recs.   Following.     Bryan Griffin PA-C  Office: [phone]  Pager: [phone]    "

## 2018-03-15 NOTE — PLAN OF CARE
Problem: Patient Care Overview  Goal: Plan of Care/Patient Progress Review  Outcome: Improving  Pt sedated on propofol w/ full vent support. Following commands, very heard of hearing. Moderate amount of creamy section in ETT. Abdomen tender, non distended. Active bowel sounds. Smear of stool. OG with 150cc brown/bile output. Continues on TPN. Segal w/ good amounts of urine. Skin moisturized w/ multiple skin tears. Plan to continue to wean and promote bowel function.

## 2018-03-15 NOTE — PROGRESS NOTES
CLINICAL NUTRITION SERVICES - REASSESSMENT NOTE      Future/Additional Recommendations: If trickle TF desired, recommend Promote with Fiber at 10 mL/hr to provide:  240 kcal, 15 g protein, 33 g CHO, 3 g fiber, 199 mL H2O  Total (TPN/Lipid + TF + Propofol)= 2338 kcal (37 kcal/kg and 105% needs), 123 g protein (1.9 g/kg)    Eventual TF goal would be Promote with Fiber at 80 mL/hr to provide:  1920 kcal (30 kcal/kg), 120 g protein (1.9 g/kg), 265 g CHO, 27 g fiber, 1594 mL H2O  Total with Propofol = 2234 kcal (35 kcal/kg)   Malnutrition: (3/6)  % Weight Loss:  Unable to determine without recent history  % Intake:  </= 50% for >/= 5 days (severe malnutrition)  Subcutaneous Fat Loss:  Orbital region moderate depletion and Upper arm region moderate depletion  Muscle Loss:  Temporal region severe depletion and Clavicle bone region severe depletion  Fluid Retention:  None noted      Malnutrition Diagnosis: Severe malnutrition  In Context of:  Acute illness or injury  Environmental or social circumstances        EVALUATION OF PROGRESS TOWARD GOALS   Diet:  NPO on vent   Nutrition Support:  Patient continues on goal TPN regimen as follows ~    Nutrition Support Parenteral:  Type of Access: PICC  Parenteral Frequency:  Continuous  Parenteral Regimen: D15 AA5 at 90 mL/hr + Lipid 250 mL every 48 hours   Total Parenteral Provisions: 1784 kcal, 108 g protein (1.6 g/kg), 324 g CHO, 25% fat   Patient also receiving a low dose of Propofol at 11.9 mL/hr= 314 kcal   Total (TPN/Lipid + Propofol)= 2098 kcal (33 kcal/kg)    Intake/Tolerance:    Labs noted and acceptable (Mg slightly elevated at 2.4)  BGMs < 150 -- Med SSI   BM x 1 yesterday,  mL out -- Noted possible d/c of NGT and trial of trickle feeds      Dosing Weight::  63.9 (3/8 wt)      ASSESSED NUTRITION NEEDS:  Energy needs: 2150-5164 kcals (30-35 kcal/kg) - malnourished, vented  Protein Needs:   grams protein (1.5-2.0 g pro/Kg) - Repletion and hypercatabolism with  acute illness, possible CRF      NEW FINDINGS:   3/14:  WOCN eval = Wound now moist and a little bloody looking with small amount of old blood crusted at margins     Previous Goals (3/12):   TPN/Lipids + Propofol will meet % estimated needs  Evaluation: Met    Previous Nutrition Diagnosis (3/12):   No nutrition diagnosis identified at this time   Evaluation: No change      CURRENT NUTRITION DIAGNOSIS  No nutrition diagnosis identified at this time     INTERVENTIONS  Recommendations / Nutrition Prescription  Continue TPN and Lipids as above    If trickle TF desired, recommend Promote with Fiber at 10 mL/hr to provide:  240 kcal, 15 g protein, 33 g CHO, 3 g fiber, 199 mL H2O  Total (TPN/Lipid + TF + Propofol)= 2338 kcal (37 kcal/kg and 105% needs), 123 g protein (1.9 g/kg)    Eventual TF goal would be Promote with Fiber at 80 mL/hr to provide:  1920 kcal (30 kcal/kg), 120 g protein (1.9 g/kg), 265 g CHO, 27 g fiber, 1594 mL H2O  Total with Propofol = 2234 kcal (35 kcal/kg)    Implementation  Collaboration and Referral of Nutrition care:  Patient discussed today during interdisciplinary bedside rounds.  Also discussed TPN with Pharmacist -- no changes to today's regimen.    Goals  Patient will transition from TPN to TF within the next 3-4 days     MONITORING AND EVALUATION:  Progress towards goals will be monitored and evaluated per protocol and Practice Guidelines    Shelby Amaya RD, LD, CNSC   Clinical Dietitian - [facility]

## 2018-03-15 NOTE — PROGRESS NOTES
[facility]    Critical Care Service  Progress Note    Date of Service (when I saw the patient): 03/15/2018     Problem List   SBO s/p ex lap, TISHA and partial small bowel resection 3/4  A-fib with RVR  Acute hypercarbic respiratory failure s/p re-intubation  ?Aspiration PNA  Protein calorie malnutrition, on TPN  Pulmonary fibrosis       Main Plans for Today  Sedation holiday  PST  OOB  ? Trickle feeds   Recheck Hgb      Assessment & Plan   Joseluis Falcon is a 85 male initially admitted for SBO on 2/27, initially managed non-operatively now s/p lysis of adhesions and partial small bowel resection on 3/4. Decompensated with encephalopathy and respiratory failure night of 3/6, intubated and transferred to ICU. Extubated 3/11 and re-intubated 3/12 after increased WOB. Ongoing issues with prolonged ileus and inability to wean from ventilator.       Neurology/Psychiatry:   1. Encephalopathy/agitation  2. Analgesia/Anxiety  Plan  - propofol gtt, goal RASS  0, wean as able and assess neuro status   - sedation vacation during PST today   - prn dilaudid        Cardiovascular:   1.Hemodynamics -currently appropriate   2.Rhythm - SR, previous atrial fibrillation with RVR  Plan  - follow hemodynamics, goal MAP >65, no pressors   - now back in sinus rhythm.  Amio drip 0.25/min and transitioned to 0.125/min per EP note   - continue heparin gtt for afib, will transition to coumadin once more stable and able to take PO      Pulmonary/Ventilator Management:   1. Acute hypercarbic respiratory failure -first respiratory failure in setting of recent surgery, likely aspiration with evidence of REJI physiology on initial eval. Extubated 3/11 and re-intubated 3/12 after increased WOB and failed BiPAP trial.   2. ?Aspiration pneumonia  3. Mild emphysema/fibrosis - ? Age related   Plan  - Extubated 3/11, re-intubated overnight 3/12. Increased WOB and poor air movement. CXR appeared minimally changed.   - pulmonary toilet   -  scheduled duonebs   - PST today with goal of extubation when able        GI and Nutrition :   1. Small bowel obstruction s/p ex lap, TISHA and partial small bowel resection 3/4 - with subsequent imaging suggestive of continued obstruction vs ileus   2. Ileus   3. Severe protein calorie malnutrition  4. GERD/Carlson's   Plan  - Replaced NG tube, low intermittent suction; minimal output daily   - H2 blocker   - TPN, continue for now per surgery   - question initiation of trickle feeds as return of bowel function yesterday   - abd exam is benign       Renal/Fluids/Electrolytes:   1. ?Mild BJ, slight elevation in creatinine --- resolved  2. Electrolyte abnormality - hypernatremia resolved  3. Acid/base status - resp alkalosis-improved  4. Volume status - Goal even.  Plan  - maintain hemodynamics   - monitor function and electrolytes as needed with replacement per ICU protocols.  - generally avoid nephrotoxic agents such as NSAID, IV contrast unless specifically required  - adjust medications as needed for renal clearance  - follow I/O's as appropriate      Infectious Disease:   1. ?Aspiration pneumonia/sepsis  Plan  - Sputum NGTD, procalcitonin negative    - no WBC and afebrile   - zosyn course for suspected aspiration PNA, completed 7 days course   - no abx currently       Endocrine:   1. Stress induced hyperglycemia  Plan  - ICU insulin protocol, goal sugar <180  - SSI      Hematology/Oncology:   1. Normocytic anemia  2. Thrombocytopenia, resolved   Plan  - Trend Hgb   - will recheck Hgb this AM given drop in Hgb. But no signs of active blood loss       IV/Access:   1. Venous access - PICC   2. Arterial access - none  Plan  - central access required and necessary      ICU Prophylaxis:   1. DVT: Hep gtt   2. VAP: HOB 30 degrees, chlorhexidine rinse  3. Stress Ulcer: H2 blocker  4. Restraints: Nonviolent soft two point restraints required and necessary for patient safety and continued cares and good effect as patient  continues to pull at necessary lines, tubes despite education and distraction. Will readdress daily.   5. Wound care - per unit routine   6. Feeding - TPN   7. Family Update: pending   8. Disposition - ICU     Tierra Ling    Time Spent on this Encounter   Billing:  I spent 45 minutes bedside and on the inpatient unit today managing the critical care of Joseluis Falcon in relation to the issues listed in this note.    Interval History   Course reviewed. No acute events overnight. Patient remained intubated. High RR with poor TV on PST yesterday.     Physical Exam   Temp: 99.5  F (37.5  C) Temp src: Axillary Temp  Min: 98  F (36.7  C)  Max: 99.5  F (37.5  C) BP: 155/83   Heart Rate: 70 Resp: 26 SpO2: 99 % O2 Device: Mechanical Ventilator    Vitals:    03/13/18 0430 03/14/18 0400 03/15/18 0000   Weight: 65.8 kg (145 lb 1 oz) 65.4 kg (144 lb 2.9 oz) 64.9 kg (143 lb 1.3 oz)     I/O last 3 completed shifts:  In: 3231.43 [I.V.:803.43]  Out: 3935 [Urine:3585; Emesis/NG output:350]    GEN: Cachectic male, resting in bed, sedated on exam   EYES: PERRL, Anicteric sclera.   HEENT:  Normocephalic, atraumatic, trachea midline, ETT secure  CV: RRR, no murmurs  PULM/CHEST: Clear breath sounds bilaterally, no wheeze  GI: no bowel sounds, soft, non-tender  : alba catheter in place, urine yellow and clear  EXTREMITIES: no peripheral edema  NEURO: does open eyes to voice, squeezes hands bilaterally   SKIN: warm and dry   Imaging personally reviewed: no new imaging   ECG- SR    Medications     propofol (DIPRIVAN) infusion 15 mcg/kg/min (03/15/18 0856)     parenteral nutrition - Clinimix E 90 mL/hr at 03/14/18 2200     IV fluid REPLACEMENT ONLY       amiodarone 0.125 mg/min (03/14/18 1937)     HEParin 1,500 Units/hr (03/14/18 2159)       ipratropium - albuterol 0.5 mg/2.5 mg/3 mL  3 mL Nebulization 4x daily     famotidine  20 mg Intravenous Q12H     insulin aspart  1-6 Units Subcutaneous Q4H     chlorhexidine  15 mL Mouth/Throat Q12H      sodium chloride (PF)  3 mL Intracatheter Q8H     sodium chloride (PF)  10 mL Intracatheter Q8H     lipids  250 mL Intravenous Every Other Day       Data     Recent Labs  Lab 03/15/18  0407 03/14/18  0405 03/13/18  1040 03/13/18  0535 03/12/18  0620 03/12/18  0220   WBC 6.8 4.9  --  7.6  --  9.8   HGB 9.8* 11.6* 9.4* 8.9*  --  10.8*    100  --  99  --  99    202  --  219  --  183   INR  --   --   --   --  1.16*  --     140  --  140  --  144   POTASSIUM 4.5 4.4  --  4.4  --  4.0   CHLORIDE 106 107  --  108  --  113*   CO2 29 29  --  29  --  27   BUN 29 29  --  28  --  26   CR 0.78 0.83  --  0.90  --  0.92   ANIONGAP 5 4  --  3  --  4   TOBI 7.8* 7.7*  --  7.6*  --  7.8*   * 117*  --  121*  --  110*   ALBUMIN  --   --   --   --   --  1.8*   PROTTOTAL  --   --   --   --   --  5.6*   BILITOTAL  --   --   --   --   --  0.7   ALKPHOS  --   --   --   --   --  61   ALT  --   --   --   --   --  22   AST  --   --   --   --   --  28     No results found for this or any previous visit (from the past 24 hour(s)).

## 2018-03-15 NOTE — PLAN OF CARE
Problem: Patient Care Overview  Goal: Plan of Care/Patient Progress Review  Outcome: No Change  Following commands. Continues on Propofol. OOB to chair. Attempted wean to PS but again RR 35 back on CMV, weaned sedation with PS trial. TPN continues, OGT with minimal bilious output. At 530pm patient went in and out of Afib, Dr. Crawford aware, titrated up Amio gtt. Updated wife and daughter via phone of patients care.

## 2018-03-15 NOTE — PROGRESS NOTES
[facility] ICU RESPIRATORY NOTE  Date of Admission:2/27/2018  Date of Intubation (most recent):3/12/2018 (reintubated)  Reason for Mechanical Ventilation : Acute hypercarbic respiratory failure   Number of Days on Mechanical Ventilation: 4  Met Criteria for Pressure Support Trial:Yes  Length of Pressure Support Trial: None  Reason for Stopping Pressure Support Trial:  Reason for No Pressure Support Trial: Per MD    Significant Events Today: None    ABG Results:No lab results found in last 7 days.      ETT appearance on chest x-ray:ET tube in good position    Plan: Continue full ventilatory support and assess for weaning the am.  3/15/2018  Josy Pizano

## 2018-03-15 NOTE — PLAN OF CARE
Problem: Patient Care Overview  Goal: Plan of Care/Patient Progress Review  OT and PT: Per discussion with nursing, patient not appropriate for session at this time.

## 2018-03-16 ENCOUNTER — APPOINTMENT (OUTPATIENT)
Dept: GENERAL RADIOLOGY | Facility: CLINIC | Age: 83
DRG: 329 | End: 2018-03-16
Attending: NURSE PRACTITIONER
Payer: COMMERCIAL

## 2018-03-16 ENCOUNTER — APPOINTMENT (OUTPATIENT)
Dept: OCCUPATIONAL THERAPY | Facility: CLINIC | Age: 83
DRG: 329 | End: 2018-03-16
Payer: COMMERCIAL

## 2018-03-16 LAB
ANION GAP SERPL CALCULATED.3IONS-SCNC: 3 MMOL/L (ref 3–14)
BUN SERPL-MCNC: 33 MG/DL (ref 7–30)
CALCIUM SERPL-MCNC: 7.9 MG/DL (ref 8.5–10.1)
CHLORIDE SERPL-SCNC: 106 MMOL/L (ref 94–109)
CO2 SERPL-SCNC: 30 MMOL/L (ref 20–32)
CREAT SERPL-MCNC: 0.79 MG/DL (ref 0.66–1.25)
ERYTHROCYTE [DISTWIDTH] IN BLOOD BY AUTOMATED COUNT: 13.5 % (ref 10–15)
GFR SERPL CREATININE-BSD FRML MDRD: >90 ML/MIN/1.7M2
GLUCOSE BLDC GLUCOMTR-MCNC: 105 MG/DL (ref 70–99)
GLUCOSE BLDC GLUCOMTR-MCNC: 117 MG/DL (ref 70–99)
GLUCOSE BLDC GLUCOMTR-MCNC: 135 MG/DL (ref 70–99)
GLUCOSE BLDC GLUCOMTR-MCNC: 136 MG/DL (ref 70–99)
GLUCOSE SERPL-MCNC: 118 MG/DL (ref 70–99)
HCT VFR BLD AUTO: 28.9 % (ref 40–53)
HGB BLD-MCNC: 9.1 G/DL (ref 13.3–17.7)
LMWH PPP CHRO-ACNC: 0.29 IU/ML
MAGNESIUM SERPL-MCNC: 2.3 MG/DL (ref 1.6–2.3)
MCH RBC QN AUTO: 31.7 PG (ref 26.5–33)
MCHC RBC AUTO-ENTMCNC: 31.5 G/DL (ref 31.5–36.5)
MCV RBC AUTO: 101 FL (ref 78–100)
PLATELET # BLD AUTO: 289 10E9/L (ref 150–450)
POTASSIUM SERPL-SCNC: 4.5 MMOL/L (ref 3.4–5.3)
RBC # BLD AUTO: 2.87 10E12/L (ref 4.4–5.9)
SODIUM SERPL-SCNC: 139 MMOL/L (ref 133–144)
WBC # BLD AUTO: 6.1 10E9/L (ref 4–11)

## 2018-03-16 PROCEDURE — 83735 ASSAY OF MAGNESIUM: CPT | Performed by: PHYSICIAN ASSISTANT

## 2018-03-16 PROCEDURE — 27210429 ZZH NUTRITION PRODUCT INTERMEDIATE LITER

## 2018-03-16 PROCEDURE — 85027 COMPLETE CBC AUTOMATED: CPT | Performed by: PHYSICIAN ASSISTANT

## 2018-03-16 PROCEDURE — 97535 SELF CARE MNGMENT TRAINING: CPT | Mod: GO

## 2018-03-16 PROCEDURE — 40000275 ZZH STATISTIC RCP TIME EA 10 MIN

## 2018-03-16 PROCEDURE — 71045 X-RAY EXAM CHEST 1 VIEW: CPT

## 2018-03-16 PROCEDURE — 94640 AIRWAY INHALATION TREATMENT: CPT

## 2018-03-16 PROCEDURE — 99291 CRITICAL CARE FIRST HOUR: CPT | Performed by: NURSE PRACTITIONER

## 2018-03-16 PROCEDURE — 80048 BASIC METABOLIC PNL TOTAL CA: CPT | Performed by: PHYSICIAN ASSISTANT

## 2018-03-16 PROCEDURE — 40000008 ZZH STATISTIC AIRWAY CARE

## 2018-03-16 PROCEDURE — 40000986 XR ABDOMEN PORT 1 VW

## 2018-03-16 PROCEDURE — 25000125 ZZHC RX 250: Performed by: INTERNAL MEDICINE

## 2018-03-16 PROCEDURE — 25000128 H RX IP 250 OP 636: Performed by: HOSPITALIST

## 2018-03-16 PROCEDURE — 25000125 ZZHC RX 250: Performed by: HOSPITALIST

## 2018-03-16 PROCEDURE — 25000128 H RX IP 250 OP 636: Performed by: NURSE PRACTITIONER

## 2018-03-16 PROCEDURE — 25000132 ZZH RX MED GY IP 250 OP 250 PS 637: Performed by: HOSPITALIST

## 2018-03-16 PROCEDURE — 94640 AIRWAY INHALATION TREATMENT: CPT | Mod: 76

## 2018-03-16 PROCEDURE — 25000125 ZZHC RX 250

## 2018-03-16 PROCEDURE — 25000125 ZZHC RX 250: Performed by: PHYSICIAN ASSISTANT

## 2018-03-16 PROCEDURE — 85520 HEPARIN ASSAY: CPT | Performed by: PHYSICIAN ASSISTANT

## 2018-03-16 PROCEDURE — 40000133 ZZH STATISTIC OT WARD VISIT

## 2018-03-16 PROCEDURE — 94003 VENT MGMT INPAT SUBQ DAY: CPT

## 2018-03-16 PROCEDURE — 00000146 ZZHCL STATISTIC GLUCOSE BY METER IP

## 2018-03-16 PROCEDURE — 25000128 H RX IP 250 OP 636: Performed by: PHYSICIAN ASSISTANT

## 2018-03-16 PROCEDURE — 25000128 H RX IP 250 OP 636: Performed by: ANESTHESIOLOGY

## 2018-03-16 PROCEDURE — 25000128 H RX IP 250 OP 636: Performed by: INTERNAL MEDICINE

## 2018-03-16 PROCEDURE — 20000003 ZZH R&B ICU

## 2018-03-16 PROCEDURE — 40000239 ZZH STATISTIC VAT ROUNDS

## 2018-03-16 RX ORDER — HYDRALAZINE HYDROCHLORIDE 20 MG/ML
10 INJECTION INTRAMUSCULAR; INTRAVENOUS EVERY 6 HOURS PRN
Status: DISCONTINUED | OUTPATIENT
Start: 2018-03-16 | End: 2018-03-27 | Stop reason: HOSPADM

## 2018-03-16 RX ORDER — METOCLOPRAMIDE HYDROCHLORIDE 5 MG/ML
10 INJECTION INTRAMUSCULAR; INTRAVENOUS ONCE
Status: COMPLETED | OUTPATIENT
Start: 2018-03-16 | End: 2018-03-16

## 2018-03-16 RX ORDER — ACETAMINOPHEN 325 MG/1
650 TABLET ORAL EVERY 4 HOURS PRN
Status: DISCONTINUED | OUTPATIENT
Start: 2018-03-16 | End: 2018-03-23

## 2018-03-16 RX ORDER — FUROSEMIDE 10 MG/ML
20 INJECTION INTRAMUSCULAR; INTRAVENOUS ONCE
Status: DISCONTINUED | OUTPATIENT
Start: 2018-03-16 | End: 2018-03-16

## 2018-03-16 RX ADMIN — METOCLOPRAMIDE 10 MG: 5 INJECTION, SOLUTION INTRAMUSCULAR; INTRAVENOUS at 14:59

## 2018-03-16 RX ADMIN — IPRATROPIUM BROMIDE AND ALBUTEROL SULFATE 3 ML: .5; 3 SOLUTION RESPIRATORY (INHALATION) at 15:21

## 2018-03-16 RX ADMIN — CHLORHEXIDINE GLUCONATE 15 ML: 1.2 RINSE ORAL at 07:48

## 2018-03-16 RX ADMIN — LIDOCAINE HYDROCHLORIDE 1 TUBE: 20 JELLY TOPICAL at 15:02

## 2018-03-16 RX ADMIN — CHLORHEXIDINE GLUCONATE 15 ML: 1.2 RINSE ORAL at 20:18

## 2018-03-16 RX ADMIN — IPRATROPIUM BROMIDE AND ALBUTEROL SULFATE 3 ML: .5; 3 SOLUTION RESPIRATORY (INHALATION) at 11:40

## 2018-03-16 RX ADMIN — IPRATROPIUM BROMIDE AND ALBUTEROL SULFATE 3 ML: .5; 3 SOLUTION RESPIRATORY (INHALATION) at 07:01

## 2018-03-16 RX ADMIN — ENOXAPARIN SODIUM 60 MG: 60 INJECTION SUBCUTANEOUS at 17:56

## 2018-03-16 RX ADMIN — HEPARIN SODIUM 1500 UNITS/HR: 10000 INJECTION, SOLUTION INTRAVENOUS at 09:29

## 2018-03-16 RX ADMIN — PROPOFOL 15 MCG/KG/MIN: 10 INJECTION, EMULSION INTRAVENOUS at 17:55

## 2018-03-16 RX ADMIN — ASCORBIC ACID, VITAMIN A PALMITATE, CHOLECALCIFEROL, THIAMINE HYDROCHLORIDE, RIBOFLAVIN-5 PHOSPHATE SODIUM, PYRIDOXINE HYDROCHLORIDE, NIACINAMIDE, DEXPANTHENOL, ALPHA-TOCOPHEROL ACETATE, VITAMIN K1, FOLIC ACID, BIOTIN, CYANOCOBALAMIN: 200; 3300; 200; 6; 3.6; 6; 40; 15; 10; 150; 600; 60; 5 INJECTION, SOLUTION INTRAVENOUS at 20:09

## 2018-03-16 RX ADMIN — RANITIDINE HYDROCHLORIDE 150 MG: 150 SOLUTION ORAL at 20:17

## 2018-03-16 RX ADMIN — I.V. FAT EMULSION 250 ML: 20 EMULSION INTRAVENOUS at 09:16

## 2018-03-16 RX ADMIN — PROPOFOL 15 MCG/KG/MIN: 10 INJECTION, EMULSION INTRAVENOUS at 03:45

## 2018-03-16 RX ADMIN — IPRATROPIUM BROMIDE AND ALBUTEROL SULFATE 3 ML: .5; 3 SOLUTION RESPIRATORY (INHALATION) at 19:38

## 2018-03-16 RX ADMIN — FAMOTIDINE 20 MG: 10 INJECTION, SOLUTION INTRAVENOUS at 09:30

## 2018-03-16 RX ADMIN — Medication 0.5 MG/MIN: at 16:18

## 2018-03-16 RX ADMIN — Medication 0.5 MG/MIN: at 06:10

## 2018-03-16 ASSESSMENT — PAIN DESCRIPTION - DESCRIPTORS
DESCRIPTORS: PATIENT UNABLE TO DESCRIBE
DESCRIPTORS: PATIENT UNABLE TO DESCRIBE

## 2018-03-16 NOTE — PLAN OF CARE
Problem: Patient Care Overview  Goal: Plan of Care/Patient Progress Review  Outcome: Improving  Pt remains on full vent support, sedated on propofol. Weaned sedation and pt following simple commands. Dilaudid given x1 for pian. HR controlled  however irregular on heparin and amio gtt. TPN infusing. BS active and 100cc bile output from OG overnight. Fragile skin w/ multiple skin tears. Plan to continue to wean and increase nutrition.

## 2018-03-16 NOTE — PROGRESS NOTES
[facility]    Critical Care Service  Progress Note    Date of Service (when I saw the patient): 03/16/2018     Main Plans for Today   Place feeding tube  Start feeds  PST  Assessment & Plan   Problem List   SBO s/p ex lap, TISHA and partial small bowel resection 3/4  A-fib with RVR  Acute hypercarbic respiratory failure s/p re-intubation  Aspiration   Protein calorie malnutrition, on TPN   Pulmonary fibrosis       Assessment & Plan   Joseluis Falcon is a 85 male initially admitted for SBO on 2/27, initially managed non-operatively now s/p lysis of adhesions and partial small bowel resection on 3/4/18. Decompensated with encephalopathy and respiratory failure night of 3/6, intubated and transferred to ICU. Extubated 3/11 and re-intubated 3/12 after increased WOB. Ongoing issues with prolonged ileus and inability to wean from ventilator.       Neurology/Psychiatry:   1. Encephalopathy, mixed metabolic with delirium componant  Plan  - propofol gtt, goal RASS  0, wean as able and assess neuro status   - sedation vacation during PST daily  - prn dilaudid  / tylenol      Cardiovascular:   1. Atrial fibrillation  ( RVR resolved, currently in sinus)  Plan  - BP goal MAP >65, no pressors   - EP consulted earlier,. now back in sinus rhythm.  Amio drip increased back up yesterday 2nd another bout of afib.  Will consider transitioning to PO when GI access stable.  - On heparin gtt for afib, Creat stable, no signs of bleeding.  Will transition to lovenox for ease of administration.  Long term anticoagulation to be determined at a later date.       Pulmonary/Ventilator Management:   1. Acute hypercarbic respiratory failure -first respiratory failure in setting of recent surgery, likely aspiration with evidence of REJI physiology on initial eval. Extubated 3/11 and re-intubated 3/12 after increased WOB and failed BiPAP trial.   2. Aspiration pneumonia  3. Mild emphysema/fibrosis - ? Age related   Plan  - Extubated 3/11,  re-intubated overnight 3/12. Increased WOB and poor air movement. CXR appeared minimally changed.   - CXR today, auto diuresing well, will hold off on any diuretics right now  - Pulmonary toilet   - Scheduled duonebs   - PST daily.  No evening extubation planned      GI and Nutrition :   1. Small bowel obstruction s/p ex lap, TISHA and partial small bowel resection 3/4 - with subsequent imaging suggestive of continued obstruction vs ileus   2. Ileus   3. Severe protein calorie malnutrition  4. GERD/Carlson's   Plan  - Replaced NG tube, low intermittent suction; minimal output daily   - H2 blocker   - TPN, continue for now per surgery   - question initiation of trickle feeds as return of bowel function yesterday   - abd exam is benign       Renal/Fluids/Electrolytes:   1. ?Mild BJ, slight elevation in creatinine --- resolved  2. Electrolyte abnormality - hypernatremia resolved  Plan  - maintain hemodynamics   - monitor function and electrolytes as needed with replacement per ICU protocols.  - generally avoid nephrotoxic agents such as NSAID, IV contrast unless specifically required  - adjust medications as needed for renal clearance  - follow I/O's as appropriate      Infectious Disease:   1. Aspiration pneumonitis vs pneumonia  Plan  - zosyn  7 day course for suspected aspiration PNA completed  - no abx currently       Endocrine:   1. Stress induced hyperglycemia-- resolved  Plan  - Goal sugar <180 for improved healing  - SSI      Hematology/Oncology:   1. Normocytic anemia  2. Thrombocytopenia, resolved   Plan  - Trend Hgb, stable today despite drop yesterday.  No s/sy active blood loss or hemodynamic instability    General cares:  DVT Prophylaxis: Enoxaparin (Lovenox) SQ  GI Prophylaxis: H2 Blocker  Restraints: Restraints for medical healing needed: YES  Family update by me today: No  Current lines are required for patient management  Access: PICC placed 3/6/18    Brooklyn Maguire    Time Spent on this Encounter    Billing:  I spent 60 minutes bedside and on the inpatient unit today managing the critical care of Joseluis Falcon in relation to the issues listed in this note.    Interval History   No acute events.  Pt tolerated PST for 90 minutes today prior to increase WOB fail.  On exam, opens eyes, but no commands no ROS     Physical Exam   Temp: 97.5  F (36.4  C) Temp src: Axillary Temp  Min: 97.5  F (36.4  C)  Max: 98.6  F (37  C) BP: 113/58   Heart Rate: 74 Resp: (!) 32 SpO2: 96 % O2 Device: Mechanical Ventilator    Vitals:    03/14/18 0400 03/15/18 0000 03/16/18 0300   Weight: 65.4 kg (144 lb 2.9 oz) 64.9 kg (143 lb 1.3 oz) 64.1 kg (141 lb 5 oz)     I/O last 3 completed shifts:  In: 3201.28 [I.V.:1041.28]  Out: 3185 [Urine:2885; Emesis/NG output:300]    GEN: sedate  EYES: PERRL, Anicteric sclera.   HEENT:  Normocephalic, atraumatic, trachea midline, ETT secure  CV: RRR, no gallops, rubs, or murmurs  PULM/CHEST: Clear breath sounds bilaterally without rhonchi, crackles or wheeze, symmetric chest rise  GI: soft, non-tender?, no guarding  : alba catheter in place, urine yellow and clear  EXTREMITIES: no peripheral edema, moving all extremities, peripheral pulses intact  NEURO: Cranial nerves II-XII grossly intact, no focal deficits noted  SKIN: warm dry and thin  PSYCH:  LEANN  Imaging personally reviewed: no new  ECG SR    Medications     propofol (DIPRIVAN) infusion 15 mcg/kg/min (03/16/18 1142)     parenteral nutrition - Clinimix E 90 mL/hr at 03/16/18 0759     IV fluid REPLACEMENT ONLY       amiodarone 0.5 mg/min (03/16/18 0759)     HEParin 1,500 Units/hr (03/16/18 0988)       rantidine  150 mg Oral BID     lidocaine   Topical Once     metoclopramide  10 mg Intravenous Once     ipratropium - albuterol 0.5 mg/2.5 mg/3 mL  3 mL Nebulization 4x daily     insulin aspart  1-6 Units Subcutaneous Q4H     chlorhexidine  15 mL Mouth/Throat Q12H     sodium chloride (PF)  3 mL Intracatheter Q8H     sodium chloride (PF)  10 mL  Intracatheter Q8H     lipids  250 mL Intravenous Every Other Day       Data     Recent Labs  Lab 03/16/18  0320 03/15/18  0407 03/14/18  0405  03/12/18  0620 03/12/18  0220   WBC 6.1 6.8 4.9  < >  --  9.8   HGB 9.1* 9.8* 11.6*  < >  --  10.8*   * 100 100  < >  --  99    295 202  < >  --  183   INR  --   --   --   --  1.16*  --     140 140  < >  --  144   POTASSIUM 4.5 4.5 4.4  < >  --  4.0   CHLORIDE 106 106 107  < >  --  113*   CO2 30 29 29  < >  --  27   BUN 33* 29 29  < >  --  26   CR 0.79 0.78 0.83  < >  --  0.92   ANIONGAP 3 5 4  < >  --  4   TOBI 7.9* 7.8* 7.7*  < >  --  7.8*   * 118* 117*  < >  --  110*   ALBUMIN  --   --   --   --   --  1.8*   PROTTOTAL  --   --   --   --   --  5.6*   BILITOTAL  --   --   --   --   --  0.7   ALKPHOS  --   --   --   --   --  61   ALT  --   --   --   --   --  22   AST  --   --   --   --   --  28   < > = values in this interval not displayed.  No results found for this or any previous visit (from the past 24 hour(s)).

## 2018-03-16 NOTE — PROVIDER NOTIFICATION
Notified Dr. Merritt around 1930 regarding continued rapid a fib with -135 despite amio gtt increase earlier this evening. BP's stable. Received orders for 2.5mg of metoprolol.

## 2018-03-16 NOTE — PLAN OF CARE
Problem: Ventilation, Mechanical Invasive (Adult)  Goal: Signs and Symptoms of Listed Potential Problems Will be Absent, Minimized or Managed (Ventilation, Mechanical Invasive)  Signs and symptoms of listed potential problems will be absent, minimized or managed by discharge/transition of care (reference Ventilation, Mechanical Invasive (Adult) CPG).   Outcome: No Change  Patient awake and follow commands off the [place] gtt. Patient failed vent weaning today. Patient was on  8/5 PS/Peep FIO2 40% Patient's own tidal volume 400's-500's. O2 sat high 90's.Patient get short of breaths intermittently during the weaning. Up in chair. Restless intermittently. Diuresing well. Palpable pulses. Cont to monitor.Mehnaz Alonzo, RN, BSN, BC, CCRN

## 2018-03-16 NOTE — PHARMACY-CONSULT NOTE
Pharmacy Tube Feeding Consult    Medication reviewed for administration by feeding tube and for potential food/drug interactions.    Recommendation: Recommend changing the following medications to a liquid dosage form: famotidine --> ranitidine.  Acetaminophen.      Pharmacy will continue to follow as new medications are ordered.    Елена Allison, PharmD

## 2018-03-16 NOTE — PROGRESS NOTES
[facility] ICU RESPIRATORY NOTE  Date of Admission:2/27/2018  Date of Intubation (most recent):3/12/2018 (reintubated)  Reason for Mechanical Ventilation : Acute hypercarbic respiratory failure   Number of Days on Mechanical Ventilation: 5  Met Criteria for Pressure Support Trial:Yes  Length of Pressure Support Trial: None  Reason for Stopping Pressure Support Trial:  Reason for No Pressure Support Trial: Per MD     Significant Events Today: None    No lab results found in last 7 days.    Ventilation Mode: CMV/AC  (Continuous Mandatory Ventilation/ Assist Control)  FiO2 (%): 40 %  Rate Set (breaths/minute): 16 breaths/min  Tidal Volume Set (mL): 450 mL  PEEP (cm H2O): 5 cmH2O  Pressure Support (cm H2O): 8 cmH2O  Oxygen Concentration (%): 40 %  Resp: 25    Will continue to follow.     James Love RT

## 2018-03-16 NOTE — PLAN OF CARE
Problem: Patient Care Overview  Goal: Plan of Care/Patient Progress Review  OT: Goals updated as appropriate.   Discharge Planner OT   Patient plan for discharge: None stated  Current status: Pt on vent during session. Pt transferred to the converter chair with ceiling lift. While seated in chair pt completed 1 grooming task. Pt required maximum assist for Left and right UE and hand over hand assist when bringing comb up to hair. Pt demonstarted weakness in bilateral upper extremities. RUE weaker than LUE. Pt following commands, however not consistently ~75%. OT Frequency set at 5x/week   Barriers to return to prior living situation: Current level of assist; decreased endurance for I/ADLs  Recommendations for discharge: TCU  Rationale for recommendations: Pt would benefit from continued skilled OT to increase functional independence in I/ADL tasks        Entered by: Mikie Huffman 03/16/2018 11:02 AM

## 2018-03-16 NOTE — PROGRESS NOTES
Surgery    No significant changes. Failed SBT again today.    Abd- Soft. Non distended or tender.    A/P  Would recommend placing N-J tube to start TF. Can advance as tolerated.     Robel Gomez M.D.  [facility]  [phone]

## 2018-03-16 NOTE — CONSULTS
BRIEF NUTRITION NOTE:    Was asked to initiate TF for pt.  A full Nutrition Reassessment was completed 3/15.  See note for details.    NEW FINDINGS:  Surgery has indicated via N-J feeding tube  Plan for P/P FT placement today per Brooklyn Maguire NP.    Propofol running at 5.9 mL/hr = 156 kcals (lipid).  TPN D15 AA5 at 90 mL/hr + Lipids 250 mL every other day = 1784 kcal, 108 g protein (1.6 g/kg), 324 g CHO, 25% fat   Total (TPN/Lipid + Propofol):  1940 kcals (30 kcal/kg).    INTERVENTIONS:  Enteral Nutrition - Initiate TF Promote with Fiber at 10 mL/hr to provide:  240 kcal, 15 g protein, 33 g CHO, 3 g fiber, 199 mL H2O  Total (TPN/Lipid + TF + Propofol)= 2180 kcal (34 kcal/kg), 123 g protein (1.9 g/kg)  Feeding tube flush - Ordered minimal H20 flushes for now, 30 mL every 4 hrs    RECOMMENDATIONS:  Eventual TF goal would be Promote with Fiber at 80 mL/hr to provide:  1920 kcal (30 kcal/kg), 120 g protein (1.9 g/kg), 265 g CHO, 27 g fiber, 1594 mL H2O  Total with Propofol = 2076 kcal (32 kcal/kg).  Would taper TPN as TF rate increases (RD to follow up over week-end).    Wendi Swnason, RD, LD, CNSC

## 2018-03-16 NOTE — PROGRESS NOTES
[facility] ICU RESPIRATORY NOTE  Date of Admission:  2/27/18  Date of Intubation (most recent):  3/12/18  Reason for Mechanical Ventilation:  Resp failure  Number of Days on Mechanical Ventilation:  5  Met Criteria for Pressure Support Trial:  Yes  Length of Pressure Support Trial:  8/5 for 1.5 hrs  Reason for Stopping Pressure Support Trial:  Reason for No Pressure Support Trial:  Increased WOB    Significant Events Today:  None    ABG Results:    ETT appearance on chest x-ray:    Plan:  Pt to remain on full vent support overnight

## 2018-03-16 NOTE — PROGRESS NOTES
Pt was on  P/S 8/5 40%. Tolerated only for an hour, tachypnic and agitation. BBS Rhonchi mostly. Normal vital. Will assess and follow for weaning readiness.3/15/2018  Jim Pimentel

## 2018-03-17 LAB
ANION GAP SERPL CALCULATED.3IONS-SCNC: 5 MMOL/L (ref 3–14)
ANION GAP SERPL CALCULATED.3IONS-SCNC: NORMAL MMOL/L (ref 6–17)
ANION GAP SERPL CALCULATED.3IONS-SCNC: NORMAL MMOL/L (ref 6–17)
BASE EXCESS BLDV CALC-SCNC: 5.4 MMOL/L
BUN SERPL-MCNC: 31 MG/DL (ref 7–30)
BUN SERPL-MCNC: NORMAL MG/DL (ref 7–30)
BUN SERPL-MCNC: NORMAL MG/DL (ref 7–30)
CALCIUM SERPL-MCNC: 8.1 MG/DL (ref 8.5–10.1)
CALCIUM SERPL-MCNC: NORMAL MG/DL (ref 8.5–10.1)
CALCIUM SERPL-MCNC: NORMAL MG/DL (ref 8.5–10.1)
CHLORIDE SERPL-SCNC: 105 MMOL/L (ref 94–109)
CHLORIDE SERPL-SCNC: NORMAL MMOL/L (ref 94–109)
CHLORIDE SERPL-SCNC: NORMAL MMOL/L (ref 94–109)
CO2 SERPL-SCNC: 28 MMOL/L (ref 20–32)
CO2 SERPL-SCNC: NORMAL MMOL/L (ref 20–32)
CO2 SERPL-SCNC: NORMAL MMOL/L (ref 20–32)
CREAT SERPL-MCNC: 0.73 MG/DL (ref 0.66–1.25)
CREAT SERPL-MCNC: NORMAL MG/DL (ref 0.66–1.25)
CREAT SERPL-MCNC: NORMAL MG/DL (ref 0.66–1.25)
ERYTHROCYTE [DISTWIDTH] IN BLOOD BY AUTOMATED COUNT: 13.5 % (ref 10–15)
GFR SERPL CREATININE-BSD FRML MDRD: >90 ML/MIN/1.7M2
GFR SERPL CREATININE-BSD FRML MDRD: NORMAL ML/MIN/1.7M2
GFR SERPL CREATININE-BSD FRML MDRD: NORMAL ML/MIN/1.7M2
GLUCOSE BLDC GLUCOMTR-MCNC: 107 MG/DL (ref 70–99)
GLUCOSE BLDC GLUCOMTR-MCNC: 117 MG/DL (ref 70–99)
GLUCOSE BLDC GLUCOMTR-MCNC: 117 MG/DL (ref 70–99)
GLUCOSE BLDC GLUCOMTR-MCNC: 124 MG/DL (ref 70–99)
GLUCOSE BLDC GLUCOMTR-MCNC: 126 MG/DL (ref 70–99)
GLUCOSE SERPL-MCNC: 117 MG/DL (ref 70–99)
GLUCOSE SERPL-MCNC: NORMAL MG/DL (ref 70–99)
GLUCOSE SERPL-MCNC: NORMAL MG/DL (ref 70–99)
HCO3 BLDV-SCNC: 32 MMOL/L (ref 21–28)
HCT VFR BLD AUTO: 28.6 % (ref 40–53)
HGB BLD-MCNC: 9.1 G/DL (ref 13.3–17.7)
MAGNESIUM SERPL-MCNC: 2.2 MG/DL (ref 1.6–2.3)
MAGNESIUM SERPL-MCNC: NORMAL MG/DL (ref 1.6–2.3)
MAGNESIUM SERPL-MCNC: NORMAL MG/DL (ref 1.6–2.3)
MCH RBC QN AUTO: 32.2 PG (ref 26.5–33)
MCHC RBC AUTO-ENTMCNC: 31.8 G/DL (ref 31.5–36.5)
MCV RBC AUTO: 101 FL (ref 78–100)
OXYHGB MFR BLDV: 87 %
PCO2 BLDV: 54 MM HG (ref 40–50)
PH BLDV: 7.38 PH (ref 7.32–7.43)
PHOSPHATE SERPL-MCNC: 3.1 MG/DL (ref 2.5–4.5)
PHOSPHATE SERPL-MCNC: NORMAL MG/DL (ref 2.5–4.5)
PHOSPHATE SERPL-MCNC: NORMAL MG/DL (ref 2.5–4.5)
PLATELET # BLD AUTO: 295 10E9/L (ref 150–450)
PO2 BLDV: 54 MM HG (ref 25–47)
POTASSIUM SERPL-SCNC: 4.5 MMOL/L (ref 3.4–5.3)
POTASSIUM SERPL-SCNC: NORMAL MMOL/L (ref 3.4–5.3)
POTASSIUM SERPL-SCNC: NORMAL MMOL/L (ref 3.4–5.3)
RBC # BLD AUTO: 2.83 10E12/L (ref 4.4–5.9)
SODIUM SERPL-SCNC: 138 MMOL/L (ref 133–144)
SODIUM SERPL-SCNC: NORMAL MMOL/L (ref 133–144)
SODIUM SERPL-SCNC: NORMAL MMOL/L (ref 133–144)
WBC # BLD AUTO: 7.2 10E9/L (ref 4–11)

## 2018-03-17 PROCEDURE — 94640 AIRWAY INHALATION TREATMENT: CPT | Mod: 76

## 2018-03-17 PROCEDURE — 25000128 H RX IP 250 OP 636: Performed by: PHYSICIAN ASSISTANT

## 2018-03-17 PROCEDURE — 25000125 ZZHC RX 250: Performed by: NURSE PRACTITIONER

## 2018-03-17 PROCEDURE — 94003 VENT MGMT INPAT SUBQ DAY: CPT

## 2018-03-17 PROCEDURE — 80048 BASIC METABOLIC PNL TOTAL CA: CPT | Performed by: HOSPITALIST

## 2018-03-17 PROCEDURE — 94640 AIRWAY INHALATION TREATMENT: CPT

## 2018-03-17 PROCEDURE — 40000275 ZZH STATISTIC RCP TIME EA 10 MIN

## 2018-03-17 PROCEDURE — 84100 ASSAY OF PHOSPHORUS: CPT | Performed by: HOSPITALIST

## 2018-03-17 PROCEDURE — 00000146 ZZHCL STATISTIC GLUCOSE BY METER IP

## 2018-03-17 PROCEDURE — 99291 CRITICAL CARE FIRST HOUR: CPT | Performed by: NURSE PRACTITIONER

## 2018-03-17 PROCEDURE — 85027 COMPLETE CBC AUTOMATED: CPT | Performed by: INTERNAL MEDICINE

## 2018-03-17 PROCEDURE — 36415 COLL VENOUS BLD VENIPUNCTURE: CPT | Performed by: HOSPITALIST

## 2018-03-17 PROCEDURE — 25000132 ZZH RX MED GY IP 250 OP 250 PS 637: Performed by: HOSPITALIST

## 2018-03-17 PROCEDURE — 25000128 H RX IP 250 OP 636: Performed by: ANESTHESIOLOGY

## 2018-03-17 PROCEDURE — 82805 BLOOD GASES W/O2 SATURATION: CPT | Performed by: NURSE PRACTITIONER

## 2018-03-17 PROCEDURE — 25000125 ZZHC RX 250: Performed by: PHYSICIAN ASSISTANT

## 2018-03-17 PROCEDURE — 20000003 ZZH R&B ICU

## 2018-03-17 PROCEDURE — 83735 ASSAY OF MAGNESIUM: CPT | Performed by: INTERNAL MEDICINE

## 2018-03-17 PROCEDURE — 40000239 ZZH STATISTIC VAT ROUNDS

## 2018-03-17 PROCEDURE — 40000008 ZZH STATISTIC AIRWAY CARE

## 2018-03-17 PROCEDURE — 80048 BASIC METABOLIC PNL TOTAL CA: CPT | Performed by: INTERNAL MEDICINE

## 2018-03-17 PROCEDURE — 83735 ASSAY OF MAGNESIUM: CPT | Performed by: HOSPITALIST

## 2018-03-17 PROCEDURE — 84100 ASSAY OF PHOSPHORUS: CPT | Performed by: INTERNAL MEDICINE

## 2018-03-17 PROCEDURE — 25000128 H RX IP 250 OP 636: Performed by: NURSE PRACTITIONER

## 2018-03-17 RX ORDER — AMIODARONE HYDROCHLORIDE 200 MG/1
400 TABLET ORAL 2 TIMES DAILY
Status: COMPLETED | OUTPATIENT
Start: 2018-03-17 | End: 2018-03-20

## 2018-03-17 RX ORDER — AMIODARONE HYDROCHLORIDE 200 MG/1
200 TABLET ORAL DAILY
Status: DISCONTINUED | OUTPATIENT
Start: 2018-03-24 | End: 2018-03-17

## 2018-03-17 RX ORDER — AMIODARONE HYDROCHLORIDE 200 MG/1
200 TABLET ORAL 2 TIMES DAILY
Status: DISCONTINUED | OUTPATIENT
Start: 2018-03-21 | End: 2018-03-17

## 2018-03-17 RX ORDER — AMIODARONE HYDROCHLORIDE 200 MG/1
400 TABLET ORAL 2 TIMES DAILY
Status: DISCONTINUED | OUTPATIENT
Start: 2018-03-17 | End: 2018-03-17

## 2018-03-17 RX ORDER — AMIODARONE HYDROCHLORIDE 200 MG/1
200 TABLET ORAL 2 TIMES DAILY
Status: COMPLETED | OUTPATIENT
Start: 2018-03-21 | End: 2018-03-22

## 2018-03-17 RX ORDER — AMIODARONE HYDROCHLORIDE 200 MG/1
200 TABLET ORAL DAILY
Status: DISCONTINUED | OUTPATIENT
Start: 2018-03-23 | End: 2018-03-23

## 2018-03-17 RX ADMIN — Medication 0.5 MG/MIN: at 08:27

## 2018-03-17 RX ADMIN — AMIODARONE HYDROCHLORIDE 400 MG: 200 TABLET ORAL at 14:10

## 2018-03-17 RX ADMIN — Medication 0.5 MG/MIN: at 00:36

## 2018-03-17 RX ADMIN — HYDROMORPHONE HYDROCHLORIDE 0.2 MG: 1 INJECTION, SOLUTION INTRAMUSCULAR; INTRAVENOUS; SUBCUTANEOUS at 04:30

## 2018-03-17 RX ADMIN — DEXMEDETOMIDINE HYDROCHLORIDE 0.2 MCG/KG/HR: 100 INJECTION, SOLUTION INTRAVENOUS at 13:54

## 2018-03-17 RX ADMIN — PROPOFOL 35 MCG/KG/MIN: 10 INJECTION, EMULSION INTRAVENOUS at 06:43

## 2018-03-17 RX ADMIN — AMIODARONE HYDROCHLORIDE 400 MG: 200 TABLET ORAL at 21:46

## 2018-03-17 RX ADMIN — CHLORHEXIDINE GLUCONATE 15 ML: 1.2 RINSE ORAL at 08:54

## 2018-03-17 RX ADMIN — ENOXAPARIN SODIUM 60 MG: 60 INJECTION SUBCUTANEOUS at 06:43

## 2018-03-17 RX ADMIN — IPRATROPIUM BROMIDE AND ALBUTEROL SULFATE 3 ML: .5; 3 SOLUTION RESPIRATORY (INHALATION) at 07:20

## 2018-03-17 RX ADMIN — IPRATROPIUM BROMIDE AND ALBUTEROL SULFATE 3 ML: .5; 3 SOLUTION RESPIRATORY (INHALATION) at 11:01

## 2018-03-17 RX ADMIN — IPRATROPIUM BROMIDE AND ALBUTEROL SULFATE 3 ML: .5; 3 SOLUTION RESPIRATORY (INHALATION) at 19:19

## 2018-03-17 RX ADMIN — RANITIDINE HYDROCHLORIDE 150 MG: 150 SOLUTION ORAL at 10:11

## 2018-03-17 RX ADMIN — CHLORHEXIDINE GLUCONATE 15 ML: 1.2 RINSE ORAL at 19:57

## 2018-03-17 RX ADMIN — RANITIDINE HYDROCHLORIDE 150 MG: 150 SOLUTION ORAL at 21:46

## 2018-03-17 RX ADMIN — ENOXAPARIN SODIUM 60 MG: 60 INJECTION SUBCUTANEOUS at 18:23

## 2018-03-17 RX ADMIN — HYDROMORPHONE HYDROCHLORIDE 0.2 MG: 1 INJECTION, SOLUTION INTRAMUSCULAR; INTRAVENOUS; SUBCUTANEOUS at 00:38

## 2018-03-17 RX ADMIN — IPRATROPIUM BROMIDE AND ALBUTEROL SULFATE 3 ML: .5; 3 SOLUTION RESPIRATORY (INHALATION) at 15:08

## 2018-03-17 ASSESSMENT — PAIN DESCRIPTION - DESCRIPTORS: DESCRIPTORS: PATIENT UNABLE TO DESCRIBE

## 2018-03-17 NOTE — PROGRESS NOTES
Patient placed again on PS 8 and Peep 5,FIO2 40%  at 1508. Observed short of breathing. Patient has nasal flaring and abdominal breathing.Patients resp rate 37,Patient s own tidal volume is 400's-500's..Back to CMV to rest.Cont to [facility].Mehnaz Alonzo, RN, BSN, BC, CCRN

## 2018-03-17 NOTE — PROGRESS NOTES
Chart reviewed, noted TF to advance to goal per NP orders --> Currently receiving Promote with Fiber at 20 mL/hr with plans to advance every 6 hours by 10 mL to goal of 80 mL/hr = 1920 kcal (30 kcal/kg), 120 g protein (1.9 g/kg), 265 g CHO, 27 g fiber, 1594 mL H2O    Contacted Pharm via Phone regarding TPN (currently running at 90 mL/hr) - after her discussion with NP, plan will be to cut in half and then let bag run out.    Noted Propofol off today.    Shelby Amaya, RD, LD, [facility]   Clinical Dietitian - [facility]

## 2018-03-17 NOTE — PROGRESS NOTES
Patient placed on CPAP for possible extubation.  I was informed patient became short of breath and was distressed.  Placed on CMV, will attempt SBT in am.    Joseluis Crawford MD

## 2018-03-17 NOTE — PLAN OF CARE
Problem: Patient Care Overview  Goal: Plan of Care/Patient Progress Review  PT and OT: Pt not appropriate for therapy today.  Will continue to follow.

## 2018-03-17 NOTE — PROVIDER NOTIFICATION
Patient weaned for 2 hrs as ordered. Patient is more awake. Off Propofol gtt. Following commands. Placed on PS/Peep  8/5 FIO2 40%, O2 sat 100%. Patients own tidal volume 400's-500's. No resp distress noted. Patient denies short of breath. Stable vital signs. Updated [place] NP ICU and Dr Crawford. Back to CMV to rest,possible for extubation. Checked NIF and vitality. -29 NIF and Vitality 1.1 L. Cont to monitor.Mehnaz Alonzo, RN, BSN, BC, CCRN

## 2018-03-17 NOTE — PROGRESS NOTES
[facility]    Critical Care Service  Progress Note    Date of Service (when I saw the patient): 03/17/2018     Main Plans for Today   PST  OOB    Problem List   SBO s/p ex lap, TISHA and partial small bowel resection 3/4  A-fib with RVR  Acute hypercarbic respiratory failure s/p re-intubation  Aspiration   Protein calorie malnutrition, on TPN   Pulmonary fibrosis   Assessment & Plan   Joseluis Falcon is a 85 male initially admitted for SBO on 2/27, initially managed non-operatively now s/p lysis of adhesions and partial small bowel resection on 3/4/18. Decompensated with encephalopathy and respiratory failure night of 3/6, intubated and transferred to ICU. Extubated 3/11 and re-intubated 3/12 after increased WOB. Ongoing issues with prolonged ileus and inability to wean from ventilator.       Neurology/Psychiatry:   1. Encephalopathy, mixed metabolic with delirium componant  Plan  - dc propofol and start dex gtt, goal RASS  0, wean as able and assess neuro status   - sedation vacation during PST daily  - prn dilaudid  / tylenol      Cardiovascular:   1. Atrial fibrillation  ( RVR resolved, currently in sinus)  Plan  - BP goal MAP >65, no pressors   - EP consulted earlier,. now back in sinus rhythm.  Amio drip increased back up Friday 2nd another bout of afib. Will attempt to  transitioning to PO today with an amio wean, if he has more issue with AFib, will prob need to dc the 'wean to off' schedule and continue med.  - Now on sq lovenox treatment dosing for ease of administration.  Long term anticoagulation to be determined at a later date.       Pulmonary/Ventilator Management:   1. Acute hypercarbic respiratory failure -first respiratory failure in setting of recent surgery, likely aspiration with evidence of REJI physiology on initial eval. Extubated 3/11 and re-intubated 3/12 after increased WOB and failed BiPAP trial.   2. Aspiration pneumonia  3. Mild emphysema/fibrosis - ? Age related   Plan  -  Extubated 3/11, re-intubated overnight 3/12. Increased WOB and poor air movement. CXR appeared minimally changed.   - CXR today, auto diuresing well, will hold off on any diuretics right now  - Pulmonary toilet   - Scheduled duonebs   - PST daily.  No evening extubation planned      GI and Nutrition :   1. Small bowel obstruction s/p ex lap, TISHA and partial small bowel resection 3/4 - with subsequent imaging suggestive of continued obstruction vs ileus   2. Ileus   3. Severe protein calorie malnutrition  4. GERD/Carlson's   Plan  - Replaced NG tube, clamped check residuals q 4 hours  -  TF started yesterday at trophic, doing well, plan to increase today  - TPN, to be weaned off later today  -  H2 blocker     Renal/Fluids/Electrolytes:   1. ?Mild BJ, slight elevation in creatinine --- resolved  2. Electrolyte abnormality - hypernatremia resolved  Plan  - adjust medications as needed for renal clearance  - follow I/O's as appropriate  - Replacement elyte as needed per protocol      Infectious Disease:   1. Aspiration pneumonitis vs pneumonia  Plan  - zosyn  7 day course for suspected aspiration PNA completed  - no abx currently       Endocrine:   1. Stress induced hyperglycemia-- resolved  Plan  - Goal sugar <180 for improved healing  - SSI      Hematology/Oncology:   1. Normocytic anemia  2. Thrombocytopenia, resolved   Plan  - Peripherally follow hb.  No s/sy active blood loss or hemodynamic instability (3x/week?)     General cares:  DVT Prophylaxis: Enoxaparin (Lovenox) SQ  GI Prophylaxis: H2 Blocker  Restraints: Restraints for medical healing needed: YES  Family update by me today: No  Current lines are required for patient management  Access: PICC placed 3/6/18     Brooklyn Maguire    Time Spent on this Encounter   Billing:  I spent 60 minutes bedside and on the inpatient unit today managing the critical care of Joseluis Falcon in relation to the issues listed in this note.    Interval History   No acute events.   Pt restful not waking to loud voice.  Unable to do ROS    Physical Exam   Temp: 97.4  F (36.3  C) Temp src: Axillary Temp  Min: 97.4  F (36.3  C)  Max: 98.8  F (37.1  C) BP: 132/59   Heart Rate: 61 Resp: 20 SpO2: 100 % O2 Device: Mechanical Ventilator    Vitals:    03/15/18 0000 03/16/18 0300 03/17/18 0000   Weight: 64.9 kg (143 lb 1.3 oz) 64.1 kg (141 lb 5 oz) 64.1 kg (141 lb 5 oz)     I/O last 3 completed shifts:  In: 3565.97 [I.V.:1065.97; NG/GT:210]  Out: 3595 [Urine:3595]    GEN: sedate  EYES: PERRL, Anicteric sclera. With exudate  HEENT:  Normocephalic, atraumatic, trachea midline, ETT secure  CV: RRR, no gallops, rubs, or murmurs  PULM/CHEST: Clear breath sounds bilaterally without rhonchi, crackles or wheeze, symmetric chest rise  GI: quiet bowel sounds, soft, non-tender, no guarding  : alba catheter in place, urine yellow and clear  EXTREMITIES: scant peripheral edema,peripheral pulses intact  NEURO: SEBASTIEN no focal deficits noted  SKIN: warm and dry and thin  PSYCH: LEANN  Imaging personally reviewed: no new  ECG: SR    Medications     propofol (DIPRIVAN) infusion 15 mcg/kg/min (03/17/18 0912)     parenteral nutrition - Clinimix E 90 mL/hr at 03/17/18 1000     IV fluid REPLACEMENT ONLY       amiodarone 0.5 mg/min (03/17/18 0827)       rantidine  150 mg Oral BID     enoxaparin  1 mg/kg Subcutaneous Q12H     ipratropium - albuterol 0.5 mg/2.5 mg/3 mL  3 mL Nebulization 4x daily     insulin aspart  1-6 Units Subcutaneous Q4H     chlorhexidine  15 mL Mouth/Throat Q12H     sodium chloride (PF)  3 mL Intracatheter Q8H     sodium chloride (PF)  10 mL Intracatheter Q8H

## 2018-03-17 NOTE — PROGRESS NOTES
[facility] ICU RESPIRATORY NOTE  Date of Admission: 2/27/18  Date of Intubation (most recent): 3/12/18 (reintubation)  Reason for Mechanical Ventilation: Acute hypercarbic respiratory failure  Number of Days on Mechanical Ventilation: 6  Reason for No Pressure Support Trial: resting for the night  Ventilation Mode: CMV/AC  (Continuous Mandatory Ventilation/ Assist Control)  FiO2 (%): 40 %  Rate Set (breaths/minute): 16 breaths/min  Tidal Volume Set (mL): 450 mL  PEEP (cm H2O): 5 cmH2O  Pressure Support (cm H2O): 8 cmH2O  Oxygen Concentration (%): 40 %  Resp: 17      Significant Events Today: none during this shift    ABG Results:  No lab results found in last 7 days.      ETT appearance on chest x-ray: good position    Plan:  Pt remains full vent support. RT will assess pt in the morning for PS trial.  3/17/2018  Santy Donohue

## 2018-03-17 NOTE — PROGRESS NOTES
"Surgery Note    Pt tolerating trophic tube feeds. Low residuals. Having bowel function    O: /59  Pulse 145  Temp 97.4  F (36.3  C) (Axillary)  Resp 20  Ht 5' 9\" (1.753 m)  Wt 141 lb 5 oz (64.1 kg)  SpO2 100%  BMI 20.87 kg/m2  Abd: soft, nt, nd, incisions look good    A/P: ok to slowly advance tube feeds to goal.     Lauryn Gaxiola MD  [facility]  [phone]      "

## 2018-03-17 NOTE — PLAN OF CARE
Problem: Patient Care Overview  Goal: Plan of Care/Patient Progress Review  Outcome: No Change  Scant residuals from OGT. Tolerating current vent settings. Sinus rhythm/Sinus mitchel overnight. Moving strongly and purposefully but not following commands at this time. Propofol titrated up to 35mcg/kg/min for agitation.

## 2018-03-17 NOTE — PLAN OF CARE
Problem: Ventilation, Mechanical Invasive (Adult)  Goal: Signs and Symptoms of Listed Potential Problems Will be Absent, Minimized or Managed (Ventilation, Mechanical Invasive)  Signs and symptoms of listed potential problems will be absent, minimized or managed by discharge/transition of care (reference Ventilation, Mechanical Invasive (Adult) CPG).   Outcome: No Change  Patient is more awake today. Precedex gtt infusing at 0.2 mcg/kg/hr. Patient  remain on the vent.CMV 12,,FIO2 40% Peep 5. O2 sat 100%. Vent weaning done 2x today. Vital signs stable.Diuresing well. Palpable pulses.Cont to monitor.Mehnaz Alonzo, RN, BSN, BC, CCRN

## 2018-03-18 LAB
ANION GAP SERPL CALCULATED.3IONS-SCNC: 5 MMOL/L (ref 3–14)
BASE EXCESS BLDV CALC-SCNC: 3.7 MMOL/L
BUN SERPL-MCNC: 33 MG/DL (ref 7–30)
CALCIUM SERPL-MCNC: 8 MG/DL (ref 8.5–10.1)
CHLORIDE SERPL-SCNC: 105 MMOL/L (ref 94–109)
CO2 SERPL-SCNC: 28 MMOL/L (ref 20–32)
CREAT SERPL-MCNC: 0.72 MG/DL (ref 0.66–1.25)
GFR SERPL CREATININE-BSD FRML MDRD: >90 ML/MIN/1.7M2
GLUCOSE BLDC GLUCOMTR-MCNC: 101 MG/DL (ref 70–99)
GLUCOSE BLDC GLUCOMTR-MCNC: 101 MG/DL (ref 70–99)
GLUCOSE BLDC GLUCOMTR-MCNC: 102 MG/DL (ref 70–99)
GLUCOSE BLDC GLUCOMTR-MCNC: 103 MG/DL (ref 70–99)
GLUCOSE BLDC GLUCOMTR-MCNC: 117 MG/DL (ref 70–99)
GLUCOSE BLDC GLUCOMTR-MCNC: 97 MG/DL (ref 70–99)
GLUCOSE BLDC GLUCOMTR-MCNC: 99 MG/DL (ref 70–99)
GLUCOSE SERPL-MCNC: 105 MG/DL (ref 70–99)
HCO3 BLDV-SCNC: 29 MMOL/L (ref 21–28)
MAGNESIUM SERPL-MCNC: 2.1 MG/DL (ref 1.6–2.3)
OXYHGB MFR BLDV: 71 %
PCO2 BLDV: 49 MM HG (ref 40–50)
PH BLDV: 7.39 PH (ref 7.32–7.43)
PO2 BLDV: 38 MM HG (ref 25–47)
POTASSIUM SERPL-SCNC: 4.4 MMOL/L (ref 3.4–5.3)
SODIUM SERPL-SCNC: 138 MMOL/L (ref 133–144)

## 2018-03-18 PROCEDURE — 00000146 ZZHCL STATISTIC GLUCOSE BY METER IP

## 2018-03-18 PROCEDURE — 25000125 ZZHC RX 250: Performed by: HOSPITALIST

## 2018-03-18 PROCEDURE — 94640 AIRWAY INHALATION TREATMENT: CPT | Mod: 76

## 2018-03-18 PROCEDURE — 80048 BASIC METABOLIC PNL TOTAL CA: CPT | Performed by: INTERNAL MEDICINE

## 2018-03-18 PROCEDURE — 25000132 ZZH RX MED GY IP 250 OP 250 PS 637: Performed by: HOSPITALIST

## 2018-03-18 PROCEDURE — 25000125 ZZHC RX 250: Performed by: PHYSICIAN ASSISTANT

## 2018-03-18 PROCEDURE — 40000275 ZZH STATISTIC RCP TIME EA 10 MIN

## 2018-03-18 PROCEDURE — 27210429 ZZH NUTRITION PRODUCT INTERMEDIATE LITER

## 2018-03-18 PROCEDURE — 99291 CRITICAL CARE FIRST HOUR: CPT | Performed by: INTERNAL MEDICINE

## 2018-03-18 PROCEDURE — 83735 ASSAY OF MAGNESIUM: CPT | Performed by: INTERNAL MEDICINE

## 2018-03-18 PROCEDURE — 40000239 ZZH STATISTIC VAT ROUNDS

## 2018-03-18 PROCEDURE — 94003 VENT MGMT INPAT SUBQ DAY: CPT

## 2018-03-18 PROCEDURE — 25000128 H RX IP 250 OP 636: Performed by: NURSE PRACTITIONER

## 2018-03-18 PROCEDURE — 20000003 ZZH R&B ICU

## 2018-03-18 PROCEDURE — 25000125 ZZHC RX 250: Performed by: NURSE PRACTITIONER

## 2018-03-18 PROCEDURE — 25000132 ZZH RX MED GY IP 250 OP 250 PS 637: Performed by: INTERNAL MEDICINE

## 2018-03-18 PROCEDURE — 82805 BLOOD GASES W/O2 SATURATION: CPT | Performed by: INTERNAL MEDICINE

## 2018-03-18 RX ORDER — GINSENG 100 MG
CAPSULE ORAL 3 TIMES DAILY
Status: DISCONTINUED | OUTPATIENT
Start: 2018-03-18 | End: 2018-03-23

## 2018-03-18 RX ADMIN — IPRATROPIUM BROMIDE AND ALBUTEROL SULFATE 3 ML: .5; 3 SOLUTION RESPIRATORY (INHALATION) at 16:10

## 2018-03-18 RX ADMIN — CHLORHEXIDINE GLUCONATE 15 ML: 1.2 RINSE ORAL at 19:54

## 2018-03-18 RX ADMIN — IPRATROPIUM BROMIDE AND ALBUTEROL SULFATE 3 ML: .5; 3 SOLUTION RESPIRATORY (INHALATION) at 07:49

## 2018-03-18 RX ADMIN — BACITRACIN 1 APPLICATOR: 500 OINTMENT TOPICAL at 17:07

## 2018-03-18 RX ADMIN — AMIODARONE HYDROCHLORIDE 400 MG: 200 TABLET ORAL at 21:35

## 2018-03-18 RX ADMIN — IPRATROPIUM BROMIDE AND ALBUTEROL SULFATE 3 ML: .5; 3 SOLUTION RESPIRATORY (INHALATION) at 20:26

## 2018-03-18 RX ADMIN — CHLORHEXIDINE GLUCONATE 15 ML: 1.2 RINSE ORAL at 08:05

## 2018-03-18 RX ADMIN — IPRATROPIUM BROMIDE AND ALBUTEROL SULFATE 3 ML: .5; 3 SOLUTION RESPIRATORY (INHALATION) at 11:06

## 2018-03-18 RX ADMIN — ENOXAPARIN SODIUM 60 MG: 60 INJECTION SUBCUTANEOUS at 18:16

## 2018-03-18 RX ADMIN — DEXMEDETOMIDINE HYDROCHLORIDE 0.6 MCG/KG/HR: 100 INJECTION, SOLUTION INTRAVENOUS at 22:45

## 2018-03-18 RX ADMIN — RANITIDINE HYDROCHLORIDE 150 MG: 150 SOLUTION ORAL at 09:40

## 2018-03-18 RX ADMIN — ENOXAPARIN SODIUM 60 MG: 60 INJECTION SUBCUTANEOUS at 06:21

## 2018-03-18 RX ADMIN — BACITRACIN: 500 OINTMENT TOPICAL at 21:41

## 2018-03-18 RX ADMIN — RANITIDINE HYDROCHLORIDE 150 MG: 150 SOLUTION ORAL at 21:35

## 2018-03-18 RX ADMIN — DEXMEDETOMIDINE HYDROCHLORIDE 0.5 MCG/KG/HR: 100 INJECTION, SOLUTION INTRAVENOUS at 08:55

## 2018-03-18 RX ADMIN — AMIODARONE HYDROCHLORIDE 400 MG: 200 TABLET ORAL at 09:40

## 2018-03-18 NOTE — PROGRESS NOTES
Date of Admission: 2/27/18  Date of Intubation (most recent): 3/12/18 (reintubation)  Reason for Mechanical Ventilation: Acute hypercarbic respiratory failure  Number of Days on Mechanical Ventilation: 7  Reason for No Pressure Support Trial: resting for the night  Ventilation Mode: CMV/AC  (Continuous Mandatory Ventilation/ Assist Control)  FiO2 (%): 40 %  Rate Set (breaths/minute): 16 breaths/min  Tidal Volume Set (mL): 450 mL  PEEP (cm H2O): 5 cmH2O  Pressure Support (cm H2O): 8 cmH2O  Oxygen Concentration (%): 40 %  Resp: 17  No lab results found in last 7 days.   Plan: continue full vent support tonight, assess for weaning readiness daily.

## 2018-03-18 NOTE — PROGRESS NOTES
Patient extubated to BiPAP 10/5, 40% without any complications. Suctioned pre and post extubation. No stridor, sat 99%. Will continue to follow.     3/18/2018  Lala Barney, RT

## 2018-03-18 NOTE — PLAN OF CARE
Problem: Restraint for Non-Violent/Non-Self-Destructive Behavior  Goal: Prevent/Manage Potential Problems  Maintain safety of patient and others during period of restraint.  Promote psychological and physical wellbeing.  Prevent injury to skin and involved body parts.  Promote nutrition, hydration, and elimination.   Outcome: Improving  Patient got extubated.Awake and following commands. Restraints discontinued.Cont to monitor.Mehnaz Alonzo RN, BSN, BC, CCRN

## 2018-03-18 NOTE — PROGRESS NOTES
Surgery Note    Pt intubated, eyes open, answers questions with head shake. No abdominal pain.   Abd: soft, incision c/d/i    A/P: tolerating tube feeds well.     Lauryn Gaxiola MD  [facility]  [phone]

## 2018-03-18 NOTE — PLAN OF CARE
Problem: Patient Care Overview  Goal: Plan of Care/Patient Progress Review  Outcome: Improving  Patient weaned from the vent and extubated. Placed on Bipap at 40% FIO2. O2 sat 100%. Stable vital signs.No resp distress noted. Patient is following commands. Placed on O2 NC AT 3L/min.O2 sat 100%. Cont to monitor.Mehnaz Alonzo RN, BSN, BC, CCRN

## 2018-03-18 NOTE — PROGRESS NOTES
Intensivist Daily Note  03/18/2018      Brief History:  Joseluis Falcon is a 85 male initially admitted for SBO on 2/27, initially managed non-operatively now s/p lysis of adhesions and partial small bowel resection on 3/4/18. Decompensated with encephalopathy and respiratory failure night of 3/6, intubated and transferred to ICU. Extubated 3/11 and re-intubated 3/12 after increased WOB. Ongoing issues with prolonged ileus and inability to wean from ventilator.     Interval Events:  3/18: continues on PST trials with eye towards extubation.  Passed initial trial yesterday but failed second trial.  Tolerating tube feeds.  Denies complaints this morning, is anxious for a trial of extubation.    Allergy:  Allergies   Allergen Reactions     Seasonal Allergies      Protonix [Pantoprazole] Rash        Medications:  Current Facility-Administered Medications   Medication     dexmedetomidine (PRECEDEX) 400 mcg in sodium chloride 0.9 % 100 mL infusion     amiodarone (PACERONE/CODARONE) tablet 400 mg    Followed by     [START ON 3/21/2018] amiodarone (PACERONE/CODARONE) tablet 200 mg    Followed by     [START ON 3/23/2018] amiodarone (PACERONE/CODARONE) tablet 200 mg     ranitidine (Zantac) syrup 150 mg     acetaminophen (TYLENOL) tablet 650 mg     enoxaparin (LOVENOX) injection 60 mg     hydrALAZINE (APRESOLINE) injection 10 mg     propofol (DIPRIVAN) infusion     ipratropium - albuterol 0.5 mg/2.5 mg/3 mL (DUONEB) neb solution 3 mL     haloperidol lactate (HALDOL) injection 2 mg     insulin aspart (NovoLOG) inj (RAPID ACTING)     naloxone (NARCAN) injection 0.1-0.4 mg     chlorhexidine (PERIDEX) 0.12 % solution 15 mL     dextrose 10 % 1,000 mL infusion     glucose 40 % gel 15-30 g    Or     dextrose 50 % injection 25-50 mL    Or     glucagon injection 1 mg     lidocaine 1 % 1 mL     lidocaine (LMX4) cream     sodium chloride (PF) 0.9% PF flush 3 mL     sodium chloride (PF) 0.9% PF flush 3 mL     nitroGLYcerin (NITROSTAT)  sublingual tablet 0.4 mg     sodium chloride (PF) 0.9% PF flush 10-20 mL     sodium chloride (PF) 0.9% PF flush 10 mL     artificial saliva (BIOTENE MT) spray 1 spray     magnesium sulfate 4 g in 100 mL sterile water (premade)     HYDROmorphone (PF) (DILAUDID) injection 0.2 mg     ondansetron (ZOFRAN) injection 4 mg     prochlorperazine (COMPAZINE) injection 5 mg         Physical examination:  Vital signs:  Temp: 97.9  F (36.6  C) Temp src: Axillary BP: 101/51   Heart Rate: 56 Resp: 15 SpO2: 100 % O2 Device: Mechanical Ventilator      General: Sedated.  not in distress, follows commands.  HEENT: neck supple, symmetrical  Lungs: Clear to auscultation, no wheezing  CVS: Normal S1 & S2, no murmur  Abdomen: Bowel sounds present, soft, non tender  Extremities/musculoskeletal: trace edema  Neurology: no focal deficits  Skin: no rash  Exam of Line sites: No erythema or discharge.    Data:    ROUTINE ICU LABS (Last four results)  CMP  Recent Labs  Lab 03/18/18  0434 03/17/18  1055 03/17/18  0950 03/17/18  0445  03/12/18  0220    138 Canceled, Test credited Canceled, Test credited  < > 144   POTASSIUM 4.4 4.5 Canceled, Test credited Canceled, Test credited  < > 4.0   CHLORIDE 105 105 Canceled, Test credited Canceled, Test credited  < > 113*   CO2 28 28 Canceled, Test credited Canceled, Test credited  < > 27   ANIONGAP 5 5 Canceled, Test credited Canceled, Test credited  < > 4   * 117* Canceled, Test credited Canceled, Test credited  < > 110*   BUN 33* 31* Canceled, Test credited Canceled, Test credited  < > 26   CR 0.72 0.73 Canceled, Test credited Canceled, Test credited  < > 0.92   GFRESTIMATED >90 >90 Canceled, Test credited Canceled, Test credited  < > 78   GFRESTBLACK >90 >90 Canceled, Test credited Canceled, Test credited  < > >90   TOBI 8.0* 8.1* Canceled, Test credited Canceled, Test credited  < > 7.8*   MAG 2.1 2.2 Canceled, Test credited Canceled, Test credited  < > 2.3   PHOS  --  3.1 Canceled, Test  "credited Canceled, Test credited  --  2.8   PROTTOTAL  --   --   --   --   --  5.6*   ALBUMIN  --   --   --   --   --  1.8*   BILITOTAL  --   --   --   --   --  0.7   ALKPHOS  --   --   --   --   --  61   AST  --   --   --   --   --  28   ALT  --   --   --   --   --  22   < > = values in this interval not displayed.  CBC  Recent Labs  Lab 03/17/18  0445 03/16/18  0320 03/15/18  0407 03/14/18  0405   WBC 7.2 6.1 6.8 4.9   RBC 2.83* 2.87* 3.09* 3.63*   HGB 9.1* 9.1* 9.8* 11.6*   HCT 28.6* 28.9* 31.0* 36.2*   * 101* 100 100   MCH 32.2 31.7 31.7 32.0   MCHC 31.8 31.5 31.6 32.0   RDW 13.5 13.5 13.4 13.3    289 295 202         Assessment and Plan:    Neuro:  1. Encephalopathy, mixed metabolic with delirium componant  Plan  -off dex 3/18; doing well.  -sedation vacation during PST daily  -prn dilaudid + tylenol      Cardiovascular:   1. Atrial fibrillation  (RVR resolved, currently in sinus)  Plan  - BP goal MAP >65, no pressors   - EP consulted earlier, now back in sinus rhythm   +Prev. On amio gtt transitioned to PO with \"wean to off\" schedule -- if he has more issue with AFib, will prob need to continue indefinitely  - Now on sq lovenox treatment dosing for ease of administration.  Long term anticoagulation to be determined at a later date.       Pulmonary:  1. Acute hypercarbic respiratory failure -first respiratory failure in setting of recent surgery, likely aspiration with evidence of REJI physiology on initial eval. Extubated 3/11 and re-intubated 3/12 after increased WOB and failed BiPAP trial.   2. Aspiration pneumonia  3. Mild emphysema/fibrosis - ? Age related   Plan  -Extubated 3/11, re-intubated overnight 3/12. Increased WOB and poor air movement.   -Pulmonary toilet   -Scheduled duonebs   -PST daily.  No evening extubation planned  -Anticipate trial of extubation to NIPPV today      GI and Nutrition:  1. Small bowel obstruction s/p ex lap, TISHA and partial small bowel resection 3/4 - with " subsequent imaging suggestive of continued obstruction vs ileus   2. Ileus   3. Severe protein calorie malnutrition  4. GERD/Carlson's   Plan  -Replaced NG tube, clamped check residuals q 4 hours  -On TF started at trophic, doing well, increase as tolerated  -TPN weaned off  - H2 blocker      Renal/Fluids/Electrolytes:   1. ?Mild BJ, slight elevation in creatinine --- resolved  2. Electrolyte abnormality - hypernatremia resolved  Plan  - adjust medications as needed for renal clearance  - follow I/O's as appropriate  - Replacement elyte as needed per protocol      Infectious Disease:   1. Aspiration pneumonitis vs pneumonia  Plan  - zosyn 7 day course for suspected aspiration PNA completed  - no abx currently       Endocrine:   1. Stress induced hyperglycemia-- resolved  Plan  - Goal sugar <180 for improved healing  - SSI      Hematology/Oncology:   1. Normocytic anemia  2. Thrombocytopenia, resolved   Plan  -follow hgb; transfuse for < 7      General cares:  DVT Prophylaxis: Enoxaparin (Lovenox) SQ  GI Prophylaxis: H2 Blocker  Restraints: Restraints for medical healing needed: YES  Family update by me today: No  Current lines are required for patient management  Access: PICC placed 3/6/18      Billing: Critical care time 40 min excluding procedure time.      Hanh Leo MD  Pulmonary and Critical Care Medicine

## 2018-03-18 NOTE — PROGRESS NOTES
Patient is awake and following commands. Up in chair. Vent weaning in progress. Patient's own tidal volume is 400's to 500's.O2 sat 100% on pressure support 8 and Peep 5  FIO2 40%. No resp distress noted.Vital signs stable.Updated Dr Leo. Venous gas sent to lab as ordered. Cont to monitor.Mehnaz Alonzo RN, BSN, BC, CCRN

## 2018-03-18 NOTE — PLAN OF CARE
Problem: Surgery Nonspecified (Adult)  Goal: Signs and Symptoms of Listed Potential Problems Will be Absent, Minimized or Managed (Surgery Nonspecified)  Signs and symptoms of listed potential problems will be absent, minimized or managed by discharge/transition of care (reference Surgery Nonspecified (Adult) CPG).   Outcome: No Change  Pt restless on vent. Denies pain. precidex titrated for ordered sedation goals. Pt has a strong cough with cloudy secretions. VSS. Pt remains in SR. TPN stopped and TF titrating to goal. Will continue to monitor.

## 2018-03-19 ENCOUNTER — APPOINTMENT (OUTPATIENT)
Dept: OCCUPATIONAL THERAPY | Facility: CLINIC | Age: 83
DRG: 329 | End: 2018-03-19
Payer: COMMERCIAL

## 2018-03-19 ENCOUNTER — APPOINTMENT (OUTPATIENT)
Dept: PHYSICAL THERAPY | Facility: CLINIC | Age: 83
DRG: 329 | End: 2018-03-19
Payer: COMMERCIAL

## 2018-03-19 LAB
ALBUMIN SERPL-MCNC: 1.9 G/DL (ref 3.4–5)
ALP SERPL-CCNC: 176 U/L (ref 40–150)
ALT SERPL W P-5'-P-CCNC: 44 U/L (ref 0–70)
ANION GAP SERPL CALCULATED.3IONS-SCNC: 3 MMOL/L (ref 3–14)
AST SERPL W P-5'-P-CCNC: 35 U/L (ref 0–45)
BILIRUB SERPL-MCNC: 0.4 MG/DL (ref 0.2–1.3)
BUN SERPL-MCNC: 35 MG/DL (ref 7–30)
CALCIUM SERPL-MCNC: 7.9 MG/DL (ref 8.5–10.1)
CHLORIDE SERPL-SCNC: 107 MMOL/L (ref 94–109)
CO2 SERPL-SCNC: 29 MMOL/L (ref 20–32)
CREAT SERPL-MCNC: 0.72 MG/DL (ref 0.66–1.25)
GFR SERPL CREATININE-BSD FRML MDRD: >90 ML/MIN/1.7M2
GLUCOSE BLDC GLUCOMTR-MCNC: 101 MG/DL (ref 70–99)
GLUCOSE BLDC GLUCOMTR-MCNC: 101 MG/DL (ref 70–99)
GLUCOSE BLDC GLUCOMTR-MCNC: 105 MG/DL (ref 70–99)
GLUCOSE BLDC GLUCOMTR-MCNC: 111 MG/DL (ref 70–99)
GLUCOSE BLDC GLUCOMTR-MCNC: 112 MG/DL (ref 70–99)
GLUCOSE BLDC GLUCOMTR-MCNC: 113 MG/DL (ref 70–99)
GLUCOSE BLDC GLUCOMTR-MCNC: 90 MG/DL (ref 70–99)
GLUCOSE SERPL-MCNC: 112 MG/DL (ref 70–99)
INR PPP: 1.14 (ref 0.86–1.14)
LACTATE BLD-SCNC: 0.8 MMOL/L (ref 0.7–2)
MAGNESIUM SERPL-MCNC: 2 MG/DL (ref 1.6–2.3)
PHOSPHATE SERPL-MCNC: 2.4 MG/DL (ref 2.5–4.5)
POTASSIUM SERPL-SCNC: 4.5 MMOL/L (ref 3.4–5.3)
PREALB SERPL IA-MCNC: 14 MG/DL (ref 15–45)
PROT SERPL-MCNC: 5.8 G/DL (ref 6.8–8.8)
SODIUM SERPL-SCNC: 139 MMOL/L (ref 133–144)
TRIGL SERPL-MCNC: 34 MG/DL

## 2018-03-19 PROCEDURE — 25000125 ZZHC RX 250: Performed by: INTERNAL MEDICINE

## 2018-03-19 PROCEDURE — 97110 THERAPEUTIC EXERCISES: CPT | Mod: GO

## 2018-03-19 PROCEDURE — 00000146 ZZHCL STATISTIC GLUCOSE BY METER IP

## 2018-03-19 PROCEDURE — 40000133 ZZH STATISTIC OT WARD VISIT

## 2018-03-19 PROCEDURE — 94640 AIRWAY INHALATION TREATMENT: CPT

## 2018-03-19 PROCEDURE — 25000125 ZZHC RX 250: Performed by: NURSE PRACTITIONER

## 2018-03-19 PROCEDURE — 40000915 ZZH STATISTIC SITTER, EVENING HOURS

## 2018-03-19 PROCEDURE — 25000128 H RX IP 250 OP 636: Performed by: NURSE PRACTITIONER

## 2018-03-19 PROCEDURE — 40000193 ZZH STATISTIC PT WARD VISIT: Performed by: PHYSICAL THERAPIST

## 2018-03-19 PROCEDURE — 40000275 ZZH STATISTIC RCP TIME EA 10 MIN

## 2018-03-19 PROCEDURE — 84134 ASSAY OF PREALBUMIN: CPT | Performed by: INTERNAL MEDICINE

## 2018-03-19 PROCEDURE — 25000125 ZZHC RX 250

## 2018-03-19 PROCEDURE — 84100 ASSAY OF PHOSPHORUS: CPT | Performed by: INTERNAL MEDICINE

## 2018-03-19 PROCEDURE — 84478 ASSAY OF TRIGLYCERIDES: CPT | Performed by: INTERNAL MEDICINE

## 2018-03-19 PROCEDURE — 93005 ELECTROCARDIOGRAM TRACING: CPT

## 2018-03-19 PROCEDURE — 25000128 H RX IP 250 OP 636: Performed by: INTERNAL MEDICINE

## 2018-03-19 PROCEDURE — 27210429 ZZH NUTRITION PRODUCT INTERMEDIATE LITER

## 2018-03-19 PROCEDURE — 25000125 ZZHC RX 250: Performed by: PHYSICIAN ASSISTANT

## 2018-03-19 PROCEDURE — 94640 AIRWAY INHALATION TREATMENT: CPT | Mod: 76

## 2018-03-19 PROCEDURE — 97530 THERAPEUTIC ACTIVITIES: CPT | Mod: GP | Performed by: PHYSICAL THERAPIST

## 2018-03-19 PROCEDURE — 83735 ASSAY OF MAGNESIUM: CPT | Performed by: INTERNAL MEDICINE

## 2018-03-19 PROCEDURE — 97116 GAIT TRAINING THERAPY: CPT | Mod: GP | Performed by: PHYSICAL THERAPIST

## 2018-03-19 PROCEDURE — 99233 SBSQ HOSP IP/OBS HIGH 50: CPT | Performed by: INTERNAL MEDICINE

## 2018-03-19 PROCEDURE — 85610 PROTHROMBIN TIME: CPT | Performed by: INTERNAL MEDICINE

## 2018-03-19 PROCEDURE — 25000132 ZZH RX MED GY IP 250 OP 250 PS 637: Performed by: HOSPITALIST

## 2018-03-19 PROCEDURE — 93010 ELECTROCARDIOGRAM REPORT: CPT | Performed by: INTERNAL MEDICINE

## 2018-03-19 PROCEDURE — 83605 ASSAY OF LACTIC ACID: CPT | Performed by: INTERNAL MEDICINE

## 2018-03-19 PROCEDURE — 40000239 ZZH STATISTIC VAT ROUNDS

## 2018-03-19 PROCEDURE — 25000132 ZZH RX MED GY IP 250 OP 250 PS 637: Performed by: INTERNAL MEDICINE

## 2018-03-19 PROCEDURE — 80053 COMPREHEN METABOLIC PANEL: CPT | Performed by: INTERNAL MEDICINE

## 2018-03-19 PROCEDURE — 20000003 ZZH R&B ICU

## 2018-03-19 PROCEDURE — 40000914 ZZH STATISTIC SITTER, DAY HOURS

## 2018-03-19 RX ORDER — METOPROLOL TARTRATE 1 MG/ML
INJECTION, SOLUTION INTRAVENOUS
Status: COMPLETED
Start: 2018-03-19 | End: 2018-03-19

## 2018-03-19 RX ORDER — METOPROLOL TARTRATE 1 MG/ML
5 INJECTION, SOLUTION INTRAVENOUS ONCE
Status: COMPLETED | OUTPATIENT
Start: 2018-03-19 | End: 2018-03-19

## 2018-03-19 RX ORDER — QUETIAPINE FUMARATE 25 MG/1
50 TABLET, FILM COATED ORAL 2 TIMES DAILY
Status: DISCONTINUED | OUTPATIENT
Start: 2018-03-19 | End: 2018-03-20

## 2018-03-19 RX ORDER — HALOPERIDOL 5 MG/ML
2 INJECTION INTRAMUSCULAR EVERY 6 HOURS PRN
Status: DISCONTINUED | OUTPATIENT
Start: 2018-03-19 | End: 2018-03-24

## 2018-03-19 RX ORDER — HALOPERIDOL 5 MG/ML
2 INJECTION INTRAMUSCULAR EVERY 6 HOURS
Status: DISCONTINUED | OUTPATIENT
Start: 2018-03-19 | End: 2018-03-19

## 2018-03-19 RX ADMIN — ENOXAPARIN SODIUM 60 MG: 60 INJECTION SUBCUTANEOUS at 17:22

## 2018-03-19 RX ADMIN — RANITIDINE HYDROCHLORIDE 150 MG: 150 SOLUTION ORAL at 20:40

## 2018-03-19 RX ADMIN — IPRATROPIUM BROMIDE AND ALBUTEROL SULFATE 3 ML: .5; 3 SOLUTION RESPIRATORY (INHALATION) at 16:31

## 2018-03-19 RX ADMIN — METOPROLOL TARTRATE 5 MG: 5 INJECTION, SOLUTION INTRAVENOUS at 17:35

## 2018-03-19 RX ADMIN — AMIODARONE HYDROCHLORIDE 400 MG: 200 TABLET ORAL at 20:15

## 2018-03-19 RX ADMIN — QUETIAPINE FUMARATE 50 MG: 25 TABLET ORAL at 20:15

## 2018-03-19 RX ADMIN — AMIODARONE HYDROCHLORIDE 400 MG: 200 TABLET ORAL at 08:16

## 2018-03-19 RX ADMIN — IPRATROPIUM BROMIDE AND ALBUTEROL SULFATE 3 ML: .5; 3 SOLUTION RESPIRATORY (INHALATION) at 20:09

## 2018-03-19 RX ADMIN — DEXMEDETOMIDINE HYDROCHLORIDE 0.6 MCG/KG/HR: 100 INJECTION, SOLUTION INTRAVENOUS at 09:11

## 2018-03-19 RX ADMIN — VALPROATE SODIUM 500 MG: 100 INJECTION, SOLUTION INTRAVENOUS at 20:40

## 2018-03-19 RX ADMIN — IPRATROPIUM BROMIDE AND ALBUTEROL SULFATE 3 ML: .5; 3 SOLUTION RESPIRATORY (INHALATION) at 12:11

## 2018-03-19 RX ADMIN — SODIUM CHLORIDE, POTASSIUM CHLORIDE, SODIUM LACTATE AND CALCIUM CHLORIDE 500 ML: 600; 310; 30; 20 INJECTION, SOLUTION INTRAVENOUS at 23:26

## 2018-03-19 RX ADMIN — HALOPERIDOL LACTATE 2 MG: 5 INJECTION, SOLUTION INTRAMUSCULAR at 10:20

## 2018-03-19 RX ADMIN — VALPROATE SODIUM 500 MG: 100 INJECTION, SOLUTION INTRAVENOUS at 10:21

## 2018-03-19 RX ADMIN — BACITRACIN: 500 OINTMENT TOPICAL at 08:17

## 2018-03-19 RX ADMIN — BACITRACIN: 500 OINTMENT TOPICAL at 16:24

## 2018-03-19 RX ADMIN — QUETIAPINE FUMARATE 50 MG: 25 TABLET ORAL at 12:25

## 2018-03-19 RX ADMIN — RANITIDINE HYDROCHLORIDE 150 MG: 150 SOLUTION ORAL at 08:16

## 2018-03-19 RX ADMIN — METOPROLOL TARTRATE 5 MG: 5 INJECTION, SOLUTION INTRAVENOUS at 18:16

## 2018-03-19 RX ADMIN — ENOXAPARIN SODIUM 60 MG: 60 INJECTION SUBCUTANEOUS at 06:07

## 2018-03-19 RX ADMIN — IPRATROPIUM BROMIDE AND ALBUTEROL SULFATE 3 ML: .5; 3 SOLUTION RESPIRATORY (INHALATION) at 08:19

## 2018-03-19 RX ADMIN — METOPROLOL TARTRATE 5 MG: 1 INJECTION, SOLUTION INTRAVENOUS at 18:16

## 2018-03-19 RX ADMIN — BACITRACIN: 500 OINTMENT TOPICAL at 22:39

## 2018-03-19 NOTE — PROGRESS NOTES
[facility]    Critical Care Service  Progress Note    Date of Service (when I saw the patient): 03/19/2018     Main Plans for Today   Kiran CASTELLANOS    Problem List   SBO s/p ex lap, TISHA and partial small bowel resection 3/4  A-fib with RVR--now in sinus  Respiratory failure s/p multiple re-intubations, now extubated  Aspiration   Protein calorie malnutrition, on TPN   Pulmonary fibrosis     Assessment & Plan   Joseluis Falcon is a 85 male initially admitted for SBO on 2/27, initially managed non-operatively now s/p lysis of adhesions and partial small bowel resection on 3/4/18. Decompensated with encephalopathy and respiratory failure night of 3/6, intubated and transferred to ICU. Extubated 3/11 and re-intubated 3/12 after increased WOB. Ongoing issues with prolonged ileus and inability to wean from ventilator.       Neurology/Psychiatry:   1. Encephalopathy, mixed metabolic with delirium componant.  Seems to be mostly delirium now.  Plan  - prn haldol q6 (last qtc ok on 3/7).  Recheck EKG.  Adding seroquel and valproate load.  Wean precedex as tolerated.  - prn dilaudid  / tylenol      Cardiovascular:   1. Atrial fibrillation  ( RVR resolved, currently in sinus)  Plan  - BP goal MAP >65, no pressors   - EP consulted earlier,. now back in sinus rhythm.  Amio drip increased back up Friday 2nd another bout of afib.  Now transitioned to PO amiodarone.  - Now on sq lovenox treatment dosing for ease of administration.  Long term anticoagulation to be determined at a later date.       Pulmonary/Ventilator Management:   1. Acute hypercarbic respiratory failure -first respiratory failure in setting of recent surgery, likely aspiration with evidence of REJI physiology on initial eval. Extubated 3/11 and re-intubated 3/12 after increased WOB and failed BiPAP trial. Now re-extubated 3/18.  2. Aspiration pneumonia  3. Mild emphysema/fibrosis - ? Age related   Plan  - Extubated 3/11, re-intubated overnight  3/12. Now re-extubated 3/18.  - Pulmonary toilet   - Scheduled duonebs   - OOB to chair      GI and Nutrition :   1. Small bowel obstruction s/p ex lap, TISHA and partial small bowel resection 3/4 - with subsequent imaging suggestive of continued obstruction vs ileus   2. Ileus   3. Severe protein calorie malnutrition  4. GERD/Carlson's   Plan  - Replaced NG tube, clamped check residuals q 4 hours  -  TF at trophic, doing well, increase as tolerated.  - TPN, weaned off  -  H2 blocker     Renal/Fluids/Electrolytes:   1. ?Mild BJ, slight elevation in creatinine --- resolved  2. Electrolyte abnormality - hypernatremia resolved  Plan  - adjust medications as needed for renal clearance  - follow I/O's as appropriate  - Replacement elyte as needed per protocol      Infectious Disease:   1. Aspiration pneumonitis vs pneumonia  Plan  - zosyn  7 day course for suspected aspiration PNA completed  - no abx currently       Endocrine:   1. Stress induced hyperglycemia-- resolved  Plan  - Goal sugar <180 for improved healing  - SSI      Hematology/Oncology:   1. Normocytic anemia  2. Thrombocytopenia, resolved   Plan  - Peripherally follow hb.  No s/sy active blood loss or hemodynamic instability (3x/week?)     General cares:  DVT Prophylaxis: Enoxaparin (Lovenox) SQ (therapeutic for AF)  GI Prophylaxis: H2 Blocker  Restraints: Restraints for medical healing needed: YES  Family update by me today: No  Current lines are required for patient management  Access: PICC placed 3/6/18     Roverto Abreu    Interval History   No acute events.  Breathing comfortably but quite delerious this AM.  History limited by this.      Physical Exam   Temp: 99  F (37.2  C) Temp src: Axillary Temp  Min: 97.5  F (36.4  C)  Max: 99  F (37.2  C) BP: 109/52   Heart Rate: 59 Resp: 24 SpO2: 96 % O2 Device: None (Room air) Oxygen Delivery: 2 LPM  Vitals:    03/17/18 0000 03/18/18 0400 03/19/18 0400   Weight: 64.1 kg (141 lb 5 oz) 63.8 kg (140 lb 10.5 oz)  63.4 kg (139 lb 12.4 oz)       Intake/Output Summary (Last 24 hours) at 03/19/18 0933  Last data filed at 03/19/18 0819   Gross per 24 hour   Intake          2136.35 ml   Output             1610 ml   Net           526.35 ml     GEN: awake, alert, delirious/picking at lines/tubes.  EYES: PERRL, Anicteric sclera.  HEENT:  Normocephalic, atraumatic, trachea midline  CV: RRR, no gallops, rubs, or murmurs  PULM/CHEST: Clear breath sounds bilaterally without rhonchi, crackles or wheeze, symmetric chest rise.  Unlabored.  GI: quiet bowel sounds, soft, non-tender, no guarding  : alba catheter in place, urine yellow and clear  EXTREMITIES: scant peripheral edema,peripheral pulses intact  NEURO: SEBASTIEN no focal deficits noted  SKIN: warm and dry and thin  PSYCH: LEANN  Imaging personally reviewed: no new  ECG: SR    Medications     dexmedetomidine 0.6 mcg/kg/hr (03/18/18 2245)     propofol (DIPRIVAN) infusion Stopped (03/17/18 1149)     IV fluid REPLACEMENT ONLY         bacitracin   Topical TID     amiodarone  400 mg Oral or FT or NG tube BID    Followed by     [START ON 3/21/2018] amiodarone  200 mg Oral or FT or NG tube BID    Followed by     [START ON 3/23/2018] amiodarone  200 mg Oral or FT or NG tube Daily     rantidine  150 mg Oral BID     enoxaparin  1 mg/kg Subcutaneous Q12H     ipratropium - albuterol 0.5 mg/2.5 mg/3 mL  3 mL Nebulization 4x daily     insulin aspart  1-6 Units Subcutaneous Q4H     chlorhexidine  15 mL Mouth/Throat Q12H     sodium chloride (PF)  3 mL Intracatheter Q8H     sodium chloride (PF)  10 mL Intracatheter Q8H     Labs (personally reviewed/interpreted):  Lytes ok.  Creatinine stable/acceptable 0.72.  Albumin low 1.9.      EKG 3/7 with qtc 0.473.

## 2018-03-19 NOTE — PLAN OF CARE
Problem: Bowel Obstruction (Adult)  Goal: Signs and Symptoms of Listed Potential Problems Will be Absent, Minimized or Managed (Bowel Obstruction)  Signs and symptoms of listed potential problems will be absent, minimized or managed by discharge/transition of care (reference Bowel Obstruction (Adult) CPG).   Outcome: Improving  Pt confused overnight. Pt became restless, attempting to get out of bed and pulling at lines. Precedex continued. Pt tolerating 2LNC. Will continue to monitor.

## 2018-03-19 NOTE — PLAN OF CARE
Problem: Patient Care Overview  Goal: Plan of Care/Patient Progress Review  Discharge Planner PT   Patient plan for discharge: None stated.  Current status:Needs frequent re-orientation to place, time and situation. Tactile and verbal cues to stay on task, for sequence and technique with all mobility. Rolling Min A, sup>sit Mod A x 2, sit<>stand Min A. Able to side step with Min A for balance.   Barriers to return to prior living situation: Limited by fatigue, confusion, decreased activity tolerance, balance and strength. Fall risk.  Recommendations for discharge: TCU  Rationale for recommendations: Pt will benefit from continued skilled rehab to progress functional independence and safety.       Entered by: Kiersten Degroot 03/19/2018 10:29 AM

## 2018-03-19 NOTE — PROGRESS NOTES
CLINICAL NUTRITION SERVICES - REASSESSMENT NOTE      Malnutrition: (3/6)  % Weight Loss:  Unable to determine without recent history  % Intake:  </= 50% for >/= 5 days (severe malnutrition)  Subcutaneous Fat Loss:  Orbital region moderate depletion and Upper arm region moderate depletion  Muscle Loss:  Temporal region severe depletion and Clavicle bone region severe depletion  Fluid Retention:  None noted      Malnutrition Diagnosis: Severe malnutrition  In Context of:  Acute illness or injury  Environmental or social circumstances        EVALUATION OF PROGRESS TOWARD GOALS   Diet:  NPO    Nutrition Support:  TPN d/c'd 3/17  TF via ND tube: Promote with Fiber at goal of 80 mL/hr = 1920 kcal (30 kcal/kg), 120 g protein (1.9 g/kg), 265 g CHO, 27 g fiber, 1594 mL H2O  Std H2O flushes at 30 mL q 4 hours.    Intake/Tolerance: No issues. Pt had BM 3/18 and again today. -112, on Med SSI.  Labs noted and acceptable (Phos slightly decreased at 2.4)  BGM <150 on Med SSI      Dosing Weight:  63.9 (3/8 wt)    ASSESSED NUTRITION NEEDS:  Energy needs: 6589-4400 kcals (30-35 kcal/kg) - malnourished, vented  Protein Needs:   grams protein (1.5-2.0 g pro/Kg) - Repletion and hypercatabolism with acute illness, possible CRF    Previous Goals (3/15):   Patient will transition from TPN to TF within the next 3-4 days    Evaluation: Met    Previous Nutrition Diagnosis:   None identified      CURRENT NUTRITION DIAGNOSIS  No nutrition diagnosis identified at this time     INTERVENTIONS  Recommendations / Nutrition Prescription  Continue TF and H2O flushes as above    Implementation  No interventions at this time    Goals  Promote with Fiber at goal of 80 mL/hr will continue to meet % of needs      MONITORING AND EVALUATION:  Progress towards goals will be monitored and evaluated per protocol and Practice Guidelines    Pearl Seay RD  Pager [phone] (M-F)            [phone] (W/E & Hol)

## 2018-03-19 NOTE — PLAN OF CARE
Problem: Patient Care Overview  Goal: Plan of Care/Patient Progress Review  Remains very confused to situation, time, place and person. Started seroquel, haldol and valproic acid scheduled which did help and able to come off precedex gtt for a few hrs but around 5pm became increasingly agitated precedex gtt restarted. Sitter remains. Remains on 2L NC sounds congested cannot cough. Was NSR all day until around 530pm HR up to 130s in and out of Afib gave a total of 10mg Lopressor IV which brought heart rate down some. Dr Lizarraga aware and is ok with HR less than 120. Updated daughter and wife on phone today of care plan.

## 2018-03-20 ENCOUNTER — APPOINTMENT (OUTPATIENT)
Dept: PHYSICAL THERAPY | Facility: CLINIC | Age: 83
DRG: 329 | End: 2018-03-20
Payer: COMMERCIAL

## 2018-03-20 LAB
ANION GAP SERPL CALCULATED.3IONS-SCNC: 7 MMOL/L (ref 3–14)
BASE EXCESS BLDV CALC-SCNC: 5.9 MMOL/L
BUN SERPL-MCNC: 31 MG/DL (ref 7–30)
CALCIUM SERPL-MCNC: 7.6 MG/DL (ref 8.5–10.1)
CHLORIDE SERPL-SCNC: 104 MMOL/L (ref 94–109)
CO2 SERPL-SCNC: 29 MMOL/L (ref 20–32)
CREAT SERPL-MCNC: 0.68 MG/DL (ref 0.66–1.25)
ERYTHROCYTE [DISTWIDTH] IN BLOOD BY AUTOMATED COUNT: 13.1 % (ref 10–15)
GFR SERPL CREATININE-BSD FRML MDRD: >90 ML/MIN/1.7M2
GLUCOSE BLDC GLUCOMTR-MCNC: 105 MG/DL (ref 70–99)
GLUCOSE BLDC GLUCOMTR-MCNC: 112 MG/DL (ref 70–99)
GLUCOSE BLDC GLUCOMTR-MCNC: 115 MG/DL (ref 70–99)
GLUCOSE BLDC GLUCOMTR-MCNC: 126 MG/DL (ref 70–99)
GLUCOSE SERPL-MCNC: 110 MG/DL (ref 70–99)
HCO3 BLDV-SCNC: 31 MMOL/L (ref 21–28)
HCT VFR BLD AUTO: 30.9 % (ref 40–53)
HGB BLD-MCNC: 10 G/DL (ref 13.3–17.7)
MAGNESIUM SERPL-MCNC: 1.9 MG/DL (ref 1.6–2.3)
MCH RBC QN AUTO: 31.8 PG (ref 26.5–33)
MCHC RBC AUTO-ENTMCNC: 32.4 G/DL (ref 31.5–36.5)
MCV RBC AUTO: 98 FL (ref 78–100)
PCO2 BLDV: 48 MM HG (ref 40–50)
PH BLDV: 7.42 PH (ref 7.32–7.43)
PLATELET # BLD AUTO: 245 10E9/L (ref 150–450)
PO2 BLDV: 36 MM HG (ref 25–47)
POTASSIUM SERPL-SCNC: 4.3 MMOL/L (ref 3.4–5.3)
RBC # BLD AUTO: 3.14 10E12/L (ref 4.4–5.9)
SODIUM SERPL-SCNC: 140 MMOL/L (ref 133–144)
WBC # BLD AUTO: 8.4 10E9/L (ref 4–11)

## 2018-03-20 PROCEDURE — 99207 ZZC CDG-CODE CATEGORY CHANGED: CPT | Performed by: INTERNAL MEDICINE

## 2018-03-20 PROCEDURE — 25000128 H RX IP 250 OP 636: Performed by: NURSE PRACTITIONER

## 2018-03-20 PROCEDURE — 25000128 H RX IP 250 OP 636: Performed by: INTERNAL MEDICINE

## 2018-03-20 PROCEDURE — G0463 HOSPITAL OUTPT CLINIC VISIT: HCPCS

## 2018-03-20 PROCEDURE — 25000132 ZZH RX MED GY IP 250 OP 250 PS 637: Performed by: HOSPITALIST

## 2018-03-20 PROCEDURE — 85027 COMPLETE CBC AUTOMATED: CPT | Performed by: INTERNAL MEDICINE

## 2018-03-20 PROCEDURE — 94640 AIRWAY INHALATION TREATMENT: CPT

## 2018-03-20 PROCEDURE — 99233 SBSQ HOSP IP/OBS HIGH 50: CPT | Performed by: INTERNAL MEDICINE

## 2018-03-20 PROCEDURE — 00000146 ZZHCL STATISTIC GLUCOSE BY METER IP

## 2018-03-20 PROCEDURE — 25000125 ZZHC RX 250: Performed by: INTERNAL MEDICINE

## 2018-03-20 PROCEDURE — 12000007 ZZH R&B INTERMEDIATE

## 2018-03-20 PROCEDURE — 83735 ASSAY OF MAGNESIUM: CPT | Performed by: INTERNAL MEDICINE

## 2018-03-20 PROCEDURE — 27210429 ZZH NUTRITION PRODUCT INTERMEDIATE LITER

## 2018-03-20 PROCEDURE — 94640 AIRWAY INHALATION TREATMENT: CPT | Mod: 76

## 2018-03-20 PROCEDURE — 40000275 ZZH STATISTIC RCP TIME EA 10 MIN

## 2018-03-20 PROCEDURE — 80048 BASIC METABOLIC PNL TOTAL CA: CPT | Performed by: INTERNAL MEDICINE

## 2018-03-20 PROCEDURE — 40000257 ZZH STATISTIC CONSULT NO CHARGE VASC ACCESS

## 2018-03-20 PROCEDURE — 25000132 ZZH RX MED GY IP 250 OP 250 PS 637: Performed by: INTERNAL MEDICINE

## 2018-03-20 PROCEDURE — 40000239 ZZH STATISTIC VAT ROUNDS

## 2018-03-20 PROCEDURE — 82803 BLOOD GASES ANY COMBINATION: CPT | Performed by: HOSPITALIST

## 2018-03-20 PROCEDURE — 97530 THERAPEUTIC ACTIVITIES: CPT | Mod: GP | Performed by: PHYSICAL THERAPIST

## 2018-03-20 PROCEDURE — 25000125 ZZHC RX 250: Performed by: PHYSICIAN ASSISTANT

## 2018-03-20 PROCEDURE — 40000193 ZZH STATISTIC PT WARD VISIT: Performed by: PHYSICAL THERAPIST

## 2018-03-20 RX ORDER — QUETIAPINE FUMARATE 25 MG/1
25 TABLET, FILM COATED ORAL 2 TIMES DAILY
Status: DISCONTINUED | OUTPATIENT
Start: 2018-03-20 | End: 2018-03-22

## 2018-03-20 RX ORDER — LIDOCAINE 40 MG/G
CREAM TOPICAL
Status: DISCONTINUED | OUTPATIENT
Start: 2018-03-20 | End: 2018-03-27 | Stop reason: HOSPADM

## 2018-03-20 RX ORDER — NITROGLYCERIN 0.4 MG/1
0.4 TABLET SUBLINGUAL EVERY 5 MIN PRN
Status: DISCONTINUED | OUTPATIENT
Start: 2018-03-20 | End: 2018-03-27 | Stop reason: HOSPADM

## 2018-03-20 RX ORDER — MAGNESIUM SULFATE HEPTAHYDRATE 40 MG/ML
4 INJECTION, SOLUTION INTRAVENOUS EVERY 4 HOURS PRN
Status: DISCONTINUED | OUTPATIENT
Start: 2018-03-20 | End: 2018-03-27 | Stop reason: HOSPADM

## 2018-03-20 RX ADMIN — CHLORHEXIDINE GLUCONATE 15 ML: 1.2 RINSE ORAL at 09:27

## 2018-03-20 RX ADMIN — AMIODARONE HYDROCHLORIDE 400 MG: 200 TABLET ORAL at 09:17

## 2018-03-20 RX ADMIN — AMIODARONE HYDROCHLORIDE 400 MG: 200 TABLET ORAL at 21:07

## 2018-03-20 RX ADMIN — RANITIDINE HYDROCHLORIDE 150 MG: 150 SOLUTION ORAL at 09:17

## 2018-03-20 RX ADMIN — ENOXAPARIN SODIUM 60 MG: 60 INJECTION SUBCUTANEOUS at 06:06

## 2018-03-20 RX ADMIN — RANITIDINE HYDROCHLORIDE 150 MG: 150 SOLUTION ORAL at 21:06

## 2018-03-20 RX ADMIN — IPRATROPIUM BROMIDE AND ALBUTEROL SULFATE 3 ML: .5; 3 SOLUTION RESPIRATORY (INHALATION) at 07:48

## 2018-03-20 RX ADMIN — IPRATROPIUM BROMIDE AND ALBUTEROL SULFATE 3 ML: .5; 3 SOLUTION RESPIRATORY (INHALATION) at 15:23

## 2018-03-20 RX ADMIN — VALPROATE SODIUM 500 MG: 100 INJECTION, SOLUTION INTRAVENOUS at 09:28

## 2018-03-20 RX ADMIN — QUETIAPINE FUMARATE 25 MG: 25 TABLET ORAL at 21:06

## 2018-03-20 RX ADMIN — BACITRACIN: 500 OINTMENT TOPICAL at 09:07

## 2018-03-20 RX ADMIN — IPRATROPIUM BROMIDE AND ALBUTEROL SULFATE 3 ML: .5; 3 SOLUTION RESPIRATORY (INHALATION) at 20:17

## 2018-03-20 RX ADMIN — BACITRACIN: 500 OINTMENT TOPICAL at 16:06

## 2018-03-20 RX ADMIN — QUETIAPINE FUMARATE 50 MG: 25 TABLET ORAL at 09:17

## 2018-03-20 RX ADMIN — ENOXAPARIN SODIUM 60 MG: 60 INJECTION SUBCUTANEOUS at 17:37

## 2018-03-20 RX ADMIN — Medication 2 G: at 04:19

## 2018-03-20 RX ADMIN — IPRATROPIUM BROMIDE AND ALBUTEROL SULFATE 3 ML: .5; 3 SOLUTION RESPIRATORY (INHALATION) at 11:29

## 2018-03-20 RX ADMIN — ACETAMINOPHEN 650 MG: 325 TABLET, FILM COATED ORAL at 16:06

## 2018-03-20 RX ADMIN — AMIODARONE HYDROCHLORIDE 150 MG: 1.5 INJECTION, SOLUTION INTRAVENOUS at 04:19

## 2018-03-20 NOTE — PROVIDER NOTIFICATION
Notified telehub regarding soft pressures 79/64 and unimproved with nursing interventions, awaiting orders.

## 2018-03-20 NOTE — PLAN OF CARE
Problem: Patient Care Overview  Goal: Plan of Care/Patient Progress Review  OT: Attempted intervention; however, pt just finishing with PT and fatigued. PT advised to wait with OT intervention until later today to allow for rest.

## 2018-03-20 NOTE — PROGRESS NOTES
[facility] [place]   WO Nurse Inpatient Skin Assessment     Initial Assessment of:  Lt upper lip        Data:   Patient History:      per MD note(s):  86 yo male hx of GERD, barretts esophagus and skin cancer admitted  with small bowel obstruction. Initially managed conservatively however ultimately required operative investigation 3/4 where he underwent lysis of adhesions and partial small bowel resection.  His post op course was complicated by respiratory distress, agitation and SVT, atrial flutter for which he started on amiodarone via EP.  Additional imaging post op suggested continued bowel obstruction. RRT called yesterday for respiratory distress with CT Chest obtained 3/6 for respiratory distress negative for PE but mild emphysema , basilar fibrosis and possible pneumonia for he was started on zosyn.  TPN started 3/6      Earlier the evening of 3/6 nursing noted patient increased coarse breath sounds and desat, nasal trumpet placed for NT suctioned with transient improvement.  This AM was minimally arousable with desaturation not responsive to suctioning.  RRT called with patient found to be obstructing airway thus anesthesia called and patient intubated and transferred to the ICU.      On arrival to ICU patient sedate, hemodynamically appropriate, though not following commands.  VBG pre intubation demonstrated respiratory acidosis. NG output dark bilious - unchanged from prior per floor nurse.       Damian Assessment and sub scores:   Damian Score  Av.1  Min: 15  Max: 19    Positioning: Pillows,     Mattress:  Standard , Atmos Air mattress      Moisture Management:  Urinary Catheter    Catheter secured? Yes      Current Diet / Nutrition:     Active Diet Order      NPO for Medical/Clinical Reasons Except for: Meds      Labs:   Recent Labs   Lab Test  18   0429   18   0515   ALBUMIN   --    --   2.1*   HGB  10.6*   < >  12.4*   INR   --    --   1.23*   WBC  7.9   < >  7.7    < >  = values in this interval not displayed.           Skin Assessment (location): Lt upper lip  History:  Frail, fragile skin. Pt extubated  Lip healed.           Intervention:     Patient's chart evaluated.      Assessed: Lt upper lip    Orders  Reviewed. Keep lip moisturized          Assessment:       Lt upper lip: now completely healed        Plan:   Nursing to notify the Provider(s) and re-consult the WOC Nurse if skin deteriorate(s).  WOC Nurse will return: no need to continue to follow        Lt upper lip:      1. Moisturize BID with vaseline               Order updated to reflect above        WOC will sign off. Please re-consult if additional help required

## 2018-03-20 NOTE — PLAN OF CARE
Problem: Patient Care Overview  Goal: Plan of Care/Patient Progress Review  Discharge Planner PT   Patient plan for discharge: None stated  Current status: Sleepy/lethargic, mumbles words at times. Seems to recognize his Daughter,Prema, when she arrives. Sit<>stand from high back chair, Mod A x 2, pt unable to fully achieve upright. Stand/squat pivot with Mod A x 2 chair>bed. Sit>supine Mod A x 2.  Pt more lethargic today, was just Min A yesterday for similar activities. Note single bump in HR with standing to 130, HR then back to 90s/100s with rest.   Barriers to return to prior living situation: Fall risk, level of fatigue, decreased activity tolerance, strength and balance.  Recommendations for discharge: TCU  Rationale for recommendations: Pt will benefit from continued skilled rehab services to progress functional independence and safety with mobility.        Entered by: Kiersten Degroot 03/20/2018 11:56 AM

## 2018-03-20 NOTE — PLAN OF CARE
Problem: Patient Care Overview  Goal: Plan of Care/Patient Progress Review  Outcome: Improving  Pt confused x3. Restless at times requiring 0.4mcg of dex gtt. Moving extremities x4 strongly. LS rhonchi w/ good congested cough; 2L NC and tachypnea at times. Rhythm a fib to a flutter 80's-130's received 150mg amio bolus overnight, effective. BP's stable. Segal with good urine output. TF at goal rate. x2 soft, incontinent BM's. Skin fragile with multiple healing skin tears.

## 2018-03-20 NOTE — PROGRESS NOTES
Surgery    Awake but confused. Callie TF.    Abd- Soft without distention or tenderness.    A/P  Callie TF. No issues from surgical standpoint.    Robel Gomez M.D.  [facility]  [phone]

## 2018-03-20 NOTE — PROGRESS NOTES
[facility]    Critical Care Service  Progress Note    Date of Service (when I saw the patient): 03/20/2018     Main Plans for Today   Kiran CASTELLANOS    Problem List   SBO s/p ex lap, TISHA and partial small bowel resection 3/4  A-fib with RVR--now in sinus  Respiratory failure s/p multiple re-intubations, now extubated  Aspiration   Protein calorie malnutrition, on TPN   Pulmonary fibrosis     Assessment & Plan   Joseluis Falcon is a 85 male initially admitted for SBO on 2/27, initially managed non-operatively now s/p lysis of adhesions and partial small bowel resection on 3/4/18. Decompensated with encephalopathy and respiratory failure night of 3/6, intubated and transferred to ICU. Extubated 3/11 and re-intubated 3/12 after increased WOB. Now extubated and ileus improved.    Neurology/Psychiatry:   1. Encephalopathy, mixed metabolic with delirium componant.  Seems to be mostly delirium now. A bit oversedated this morning on seroquel and valproate.  Plan  - prn haldol q6 (last qtc ok on 3/7).  Decrease seroquel to 25 bid (from 50).  Stop valproate.  - prn dilaudid  / tylenol      Cardiovascular:   1. Atrial fibrillation  ( RVR resolved, currently in sinus)  Plan  - BP goal MAP >65, no pressors   - EP consulted earlier,. now back in sinus rhythm.  Amio drip increased back up 3/16 after 2nd another bout of afib.  Now transitioned to PO amiodarone.  - Now on sq lovenox treatment dosing for ease of administration.  Long term anticoagulation to be determined at a later date.       Pulmonary/Ventilator Management:   1. Acute hypercarbic respiratory failure -first respiratory failure in setting of recent surgery, likely aspiration with evidence of REJI physiology on initial eval. Extubated 3/11 and re-intubated 3/12 after increased WOB and failed BiPAP trial. Now re-extubated 3/18.  2. Aspiration pneumonia--resolved  3. Mild emphysema/fibrosis - ? Age related   Plan  - Extubated 3/11, re-intubated overnight  3/12. Now re-extubated 3/18.  - Pulmonary toilet   - Scheduled duonebs   - OOB to chair      GI and Nutrition :   1. Small bowel obstruction s/p ex lap, TISHA and partial small bowel resection 3/4 - with subsequent imaging suggestive of continued obstruction vs ileus   2. Ileus   3. Severe protein calorie malnutrition  4. GERD/Carlson's   Plan  - Replaced NG tube, clamped check residuals q 4 hours  - TF at trophic, doing well, increase as tolerated.  - TPN, weaned off  -  H2 blocker     Renal/Fluids/Electrolytes:   1. ?Mild BJ, slight elevation in creatinine --- resolved  2. Electrolyte abnormality - hypernatremia resolved  Plan  - adjust medications as needed for renal clearance  - follow I/O's as appropriate  - Replacement elyte as needed per protocol      Infectious Disease:   1. Aspiration pneumonitis vs pneumonia  Plan  - zosyn  7 day course for suspected aspiration PNA completed  - no abx currently       Endocrine:   1. Stress induced hyperglycemia-- resolved  Plan  - Goal sugar <180 for improved healing  - SSI      Hematology/Oncology:   1. Normocytic anemia  2. Thrombocytopenia, resolved   Plan  - Peripherally follow hb.  No s/sy active blood loss or hemodynamic instability (3x/week?)     General cares:  DVT Prophylaxis: Enoxaparin (Lovenox) SQ (therapeutic for AF)  GI Prophylaxis: H2 Blocker  Restraints: Restraints for medical healing needed: YES  Family update by me today: Daughter, see separate note.  Current lines are required for patient management  Access: PICC placed 3/6/18     Roverto Abreu    Interval History   No acute events.  Breathing comfortably but still quite delerious this AM.  History limited by this.      Physical Exam   Temp: 99.2  F (37.3  C) Temp src: Axillary Temp  Min: 97.7  F (36.5  C)  Max: 99.8  F (37.7  C) BP: 116/58   Heart Rate: 82 Resp: 30 SpO2: 92 % O2 Device: Nasal cannula Oxygen Delivery: 2 LPM  Vitals:    03/18/18 0400 03/19/18 0400 03/20/18 0400   Weight: 63.8 kg (140 lb  10.5 oz) 63.4 kg (139 lb 12.4 oz) 63.7 kg (140 lb 6.9 oz)         Intake/Output Summary (Last 24 hours) at 03/20/18 1412  Last data filed at 03/20/18 1200   Gross per 24 hour   Intake           2178.3 ml   Output             1450 ml   Net            728.3 ml     GEN: awake, alert, delirious/picking at lines/tubes.  EYES: PERRL, Anicteric sclera.  HEENT:  Normocephalic, atraumatic, trachea midline  CV: RRR, no gallops, rubs, or murmurs  PULM/CHEST: Clear breath sounds bilaterally without rhonchi, crackles or wheeze, symmetric chest rise.  Unlabored.  GI: quiet bowel sounds, soft, non-tender, no guarding  : alba catheter in place, urine yellow and clear  EXTREMITIES: scant peripheral edema,peripheral pulses intact  NEURO: SEBASTIEN no focal deficits noted  SKIN: warm and dry and thin  PSYCH: LEANN  Imaging personally reviewed: no new  ECG: SR    Medications     dexmedetomidine 0.4 mcg/kg/hr (03/20/18 0518)     propofol (DIPRIVAN) infusion Stopped (03/17/18 1149)     IV fluid REPLACEMENT ONLY         QUEtiapine  25 mg Oral BID     bacitracin   Topical TID     amiodarone  400 mg Oral or FT or NG tube BID    Followed by     [START ON 3/21/2018] amiodarone  200 mg Oral or FT or NG tube BID    Followed by     [START ON 3/23/2018] amiodarone  200 mg Oral or FT or NG tube Daily     rantidine  150 mg Oral BID     enoxaparin  1 mg/kg Subcutaneous Q12H     ipratropium - albuterol 0.5 mg/2.5 mg/3 mL  3 mL Nebulization 4x daily     insulin aspart  1-6 Units Subcutaneous Q4H     chlorhexidine  15 mL Mouth/Throat Q12H     sodium chloride (PF)  3 mL Intracatheter Q8H     sodium chloride (PF)  10 mL Intracatheter Q8H     Labs (personally reviewed/interpreted):  Lytes ok.  Creatinine stable/acceptable 0.68.  vbg with 7.42/48/36/31.  Wbc ok 8.4.  hgb ok 10.0.  pltlts ok 245    D/w On-call hospitalist.

## 2018-03-20 NOTE — PROGRESS NOTES
500 cc LR bolus ordered. Lactate ordered. Continue to hold precedex.     BP improved. Patient tachycardic - AFlutter / AFib. 150 mg Amiodarone bolus given.       Trey Perez

## 2018-03-20 NOTE — PROGRESS NOTES
Contacted pt's daughter/decision-maker Prema by phone at her request.  She was concerned because she had been told by prior intensivist that if he were to be re-intubated again he would require a tracheostomy.  Given the fact that he has been re-intubated twice in the last 1-2 weeks, I am in agreement with this.  We also discussed that though he is no respiratory distress now, it's likely that he's silently aspirating his own secretions, and is high risk for needing reintubation. Prema felt that he would not want to be trached, so felt that DNR/DNI would be a better code status for him.  Should he have respiratory troubles, her plan would be to pivot to comfort-measures only.

## 2018-03-20 NOTE — PROVIDER NOTIFICATION
Notified Dr Perez regarding pt's arterial flutter to the 130's, BP's stable in 90's/60's. UOP 50cc/hr. And RR 30-35 bpm. Received orders for VBG and re assess morning lab work.

## 2018-03-20 NOTE — PROGRESS NOTES
Report called to station 33 JEANNE Jacobo.   Alert and oriented to self only.  Precedx has been off since 08:00 today so he is clearing. Patient currently has a sitter to prevent pulling his NG as he has no gag reflex and is unable to protect his airway from aspiration.  Today his daughter Prema changed his status to DNR/DNI so if he develops respiratory distress again the plan is comfort care only.  Chronic afib with RVR currently receiving lovenox.

## 2018-03-21 LAB
ANION GAP SERPL CALCULATED.3IONS-SCNC: 4 MMOL/L (ref 3–14)
BUN SERPL-MCNC: 29 MG/DL (ref 7–30)
CALCIUM SERPL-MCNC: 7.6 MG/DL (ref 8.5–10.1)
CHLORIDE SERPL-SCNC: 105 MMOL/L (ref 94–109)
CO2 SERPL-SCNC: 29 MMOL/L (ref 20–32)
CREAT SERPL-MCNC: 0.67 MG/DL (ref 0.66–1.25)
ERYTHROCYTE [DISTWIDTH] IN BLOOD BY AUTOMATED COUNT: 13.3 % (ref 10–15)
GFR SERPL CREATININE-BSD FRML MDRD: >90 ML/MIN/1.7M2
GLUCOSE BLDC GLUCOMTR-MCNC: 107 MG/DL (ref 70–99)
GLUCOSE BLDC GLUCOMTR-MCNC: 118 MG/DL (ref 70–99)
GLUCOSE BLDC GLUCOMTR-MCNC: 98 MG/DL (ref 70–99)
GLUCOSE SERPL-MCNC: 107 MG/DL (ref 70–99)
HCT VFR BLD AUTO: 29.9 % (ref 40–53)
HGB BLD-MCNC: 9.8 G/DL (ref 13.3–17.7)
MAGNESIUM SERPL-MCNC: 2.1 MG/DL (ref 1.6–2.3)
MCH RBC QN AUTO: 31.8 PG (ref 26.5–33)
MCHC RBC AUTO-ENTMCNC: 32.8 G/DL (ref 31.5–36.5)
MCV RBC AUTO: 97 FL (ref 78–100)
PLATELET # BLD AUTO: 230 10E9/L (ref 150–450)
POTASSIUM SERPL-SCNC: 4.1 MMOL/L (ref 3.4–5.3)
RBC # BLD AUTO: 3.08 10E12/L (ref 4.4–5.9)
SODIUM SERPL-SCNC: 138 MMOL/L (ref 133–144)
WBC # BLD AUTO: 7.3 10E9/L (ref 4–11)

## 2018-03-21 PROCEDURE — 25000132 ZZH RX MED GY IP 250 OP 250 PS 637: Performed by: INTERNAL MEDICINE

## 2018-03-21 PROCEDURE — 40000275 ZZH STATISTIC RCP TIME EA 10 MIN

## 2018-03-21 PROCEDURE — 12000007 ZZH R&B INTERMEDIATE

## 2018-03-21 PROCEDURE — 27210429 ZZH NUTRITION PRODUCT INTERMEDIATE LITER

## 2018-03-21 PROCEDURE — 83735 ASSAY OF MAGNESIUM: CPT | Performed by: INTERNAL MEDICINE

## 2018-03-21 PROCEDURE — 40000239 ZZH STATISTIC VAT ROUNDS

## 2018-03-21 PROCEDURE — 25000128 H RX IP 250 OP 636: Performed by: INTERNAL MEDICINE

## 2018-03-21 PROCEDURE — 85027 COMPLETE CBC AUTOMATED: CPT | Performed by: INTERNAL MEDICINE

## 2018-03-21 PROCEDURE — 00000146 ZZHCL STATISTIC GLUCOSE BY METER IP

## 2018-03-21 PROCEDURE — 99207 ZZC CDG-MDM COMPONENT: MEETS MODERATE - UP CODED: CPT | Performed by: INTERNAL MEDICINE

## 2018-03-21 PROCEDURE — 80048 BASIC METABOLIC PNL TOTAL CA: CPT | Performed by: INTERNAL MEDICINE

## 2018-03-21 PROCEDURE — 25000132 ZZH RX MED GY IP 250 OP 250 PS 637: Performed by: HOSPITALIST

## 2018-03-21 PROCEDURE — 94640 AIRWAY INHALATION TREATMENT: CPT | Mod: 76

## 2018-03-21 PROCEDURE — 94640 AIRWAY INHALATION TREATMENT: CPT

## 2018-03-21 PROCEDURE — 25000128 H RX IP 250 OP 636: Performed by: NURSE PRACTITIONER

## 2018-03-21 PROCEDURE — 25000125 ZZHC RX 250: Performed by: PHYSICIAN ASSISTANT

## 2018-03-21 PROCEDURE — 99233 SBSQ HOSP IP/OBS HIGH 50: CPT | Performed by: INTERNAL MEDICINE

## 2018-03-21 RX ADMIN — IPRATROPIUM BROMIDE AND ALBUTEROL SULFATE 3 ML: .5; 3 SOLUTION RESPIRATORY (INHALATION) at 19:34

## 2018-03-21 RX ADMIN — HALOPERIDOL LACTATE 2 MG: 5 INJECTION, SOLUTION INTRAMUSCULAR at 15:44

## 2018-03-21 RX ADMIN — IPRATROPIUM BROMIDE AND ALBUTEROL SULFATE 3 ML: .5; 3 SOLUTION RESPIRATORY (INHALATION) at 15:05

## 2018-03-21 RX ADMIN — ENOXAPARIN SODIUM 60 MG: 60 INJECTION SUBCUTANEOUS at 05:56

## 2018-03-21 RX ADMIN — QUETIAPINE FUMARATE 25 MG: 25 TABLET ORAL at 21:11

## 2018-03-21 RX ADMIN — AMIODARONE HYDROCHLORIDE 200 MG: 200 TABLET ORAL at 08:52

## 2018-03-21 RX ADMIN — AMIODARONE HYDROCHLORIDE 200 MG: 200 TABLET ORAL at 21:12

## 2018-03-21 RX ADMIN — QUETIAPINE FUMARATE 25 MG: 25 TABLET ORAL at 08:52

## 2018-03-21 RX ADMIN — HALOPERIDOL LACTATE 2 MG: 5 INJECTION, SOLUTION INTRAMUSCULAR at 08:40

## 2018-03-21 RX ADMIN — IPRATROPIUM BROMIDE AND ALBUTEROL SULFATE 3 ML: .5; 3 SOLUTION RESPIRATORY (INHALATION) at 08:11

## 2018-03-21 RX ADMIN — ENOXAPARIN SODIUM 60 MG: 60 INJECTION SUBCUTANEOUS at 18:17

## 2018-03-21 RX ADMIN — IPRATROPIUM BROMIDE AND ALBUTEROL SULFATE 3 ML: .5; 3 SOLUTION RESPIRATORY (INHALATION) at 12:04

## 2018-03-21 RX ADMIN — BACITRACIN: 500 OINTMENT TOPICAL at 15:45

## 2018-03-21 RX ADMIN — RANITIDINE HYDROCHLORIDE 150 MG: 150 SOLUTION ORAL at 21:12

## 2018-03-21 RX ADMIN — RANITIDINE HYDROCHLORIDE 150 MG: 150 SOLUTION ORAL at 08:52

## 2018-03-21 RX ADMIN — BACITRACIN: 500 OINTMENT TOPICAL at 21:12

## 2018-03-21 NOTE — PLAN OF CARE
Problem: Patient Care Overview  Goal: Plan of Care/Patient Progress Review  PT: Spoke with RN who states the pt is agitated and difficult to direct this afternoon, mitts in place and sitter at bedside. The last two days the pt has been lethargic but directible with PT; however, after discussion with the RN pt's presentation is different than previous days; therefore, recommends holding PT at this time.

## 2018-03-21 NOTE — PROGRESS NOTES
Surgery    Callie TF. BM yesterday. Awake but agitated.    Abd- Soft- Non-tender.    A/P  Doing well from surgery standpoint. Continue medical care    Robel Gomez M.D.  [facility]  [phone]

## 2018-03-21 NOTE — PLAN OF CARE
Problem: Patient Care Overview  Goal: Plan of Care/Patient Progress Review  Alert, confused, restless for most part, sitter at bedside.  IMC, afib controlled.  Tube feeding at goal rate of 80ml/hr via NG, tolerated, BS active.  Lap sites CDI.  Indwelling urinary catheter intact and patent, good UO.  Right arm PICC saline lock, dressing CDI.  Mepilex on left forearm and buttocks.  Does not appear to be in pain.  Slept some in between care, otherwise awake, restless and pulling lines.

## 2018-03-21 NOTE — PLAN OF CARE
Problem: Patient Care Overview  Goal: Plan of Care/Patient Progress Review  Outcome: No Change  Disoriented x4. AVSS.  Sitter at bedside, mitts in place.  No pain appreciated. 1 small BM.  UOP adequate. PICC intact. NG TF infusing @ goal- residual minimal.  Tele converted to SR

## 2018-03-21 NOTE — PLAN OF CARE
Problem: Bowel Obstruction (Adult)  Goal: Signs and Symptoms of Listed Potential Problems Will be Absent, Minimized or Managed (Bowel Obstruction)  Signs and symptoms of listed potential problems will be absent, minimized or managed by discharge/transition of care (reference Bowel Obstruction (Adult) CPG).   Outcome: No Change  Arrived from ICU with sitter-no RN, flyer or on monitor. IMC status. Writer added monitor, vitally stable ex previous, asymptomatic afib-on PO ami. NG patent, TF at 80, goal. 4 laps with adhesive strips, WNL. Many skin tears, mepilexes applied. Sitter at bedside. Will pass report to oncoming nurse.

## 2018-03-21 NOTE — PLAN OF CARE
Problem: Patient Care Overview  Goal: Plan of Care/Patient Progress Review  OT: Per nursing, pt not appropriate for skilled OT today, will reschedule.

## 2018-03-22 LAB
ANION GAP SERPL CALCULATED.3IONS-SCNC: 5 MMOL/L (ref 3–14)
BUN SERPL-MCNC: 30 MG/DL (ref 7–30)
CALCIUM SERPL-MCNC: 7.7 MG/DL (ref 8.5–10.1)
CHLORIDE SERPL-SCNC: 104 MMOL/L (ref 94–109)
CO2 SERPL-SCNC: 27 MMOL/L (ref 20–32)
CREAT SERPL-MCNC: 0.71 MG/DL (ref 0.66–1.25)
GFR SERPL CREATININE-BSD FRML MDRD: >90 ML/MIN/1.7M2
GLUCOSE BLDC GLUCOMTR-MCNC: 109 MG/DL (ref 70–99)
GLUCOSE SERPL-MCNC: 128 MG/DL (ref 70–99)
INTERPRETATION ECG - MUSE: NORMAL
MAGNESIUM SERPL-MCNC: 1.8 MG/DL (ref 1.6–2.3)
POTASSIUM SERPL-SCNC: 4.4 MMOL/L (ref 3.4–5.3)
SODIUM SERPL-SCNC: 136 MMOL/L (ref 133–144)

## 2018-03-22 PROCEDURE — 83735 ASSAY OF MAGNESIUM: CPT | Performed by: INTERNAL MEDICINE

## 2018-03-22 PROCEDURE — 40000275 ZZH STATISTIC RCP TIME EA 10 MIN

## 2018-03-22 PROCEDURE — 25000132 ZZH RX MED GY IP 250 OP 250 PS 637: Performed by: INTERNAL MEDICINE

## 2018-03-22 PROCEDURE — 25000132 ZZH RX MED GY IP 250 OP 250 PS 637: Performed by: HOSPITALIST

## 2018-03-22 PROCEDURE — 80048 BASIC METABOLIC PNL TOTAL CA: CPT | Performed by: INTERNAL MEDICINE

## 2018-03-22 PROCEDURE — 25000128 H RX IP 250 OP 636: Performed by: NURSE PRACTITIONER

## 2018-03-22 PROCEDURE — 27210429 ZZH NUTRITION PRODUCT INTERMEDIATE LITER

## 2018-03-22 PROCEDURE — 25000132 ZZH RX MED GY IP 250 OP 250 PS 637: Performed by: NURSE PRACTITIONER

## 2018-03-22 PROCEDURE — 00000146 ZZHCL STATISTIC GLUCOSE BY METER IP

## 2018-03-22 PROCEDURE — 99233 SBSQ HOSP IP/OBS HIGH 50: CPT | Performed by: INTERNAL MEDICINE

## 2018-03-22 PROCEDURE — 94640 AIRWAY INHALATION TREATMENT: CPT | Mod: 76

## 2018-03-22 PROCEDURE — 40000239 ZZH STATISTIC VAT ROUNDS

## 2018-03-22 PROCEDURE — 25000128 H RX IP 250 OP 636: Performed by: INTERNAL MEDICINE

## 2018-03-22 PROCEDURE — 12000007 ZZH R&B INTERMEDIATE

## 2018-03-22 PROCEDURE — 94640 AIRWAY INHALATION TREATMENT: CPT

## 2018-03-22 PROCEDURE — 25000125 ZZHC RX 250: Performed by: PHYSICIAN ASSISTANT

## 2018-03-22 PROCEDURE — 25000125 ZZHC RX 250: Performed by: INTERNAL MEDICINE

## 2018-03-22 RX ORDER — AMINO ACIDS/PROTEIN HYDROLYS 11G-40/45
1 LIQUID IN PACKET (ML) ORAL 2 TIMES DAILY
Status: DISCONTINUED | OUTPATIENT
Start: 2018-03-22 | End: 2018-03-23

## 2018-03-22 RX ORDER — QUETIAPINE FUMARATE 25 MG/1
25 TABLET, FILM COATED ORAL 3 TIMES DAILY
Status: DISCONTINUED | OUTPATIENT
Start: 2018-03-22 | End: 2018-03-23

## 2018-03-22 RX ADMIN — RANITIDINE HYDROCHLORIDE 150 MG: 150 SOLUTION ORAL at 22:17

## 2018-03-22 RX ADMIN — QUETIAPINE FUMARATE 25 MG: 25 TABLET ORAL at 09:44

## 2018-03-22 RX ADMIN — QUETIAPINE FUMARATE 25 MG: 25 TABLET ORAL at 22:16

## 2018-03-22 RX ADMIN — IPRATROPIUM BROMIDE AND ALBUTEROL SULFATE 3 ML: .5; 3 SOLUTION RESPIRATORY (INHALATION) at 15:31

## 2018-03-22 RX ADMIN — ENOXAPARIN SODIUM 60 MG: 60 INJECTION SUBCUTANEOUS at 06:51

## 2018-03-22 RX ADMIN — Medication 1 PACKET: at 22:16

## 2018-03-22 RX ADMIN — Medication 2 G: at 10:40

## 2018-03-22 RX ADMIN — IPRATROPIUM BROMIDE AND ALBUTEROL SULFATE 3 ML: .5; 3 SOLUTION RESPIRATORY (INHALATION) at 07:25

## 2018-03-22 RX ADMIN — ENOXAPARIN SODIUM 60 MG: 60 INJECTION SUBCUTANEOUS at 19:03

## 2018-03-22 RX ADMIN — RANITIDINE HYDROCHLORIDE 150 MG: 150 SOLUTION ORAL at 09:44

## 2018-03-22 RX ADMIN — BACITRACIN: 500 OINTMENT TOPICAL at 17:07

## 2018-03-22 RX ADMIN — QUETIAPINE FUMARATE 25 MG: 25 TABLET ORAL at 17:07

## 2018-03-22 RX ADMIN — BACITRACIN: 500 OINTMENT TOPICAL at 08:03

## 2018-03-22 RX ADMIN — IPRATROPIUM BROMIDE AND ALBUTEROL SULFATE 3 ML: .5; 3 SOLUTION RESPIRATORY (INHALATION) at 11:39

## 2018-03-22 RX ADMIN — IPRATROPIUM BROMIDE AND ALBUTEROL SULFATE 3 ML: .5; 3 SOLUTION RESPIRATORY (INHALATION) at 19:30

## 2018-03-22 RX ADMIN — BACITRACIN: 500 OINTMENT TOPICAL at 22:17

## 2018-03-22 RX ADMIN — AMIODARONE HYDROCHLORIDE 200 MG: 200 TABLET ORAL at 09:44

## 2018-03-22 RX ADMIN — HALOPERIDOL LACTATE 2 MG: 5 INJECTION, SOLUTION INTRAMUSCULAR at 20:52

## 2018-03-22 RX ADMIN — Medication 1 PACKET: at 12:44

## 2018-03-22 RX ADMIN — AMIODARONE HYDROCHLORIDE 200 MG: 200 TABLET ORAL at 22:16

## 2018-03-22 NOTE — PLAN OF CARE
Problem: Patient Care Overview  Goal: Plan of Care/Patient Progress Review  Outcome: No Change  Disoriented x4. Continued use of mits. Pt too disoriented to cough when instructed to do so. Labored breathing with thick secretions. Incontinent to stool with Segal and AUO.  Sitter provided overnight. Up with assist of 2.

## 2018-03-22 NOTE — PLAN OF CARE
Problem: Patient Care Overview  Goal: Plan of Care/Patient Progress Review  OT and PT: Spoke with nurse this AM who reports patient's condition is worse today. Holding therapies at this time.

## 2018-03-22 NOTE — PROGRESS NOTES
[facility]    Hospitalist Progress Note    Assessment & Plan   Joseluis Falcon is a 85 year-old male with history of bilateral inguinal herniorrhaphy and psoriasis who [facility] on 02/27/2018 with a 2-3 day history of nausea, vomiting and abdominal pain.  Workup revealed SBO, likely due to adhesions.  Failed nonoperative management for 6 days after which he was taken to the OR on 03/04/2018 and underwent exp lap, TISHA and partial small-bowel resection.  He has had a complicated postop course including extended intubation, pneumonia and prolonged ICU delirium. Hospitalist team resumed primary medical cares 3/20/18.       Small bowel obstruction secondary to adhesions, s/p exploratory laparoscopy with lysis of adhesions and small bowel resection, repair of small umbilical hernia 3/4/18  Presented with symptoms of nausea, vomiting and abdominal pain, found secondary to SBO which was in turn due to adhesions.  He failed nonoperative management and underwent above surgery. Post-op complicated by an ileus which has now resolved.    -General surgery following   -Post-operative cares per general surgery service     Acute metabolic encephalopathy  Prolonged delirium secondary to pneumonia and ICU delirium.  -Remains confused  -Continue scheduled quetiapine, will increase to 3 times daily  -Re-orient as needed  -Maintain normal sleep/wake and day/night cycle  -Avoid sedating/altering medications as able  -Mitts in place and sitter at bedside as pulling at lines  -This appears to be the main issue at the moment; d/w daughter Prema on the phone; if no improvement in the next 24 hours; further discussion about de-escalation of care.     Severe malnutrition  The patient did not receive nutrition for greater than 5 days, thus was initiated on TPN, which has since weaned off.  He is now getting tube feed via NJ tube.  -Continue tube feeds  -We will need further discussion with family about feeding options, as  it appears patient has had NG tube for 18 days now      Acute hypercapnic respiratory failure  Required intubation for airway protection for exploratory laparotomy, TISHA and partial small-bowel obstruction.  Able to extubate on 03/11 but reintubated on 03/12, and was ultimately able to extubate on 03/18.  CT chest with IV contrast on 03/06 was negative for PE, but revealed mild emphysema and fibrosis, (probably age-related). -Currently on room air  -Monitor respiratory status closely, appears to be tachypneic this morning however not requiring supplemental oxygen, appears to have predominantly problem with upper airway secretions.      Postoperative atrial fibrillation with RVR  Required amiodarone drip and rate controlling agent.   -Currently on oral amiodarone only.   -Now converted to normal sinus rhythm  -Electrophysiology followed patient initially and recommended heparin drip  -On enoxaparin 60 mg subcutaneous b.i.d. for stroke prophylaxis at the moment.    -Likely will switch to NOAC pending clinical course.      Aspiration pneumonia, resolved  Completed 7 day course of piperacillin-tazobactam IV   -Stable respiratory status.       Acute post-operative blood loss anemia, stable  Thrombocytopenia, resolved  -Hemoglobin stable  -Platelet count within normal limits  -Monitor periodically     Acute kidney injury  Peak creatinine 1.37. Likely prerenal, responded well to IV fluid hydration.   -Creatinine stable    # Pain Assessment:   Current Pain Score 3/20/2018 3/20/2018 3/20/2018   Patient currently in pain? denies other (see comments) sleeping: patient not able to self report   Pain score (0-10) - - -   Pain location - - -   Pain descriptors - - -   CPOT pain score - - -   - Joseluis is unable to participate in a collaborative plan for pain management due to acute encephalopathy.   - Please see the plan for pain management as documented above      D/W: RN  DVT Prophylaxis: Enoxaparin (Lovenox) SQ  Code Status:  DNR/DNI    Disposition: Expected discharge unclear, pending clinical progress, likely will take at least 2+ days    Gibson Aguillon MD    Interval History    Patient continues to be disoriented and confused.  Agitated at times.  Tachypneic however not requiring any supplemental oxygen.  Continues to requires sitter.  Afebrile.    -Data reviewed today: I reviewed all new labs and imaging results over the last 24 hours. I personally reviewed the EKG tracing showing nsr.      Physical Exam   Temp: 98  F (36.7  C) Temp src: Oral BP: 132/62   Heart Rate: 83 Resp: 28 SpO2: 97 % O2 Device: None (Room air)    Vitals:    03/18/18 0400 03/19/18 0400 03/20/18 0400   Weight: 63.8 kg (140 lb 10.5 oz) 63.4 kg (139 lb 12.4 oz) 63.7 kg (140 lb 6.9 oz)     Vital Signs with Ranges  Temp:  [98  F (36.7  C)-98.9  F (37.2  C)] 98  F (36.7  C)  Heart Rate:  [78-83] 83  Resp:  [20-28] 28  BP: (109-132)/(52-62) 132/62  SpO2:  [95 %-98 %] 97 %  I/O last 3 completed shifts:  In: 1125 [NG/GT:605]  Out: 1775 [Urine:1200; Emesis/NG output:575]    Constitutional: Awake, confused and disoriented, not following commands.    HEENT: NG tube in place, no pallor or icterus  Neck- Supple, Good ROM, No JVD  Respiratory: Coarse breath sounds bilaterally, slightly tachypneic  Cardiovascular: RRR, No murmur  GI: Soft, Non- tender, abdominal incision with Steri-Strips, dry and clean.  Skin/Integument: Multiple bruising in different body parts  MSK: No joint deformity or swelling, no edema  Neuro: CN- grossly intact, appears to move all 4 extremities symmetrically      Medications           protein modular  1 packet Per Feeding Tube BID     QUEtiapine  25 mg Oral BID     bacitracin   Topical TID     amiodarone  200 mg Oral or FT or NG tube BID    Followed by     [START ON 3/23/2018] amiodarone  200 mg Oral or FT or NG tube Daily     rantidine  150 mg Oral BID     enoxaparin  1 mg/kg Subcutaneous Q12H     ipratropium - albuterol 0.5 mg/2.5 mg/3 mL  3 mL  Nebulization 4x daily     sodium chloride (PF)  10 mL Intracatheter Q8H       Data       Recent Labs  Lab 03/22/18  1000 03/21/18  0600 03/20/18  0310 03/19/18  0500  03/17/18  0445   WBC  --  7.3 8.4  --   --  7.2   HGB  --  9.8* 10.0*  --   --  9.1*   MCV  --  97 98  --   --  101*   PLT  --  230 245  --   --  295   INR  --   --   --  1.14  --   --     138 140 139  < > Canceled, Test credited   POTASSIUM 4.4 4.1 4.3 4.5  < > Canceled, Test credited   CHLORIDE 104 105 104 107  < > Canceled, Test credited   CO2 27 29 29 29  < > Canceled, Test credited   BUN 30 29 31* 35*  < > Canceled, Test credited   CR 0.71 0.67 0.68 0.72  < > Canceled, Test credited   ANIONGAP 5 4 7 3  < > Canceled, Test credited   TOBI 7.7* 7.6* 7.6* 7.9*  < > Canceled, Test credited   * 107* 110* 112*  < > Canceled, Test credited   ALBUMIN  --   --   --  1.9*  --   --    PROTTOTAL  --   --   --  5.8*  --   --    BILITOTAL  --   --   --  0.4  --   --    ALKPHOS  --   --   --  176*  --   --    ALT  --   --   --  44  --   --    AST  --   --   --  35  --   --    < > = values in this interval not displayed.    No results found for this or any previous visit (from the past 24 hour(s)).

## 2018-03-22 NOTE — PLAN OF CARE
Problem: Patient Care Overview  Goal: Plan of Care/Patient Progress Review  Alert to self only, restless, agitated, haldol PRN and jean-pierre seroquel, sitter at bedside,mitts utilized. Abd incision with steri-strips CDI/JASON. TF via NGT at a goal of 80ml/hr with 30ml free water q 4hrs. PICC patent. Mepilex to LUE. New Mepilex applied to coccyx. Segal patent, adequate UOP. Denies pain.

## 2018-03-22 NOTE — PLAN OF CARE
Problem: Patient Care Overview  Goal: Plan of Care/Patient Progress Review  Outcome: No Change  Disoriented x3 - alert to self. AVSS.  Sitter at bedside.  No pain appreciated. 1 large, soft BM.  UOP adequate. PICC intact. NG TF infusing @ goal- residual minimal.  Once current TF bag complete, switch to Isosource 1.5 and start at 30 mL/hr and switch flushes to 175 mL q4h (Read Dieticians note). Tele converted to SR yesterday.

## 2018-03-22 NOTE — PROGRESS NOTES
CLINICAL NUTRITION SERVICES - REASSESSMENT NOTE      Recommendations Ordered by Registered Dietitian (RD):   Change enteral nutrition to standard formula - no longer needs wound healing/ isocaloric formula.    Stop promote with fiber and start Isosource 1.5 @ 55 mL/hr x 24 hrs to provide 1320 mL, 1980 kcals (31 kcals/kg), 90 g pro (1.4 g pro/kg), 232 g CHO, 20 g fiber, 1003 mL H2O.   Add one packet ProSource BID to provide and additional 80 kcals and 22 g pro  Total EN regimen to provide 2060 kcals (32 kcals/kg) and 112 g pro (1.75 g pro/kg)    Advancement Schedule: Stop promote with fiber when last bag infused and start Isosource 1.5 @ 30 mL/hr and advance by 15 mL q 6 hrs pending tolerance.    When TF regimen is at goal increase H2O flushes to 175 mL q 4 hrs  Total H2O from flushes and TF = 2053 mL/day   Malnutrition: (3/6)  % Weight Loss:  Unable to determine without recent history  % Intake:  </= 50% for >/= 5 days (severe malnutrition)  Subcutaneous Fat Loss:  Orbital region moderate depletion and Upper arm region moderate depletion  Muscle Loss:  Temporal region severe depletion and Clavicle bone region severe depletion  Fluid Retention:  None noted      Malnutrition Diagnosis: Severe malnutrition  In Context of:  Acute illness or injury  Environmental or social circumstances        EVALUATION OF PROGRESS TOWARD GOALS   Diet:  NPO  Nutrition Support:  TF @ goal since 3/19    Nutrition Support Enteral:  Type of Feeding Tube: ND tube  Enteral Frequency:  Continuous  Enteral Regimen: Promote with Fiber @ 80 mL/hr  Total Enteral Provisions: 1920 kcal (30 kcal/kg), 120 g protein (1.9 g/kg), 265 g CHO, 27 g fiber, 1594 mL H2O  Free Water Flush: 30 mL q 4 hours    Intake:    NPO since admission    Dosing Weight:  63.9 (3/8 wt)    ASSESSED NUTRITION NEEDS:  Energy needs: 7257-1470 kcals (30-35 kcal/kg) - malnourished  Protein Needs:   grams protein (1.5-2.0 g pro/Kg) - Repletion and hypercatabolism with acute  "illness    NEW FINDINGS:   -Wt: Stable @ 140 lbs  -Labs: K and Mg WNL  -GI: BM 1-3x/day  -Skin: Per WOCN on 3/20: \" Lt upper lip: now completely healed\"      Previous Goals:   Promote with Fiber at goal of 80 mL/hr will continue to meet % of needs  Evaluation: Met    Previous Nutrition Diagnosis:   No nutrition diagnosis identified at this time   Evaluation: No change      CURRENT NUTRITION DIAGNOSIS  No nutrition diagnosis identified at this time    INTERVENTIONS  Recommendations / Nutrition Prescription  Change enteral nutrition to standard formula - no longer needs wound healing/ isocaloric formula.    Stop promote with fiber and start Isosource 1.5 @ 55 mL/hr x 24 hrs to provide 1320 mL, 1980 kcals (31 kcals/kg), 90 g pro (1.4 g pro/kg), 232 g CHO, 20 g fiber, 1003 mL H2O.   Add one packet ProSource BID to provide and additional 80 kcals and 22 g pro  Total EN regimen to provide 2060 kcals (32 kcals/kg) and 112 g pro (1.75 g pro/kg)    Advancement Schedule: Stop promote with fiber when last bag infused and start Isosource 1.5 @ 30 mL/hr and advance by 15 mL q 6 hrs pending tolerance.    When TF regimen is at goal increase H2O flushes to 175 mL q 4 hrs  Total H2O from flushes and TF = 2053 mL/day    Implementation  EN Composition - entered order in EPIC as above    Goals  Patient will tolerate new EN formula in the next 3-4 days.      MONITORING AND EVALUATION:  Progress towards goals will be monitored and evaluated per protocol and Practice Guidelines      Denisse French MNT Dietetic Intern    "

## 2018-03-23 ENCOUNTER — APPOINTMENT (OUTPATIENT)
Dept: GENERAL RADIOLOGY | Facility: CLINIC | Age: 83
DRG: 329 | End: 2018-03-23
Attending: FAMILY MEDICINE
Payer: COMMERCIAL

## 2018-03-23 LAB
ANION GAP SERPL CALCULATED.3IONS-SCNC: 6 MMOL/L (ref 3–14)
BUN SERPL-MCNC: 36 MG/DL (ref 7–30)
CALCIUM SERPL-MCNC: 7.6 MG/DL (ref 8.5–10.1)
CHLORIDE SERPL-SCNC: 102 MMOL/L (ref 94–109)
CO2 SERPL-SCNC: 27 MMOL/L (ref 20–32)
CREAT SERPL-MCNC: 0.81 MG/DL (ref 0.66–1.25)
ERYTHROCYTE [DISTWIDTH] IN BLOOD BY AUTOMATED COUNT: 13.1 % (ref 10–15)
ERYTHROCYTE [DISTWIDTH] IN BLOOD BY AUTOMATED COUNT: 13.2 % (ref 10–15)
GFR SERPL CREATININE-BSD FRML MDRD: >90 ML/MIN/1.7M2
GLUCOSE BLDC GLUCOMTR-MCNC: 114 MG/DL (ref 70–99)
GLUCOSE SERPL-MCNC: 115 MG/DL (ref 70–99)
HCT VFR BLD AUTO: 30.4 % (ref 40–53)
HCT VFR BLD AUTO: 31.5 % (ref 40–53)
HGB BLD-MCNC: 10.2 G/DL (ref 13.3–17.7)
HGB BLD-MCNC: 9.7 G/DL (ref 13.3–17.7)
LACTATE BLD-SCNC: 0.6 MMOL/L (ref 0.7–2)
MAGNESIUM SERPL-MCNC: 2 MG/DL (ref 1.6–2.3)
MCH RBC QN AUTO: 30.9 PG (ref 26.5–33)
MCH RBC QN AUTO: 31.3 PG (ref 26.5–33)
MCHC RBC AUTO-ENTMCNC: 31.9 G/DL (ref 31.5–36.5)
MCHC RBC AUTO-ENTMCNC: 32.4 G/DL (ref 31.5–36.5)
MCV RBC AUTO: 97 FL (ref 78–100)
MCV RBC AUTO: 97 FL (ref 78–100)
PLATELET # BLD AUTO: 189 10E9/L (ref 150–450)
PLATELET # BLD AUTO: 202 10E9/L (ref 150–450)
POTASSIUM SERPL-SCNC: 4.3 MMOL/L (ref 3.4–5.3)
RBC # BLD AUTO: 3.14 10E12/L (ref 4.4–5.9)
RBC # BLD AUTO: 3.26 10E12/L (ref 4.4–5.9)
SODIUM SERPL-SCNC: 135 MMOL/L (ref 133–144)
WBC # BLD AUTO: 12.1 10E9/L (ref 4–11)
WBC # BLD AUTO: 12.4 10E9/L (ref 4–11)

## 2018-03-23 PROCEDURE — 40000275 ZZH STATISTIC RCP TIME EA 10 MIN

## 2018-03-23 PROCEDURE — 80048 BASIC METABOLIC PNL TOTAL CA: CPT | Performed by: INTERNAL MEDICINE

## 2018-03-23 PROCEDURE — 25000125 ZZHC RX 250: Performed by: PHYSICIAN ASSISTANT

## 2018-03-23 PROCEDURE — 94640 AIRWAY INHALATION TREATMENT: CPT | Mod: 76

## 2018-03-23 PROCEDURE — 99223 1ST HOSP IP/OBS HIGH 75: CPT | Performed by: HOSPITALIST

## 2018-03-23 PROCEDURE — 93005 ELECTROCARDIOGRAM TRACING: CPT

## 2018-03-23 PROCEDURE — 85027 COMPLETE CBC AUTOMATED: CPT | Performed by: FAMILY MEDICINE

## 2018-03-23 PROCEDURE — 25000132 ZZH RX MED GY IP 250 OP 250 PS 637: Performed by: INTERNAL MEDICINE

## 2018-03-23 PROCEDURE — 99358 PROLONG SERVICE W/O CONTACT: CPT | Performed by: HOSPITALIST

## 2018-03-23 PROCEDURE — 25000128 H RX IP 250 OP 636: Performed by: FAMILY MEDICINE

## 2018-03-23 PROCEDURE — 25000128 H RX IP 250 OP 636: Performed by: NURSE PRACTITIONER

## 2018-03-23 PROCEDURE — 93010 ELECTROCARDIOGRAM REPORT: CPT | Performed by: INTERNAL MEDICINE

## 2018-03-23 PROCEDURE — 94640 AIRWAY INHALATION TREATMENT: CPT

## 2018-03-23 PROCEDURE — 83605 ASSAY OF LACTIC ACID: CPT | Performed by: INTERNAL MEDICINE

## 2018-03-23 PROCEDURE — 25000125 ZZHC RX 250: Performed by: INTERNAL MEDICINE

## 2018-03-23 PROCEDURE — 36415 COLL VENOUS BLD VENIPUNCTURE: CPT | Performed by: FAMILY MEDICINE

## 2018-03-23 PROCEDURE — 12000007 ZZH R&B INTERMEDIATE

## 2018-03-23 PROCEDURE — 87040 BLOOD CULTURE FOR BACTERIA: CPT | Performed by: FAMILY MEDICINE

## 2018-03-23 PROCEDURE — 83735 ASSAY OF MAGNESIUM: CPT | Performed by: INTERNAL MEDICINE

## 2018-03-23 PROCEDURE — 99232 SBSQ HOSP IP/OBS MODERATE 35: CPT | Performed by: INTERNAL MEDICINE

## 2018-03-23 PROCEDURE — 40000239 ZZH STATISTIC VAT ROUNDS

## 2018-03-23 PROCEDURE — 00000146 ZZHCL STATISTIC GLUCOSE BY METER IP

## 2018-03-23 PROCEDURE — 71045 X-RAY EXAM CHEST 1 VIEW: CPT

## 2018-03-23 PROCEDURE — 40000916 ZZH STATISTIC SITTER, NIGHT HOURS

## 2018-03-23 PROCEDURE — 25000128 H RX IP 250 OP 636: Performed by: INTERNAL MEDICINE

## 2018-03-23 PROCEDURE — 25000132 ZZH RX MED GY IP 250 OP 250 PS 637: Performed by: FAMILY MEDICINE

## 2018-03-23 RX ORDER — AMIODARONE HYDROCHLORIDE 200 MG/1
400 TABLET ORAL ONCE
Status: COMPLETED | OUTPATIENT
Start: 2018-03-23 | End: 2018-03-23

## 2018-03-23 RX ORDER — MORPHINE SULFATE 2 MG/ML
2-4 INJECTION, SOLUTION INTRAMUSCULAR; INTRAVENOUS
Status: DISCONTINUED | OUTPATIENT
Start: 2018-03-23 | End: 2018-03-24

## 2018-03-23 RX ORDER — LORAZEPAM 2 MG/ML
.5-1 INJECTION INTRAMUSCULAR EVERY 4 HOURS PRN
Status: DISCONTINUED | OUTPATIENT
Start: 2018-03-23 | End: 2018-03-26

## 2018-03-23 RX ORDER — PIPERACILLIN SODIUM, TAZOBACTAM SODIUM 3; .375 G/15ML; G/15ML
3.38 INJECTION, POWDER, LYOPHILIZED, FOR SOLUTION INTRAVENOUS EVERY 6 HOURS
Status: DISCONTINUED | OUTPATIENT
Start: 2018-03-23 | End: 2018-03-23

## 2018-03-23 RX ORDER — ACETAMINOPHEN 325 MG/1
650 TABLET ORAL EVERY 4 HOURS PRN
Status: DISCONTINUED | OUTPATIENT
Start: 2018-03-23 | End: 2018-03-27 | Stop reason: HOSPADM

## 2018-03-23 RX ORDER — ACETAMINOPHEN 650 MG/1
650 SUPPOSITORY RECTAL EVERY 4 HOURS PRN
Status: DISCONTINUED | OUTPATIENT
Start: 2018-03-23 | End: 2018-03-27 | Stop reason: HOSPADM

## 2018-03-23 RX ADMIN — Medication 2 G: at 05:32

## 2018-03-23 RX ADMIN — IPRATROPIUM BROMIDE AND ALBUTEROL SULFATE 3 ML: .5; 3 SOLUTION RESPIRATORY (INHALATION) at 07:17

## 2018-03-23 RX ADMIN — LORAZEPAM 1 MG: 2 INJECTION INTRAMUSCULAR; INTRAVENOUS at 16:52

## 2018-03-23 RX ADMIN — PIPERACILLIN SODIUM,TAZOBACTAM SODIUM 3.38 G: 3; .375 INJECTION, POWDER, FOR SOLUTION INTRAVENOUS at 10:32

## 2018-03-23 RX ADMIN — AMIODARONE HYDROCHLORIDE 400 MG: 200 TABLET ORAL at 05:04

## 2018-03-23 RX ADMIN — IPRATROPIUM BROMIDE AND ALBUTEROL SULFATE 3 ML: .5; 3 SOLUTION RESPIRATORY (INHALATION) at 11:29

## 2018-03-23 RX ADMIN — ACETAMINOPHEN 650 MG: 160 SUSPENSION ORAL at 03:14

## 2018-03-23 RX ADMIN — IPRATROPIUM BROMIDE AND ALBUTEROL SULFATE 3 ML: .5; 3 SOLUTION RESPIRATORY (INHALATION) at 15:17

## 2018-03-23 RX ADMIN — ENOXAPARIN SODIUM 60 MG: 60 INJECTION SUBCUTANEOUS at 05:34

## 2018-03-23 RX ADMIN — SODIUM CHLORIDE 500 ML: 9 INJECTION, SOLUTION INTRAVENOUS at 05:29

## 2018-03-23 RX ADMIN — PIPERACILLIN SODIUM,TAZOBACTAM SODIUM 3.38 G: 3; .375 INJECTION, POWDER, FOR SOLUTION INTRAVENOUS at 05:31

## 2018-03-23 RX ADMIN — BACITRACIN: 500 OINTMENT TOPICAL at 10:34

## 2018-03-23 NOTE — PROVIDER NOTIFICATION
Notified hospitalist regarding Afib 110's  SBP 80's  Respirations 40's. Lactic fired.  Lots of sputum requiring deep suctioning.   Made aware pt DNR/DNI.  Hospitalist recommended calling an RRT.

## 2018-03-23 NOTE — CODE/RAPID RESPONSE
"House MD note. Re: Tachycardia, fever, tachypnea.    85 year old male admitted to [facility] on 2-27 with abdominal pain, found to have a SBO. S/P exploratory laparoscopy with TISHA on 3-04. Was intubated until 3/11 but required reintubation on 3-12 and then extubated on 3-18. He developed post operative atrial fibrillation and started on Amiodarone. He converted to NSR and has remained in sinus rhythm until a short while ago when he again developed rapid atrial fibrillation. His HR now is 100-110 in A-fib. He was noted to have a low BP reading of 86/41 and with his rapid a-fib a RRT was called. In addition, he was noted to have an increased cough and increased respiratory rate with upper airway adventitious sounds. He has not vomited. He was treated for aspiration pneumonia with Zosyn this admission and has completed this course of antibiotics on 3-12. He now has a fever of 102.6. Deep suctioning was performed and yellowish colored phlegm was retrieved. He has tube feedings infusing now via a NJ tube. He remains encephalopathic and unable to provide a history. His RR has increased into the 30's and 40's at times but his oxygen saturations remain in the mid 90's on room air.    Exam: He is awake and appears in mild respiratory distress.  BP (!) 86/41  Pulse 101  Temp 102.6  F (39.2  C) (Rectal)  Resp (!) 40  Ht 1.753 m (5' 9\")  Wt 63.7 kg (140 lb 6.9 oz)  SpO2 93%  BMI 20.74 kg/m2  Heent: Grossly normal  Chest: CTA except for occasional rhonchi, no wheezing or rales   Cor: Irregular, irregular, no m/r/g  Abd: Soft, non tender, decreased bowel sounds.    CXR: No significant change from prior CXR on 3-16. Possible fibrosis. No obvious infiltrates. ( My reading )    Assessment: Recurrent atrial fibrillation with RVR, probable aspiration pneumonia.    Plan. Will restart Zosyn and obtain blood cultures times two now. He is scheduled to receive his next Amiodarone dose of 200 mg's at 0900 hours. Will give him a 400 mg oral " tab now and then discontinue his tube feedings until evaluated by his PMD this AM, considering his aspiration risk.  Will give him a 500 cc NS bolus now for his soft BP readings and tachycardia and he appears somewhat dehydrated. Discussed the above with the Hospitalist on call. Will defer further orders to his PMD in the AM.    Antione Bautista MD  3/23/2018  5:13 AM

## 2018-03-23 NOTE — PROVIDER NOTIFICATION
MD Notification    Person notified: primary hosptialist    Person Name: Dr. Aguillon    Date/Time: 3/23/18 at 0938    Interaction: web- based page    Purpose of Notification: Pt daughter called back, her cell number is: [phone]- she is awaiting your call.    Orders Received:    Comments:

## 2018-03-23 NOTE — PLAN OF CARE
Problem: Patient Care Overview  Goal: Plan of Care/Patient Progress Review  OT and PT: Per discussion with nursing, pt to have palliative consult today to discuss goals of care. Pt very confused and not appropriate for skilled therapy at this time-noted worsening condition. Will hold therapy at this time.

## 2018-03-23 NOTE — PROGRESS NOTES
Received a call from Dr. Keenan.that after speaking to the daughter, the decision is to pursue comfort cares and hospice. Daughter would like patient to be at same facility as his spouse which is [facility] in [place].

## 2018-03-23 NOTE — PROGRESS NOTES
[facility]    Hospitalist Progress Note    Assessment & Plan   Joseluis Falcon is a 85 year-old male with history of bilateral inguinal herniorrhaphy and psoriasis who presented to [facility] ED on 02/27/2018 with a 2-3 day history of nausea, vomiting and abdominal pain.  Workup revealed SBO, likely due to adhesions.  Failed nonoperative management for 6 days after which he was taken to the OR on 03/04/2018 and underwent exp lap, TISHA and partial small-bowel resection.  He has had a complicated postop course including extended intubation, pneumonia and prolonged ICU delirium. Hospitalist team resumed primary medical cares 3/20/18.       Small bowel obstruction secondary to adhesions, s/p exploratory laparoscopy with lysis of adhesions and small bowel resection, repair of small umbilical hernia 3/4/18  Presented with symptoms of nausea, vomiting and abdominal pain, found secondary to SBO which was in turn due to adhesions.  He failed nonoperative management and underwent above surgery. Post-op complicated by an ileus which has now resolved.    -General surgery following   -Post-operative cares per general surgery service     Acute metabolic encephalopathy  Prolonged delirium secondary to pneumonia and ICU delirium.  -Remains confused  -Continue scheduled quetiapine, will increase to 3 times daily  -Re-orient as needed  -Maintain normal sleep/wake and day/night cycle  -Avoid sedating/altering medications as able  -Mitts in place and sitter at bedside as pulling at lines     Severe malnutrition  The patient did not receive nutrition for greater than 5 days, thus was initiated on TPN, which has since weaned off.  He is now getting tube feed via NJ tube.  -If it currently on hold due to new concerns about aspiration  -Per the daughter Prema, they will not consider G-tube  -Further decision pending palliative care evaluation today      Acute hypercapnic respiratory failure  Required intubation for airway protection  for exploratory laparotomy, TISHA and partial small-bowel obstruction.  Able to extubate on 03/11 but reintubated on 03/12, and was ultimately able to extubate on 03/18.  CT chest with IV contrast on 03/06 was negative for PE, but revealed mild emphysema and fibrosis, (probably age-related).   -Currently off oxygen      Postoperative atrial fibrillation with RVR  Required amiodarone drip and rate controlling agent.   -Currently on oral amiodarone only.   -Now converted to normal sinus rhythm  -Electrophysiology followed patient initially and recommended heparin drip  -On enoxaparin 60 mg subcutaneous b.i.d. for stroke prophylaxis at the moment.        Aspiration pneumonia  Completed 7 day course of piperacillin-tazobactam IV and antibiotics was discontinued  -Patient had another episode of respiratory distress and fever last night  -Now back on Zosyn; continue for now, likely will de-escalate care and stop antibiotics pending palliative care evaluation.      Acute post-operative blood loss anemia, stable  Thrombocytopenia, resolved  -Hemoglobin stable  -Platelet count within normal limits     Acute kidney injury  Peak creatinine 1.37. Likely prerenal, responded well to IV fluid hydration.   -Creatinine stable    Goals of care:  -Patient continues to be encephalopathic and confused and disoriented.  Not making significant clinical improvement.  Discussed with daughter Prema on the phone, she is agreeable to palliative care evaluation and likely de-escalation of care.  She clearly mentioned that she would not want G-tube for her dad.  Patient has had NG tube for more than 2 weeks now.  Pending palliative care evaluation likely will proceed with comfort measures only.    Addendum:  Reviewed palliative care notes, patient now transitioned to comfort cares.  Discontinue IV antibiotics and Lovenox.   consult for hospice.          # Pain Assessment:   Current Pain Score 3/23/2018 3/23/2018 3/20/2018   Patient  currently in pain? LEANN no denies   Pain score (0-10) - - -   Pain location - - -   Pain descriptors - - -   CPOT pain score - - -   - Joseluis is unable to participate in a collaborative plan for pain management due to acute encephalopathy.   - Please see the plan for pain management as documented above      D/W: RN  DVT Prophylaxis: Enoxaparin (Lovenox) SQ  Code Status: DNR/DNI    Disposition: Expected discharge unclear, pending clinical progress, likely will take at least 2+ days    Gibson Aguillon MD    Interval History    Patient continues to be disoriented and confused.  Went into rapid A. Fib overnight. Fever to 102.6.  No change neurologically      -Data reviewed today: I reviewed all new labs and imaging results over the last 24 hours. I personally reviewed the EKG tracing showing nsr.      Physical Exam   Temp: 98.3  F (36.8  C) Temp src: Axillary BP: 109/56 Pulse: 101 Heart Rate: 113 Resp: 20 SpO2: 99 % O2 Device: None (Room air)    Vitals:    03/18/18 0400 03/19/18 0400 03/20/18 0400   Weight: 63.8 kg (140 lb 10.5 oz) 63.4 kg (139 lb 12.4 oz) 63.7 kg (140 lb 6.9 oz)     Vital Signs with Ranges  Temp:  [98.3  F (36.8  C)-102.6  F (39.2  C)] 98.3  F (36.8  C)  Pulse:  [] 101  Heart Rate:  [] 113  Resp:  [18-40] 20  BP: ()/(41-57) 109/56  SpO2:  [93 %-99 %] 99 %  I/O last 3 completed shifts:  In: 2101 [NG/GT:1025]  Out: 1250 [Urine:1250]    Constitutional: Awake, confused and disoriented, not following commands.  Mittens in place  HEENT: NG tube in place, no pallor or icterus  Neck- Supple, Good ROM, No JVD  Respiratory: Coarse breath sounds bilaterally, normal effort of breathing  Cardiovascular: RRR, No murmur  GI: Soft, Non- tender, abdominal incision with Steri-Strips, dry and clean.  Skin/Integument: Multiple bruising in different body parts  MSK: No joint deformity or swelling, no edema  Neuro: Appears to move all 4 extremities      Medications           piperacillin-tazobactam  3.375  g Intravenous Q6H     protein modular  1 packet Per Feeding Tube BID     QUEtiapine  25 mg Oral TID     bacitracin   Topical TID     amiodarone  200 mg Oral or FT or NG tube Daily     rantidine  150 mg Oral BID     enoxaparin  1 mg/kg Subcutaneous Q12H     ipratropium - albuterol 0.5 mg/2.5 mg/3 mL  3 mL Nebulization 4x daily     sodium chloride (PF)  10 mL Intracatheter Q8H       Data       Recent Labs  Lab 03/23/18  0515 03/23/18  0415 03/23/18  0210 03/22/18  1000 03/21/18  0600  03/19/18  0500   WBC 12.4* 12.1*  --   --  7.3  < >  --    HGB 9.7* 10.2*  --   --  9.8*  < >  --    MCV 97 97  --   --  97  < >  --     189  --   --  230  < >  --    INR  --   --   --   --   --   --  1.14   NA  --   --  135 136 138  < > 139   POTASSIUM  --   --  4.3 4.4 4.1  < > 4.5   CHLORIDE  --   --  102 104 105  < > 107   CO2  --   --  27 27 29  < > 29   BUN  --   --  36* 30 29  < > 35*   CR  --   --  0.81 0.71 0.67  < > 0.72   ANIONGAP  --   --  6 5 4  < > 3   TOBI  --   --  7.6* 7.7* 7.6*  < > 7.9*   GLC  --   --  115* 128* 107*  < > 112*   ALBUMIN  --   --   --   --   --   --  1.9*   PROTTOTAL  --   --   --   --   --   --  5.8*   BILITOTAL  --   --   --   --   --   --  0.4   ALKPHOS  --   --   --   --   --   --  176*   ALT  --   --   --   --   --   --  44   AST  --   --   --   --   --   --  35   < > = values in this interval not displayed.    Recent Results (from the past 24 hour(s))   XR Chest Port 1 View    Narrative    XR CHEST PORT 1 VW  3/23/2018 3:11 AM     HISTORY: Dyspnea.     COMPARISON: 3/16/2018.    FINDINGS: Upright portable chest. Right PICC is in place. Nasoenteric  feeding tube passes into the stomach. No pneumothorax. The heart size  is normal. The thoracic aorta is calcified and tortuous. There is  again prominent interstitial disease at the lung bases and periphery  of the lungs which may be chronic. The right hemidiaphragm is  elevated.      Impression    IMPRESSION: No acute abnormality. 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    STACY PATHAK MD

## 2018-03-23 NOTE — PLAN OF CARE
Problem: Patient Care Overview  Goal: Plan of Care/Patient Progress Review  Outcome: No Change  Tele conversion to AFIB.  BP in 80s and respirations in 40s. RRT activated. Bolus given and medications ordered. Sitter present. Due for palliative consult today.

## 2018-03-23 NOTE — PROGRESS NOTES
SPIRITUAL HEALTH SERVICES Progress Note  [facility]    Attempted (x2) to visit with pt.  He was sleeping and, per report, is confused.  He had a sitter at his bedside, and no family was present.  Will be available for support to pt/family, should needs arise.                                                                                                                                                 Anna Parry M.A.  Staff   Pager [phone]  Phone [phone]

## 2018-03-23 NOTE — CONSULTS
[facility]    Palliative Care Consultation Note    Patient: Joseluis Falcon  Date of Admission:  2/27/2018    Requesting provider/team: Dr Aguillon  Reason for consult: Goals of care    Recommendations:  Please see assessment below for rationale.  - transition to comfort cares   - discussed with daughter that antibiotics, artificial hydration and nutrition and all other medications that don't directly provide comfort will be discontinued    - daughter is interested in transferring her father to [facility] in [place], where his wife is. Care coordinator aware.   - please refer to hospice. [facility] might cooperate with a certain agency. Please discuss with SW    Thank you for the opportunity to participate in the care of this patient and family. Our team will follow. Please feel free to contact the on-call Palliative provider with any urgent needs.     Sailaja Keenan  Pager: [phone]  [facility] Inpatient Team Consult pager [phone] (M-F 8-4:30)  After-hours Answering Service [phone]   Palliative Clinic: [phone]       Assessment:  Joseluis Falcon is a 85 year old male with PMHx significant for bilateral inguinal herniorrhaphy and psoriasis, admitted 2/27 for small bowel obstruction.  Now status post surgical lysis of adhesions and partial small bowel resection on 3/4.  Postoperative course was complicated by extended intubation, aspiration pneumonia, ICU delirium.  At this time he remains confused, complicated by dysphasia with apparent aspiration.  He has been receiving tube feedings, which I held now due to aspiration risk.    Symptoms:   Pt appears comfortable.    Social:         Living situation: lived independently until admission. His wife is in a NH since a fall a year ago. She is doing relatively well, but unable to return to independent living.   He had been driving to visit her daily, did his own grocery shopping and took care of the home.         Support system:  "Their daughter Prema has been the main source of support.         Actual/Potential Caregiver: Prema    Coping: Prema shares her sadness about her father's condition and sudden decline, but is accepting. She has discussed his condition with her mother and is certain that he would not want further aggressive cares, namely no trach or artificial nutrition/hydration        Prognostic Information: Unless Edison regains his ability to swallow and take in meaningful amounts of hydration and nutrition, his prognosis is likely limited to days up to maybe very few weeks.   This has been discussed with Prema. We discussed the inherent uncertainty. Especially considering his sudden change in functional status there is a slight chance he might regain some function and live longer. I did recommend to offer family from out of town the opportunity to visit, though.    Advance Care Planning: DNR/DNI.  Changed after conversation by daughter Prema with Dr. Guidry.  Please see documentation dated 3/20 2:14 PM    History of Present Illness   Sources of History:electronic health record, patient's daughter and team    When I visit Edison's room he is asleep, eventually wakes up, but is not able to engage meaningfully. He has a 1:1 who states that he has appeared comfortable throughout.     I talk to his daughter Prema on the phone. She is aware of his condition and has a good understanding of his disease course. She shares how sudden this has happened and that he was independent and active up until this admission. She asked him about his wishes in the ED, when he said he wants to \"fight for his life\". He reiterated that sentiment several days later leading up to his surgery. We discuss that when he refers to his life he likely was envisioning independence and the ability to meaningfully interact with his family. She expresses that both she and her mother feel certain that he would not want to pursue further aggressive treatments, but to " transition to comfort cares.       ROS:  Palliative Symptom Review (0=no symptom/no concern, 1=mild, 2=moderate, 3=severe):  Unable to obtain 2/2 mental status       Past Medical History:   Past Medical History:   Diagnosis Date     Arthritis      Carlson's esophagus      GERD (gastroesophageal reflux disease)      Hard of hearing      Psoriasis      Skin cancer           Past Surgical History:   Past Surgical History:   Procedure Laterality Date     HERNIORRHAPHY INGUINAL Right 12/1/2015    Procedure: HERNIORRHAPHY INGUINAL;  Surgeon: Malcom Rasmussen MD;  Location:  [facility]     HERNIORRHAPHY INGUINAL Left 6/9/2017    Procedure: HERNIORRHAPHY INGUINAL;  OPEN LEFT INGUINAL HERNIA REPAIR WITH MESH ;  Surgeon: Aden Haro MD;  Location:  OR     LAPAROSCOPIC LYSIS ADHESIONS N/A 3/4/2018    Procedure: LAPAROSCOPIC LYSIS ADHESIONS;;  Surgeon: Robel Gomez MD;  Location:  OR     LAPAROSCOPIC RESECTION SMALL BOWEL N/A 3/4/2018    Procedure: LAPAROSCOPIC RESECTION SMALL BOWEL;;  Surgeon: Robel Gomez MD;  Location:  OR     LAPAROSCOPY DIAGNOSTIC (GENERAL) N/A 3/4/2018    Procedure: LAPAROSCOPY DIAGNOSTIC (GENERAL);  EXPLORATORY LAPAROSCOPY LINDA LYSIS OF ADHESION; SMALL BOWEL RESECTION;  Surgeon: Robel Gomez MD;  Location:  OR             Family History:   History reviewed. No pertinent family history.  Family history reviewed       Allergies:   Allergies   Allergen Reactions     Seasonal Allergies      Protonix [Pantoprazole] Rash            Medications:   I have reviewed this patient's medication profile and medications given in the past 24 hours.  Acetaminophen as needed-once  Hydromorphone 0.2 mg IV every hour as needed-none    Quetiapine 25 mg 3 times daily--a.m. dose held due to aspiration risk  Haldol 2 mg IV as needed agitation-once at 9 PM    Ondansetron as needed-none           Physical Exam:   Vital Signs: Temp: 98.3  F (36.8  C) Temp src: Axillary BP: 109/56  Pulse: 101 Heart Rate: 113 Resp: 20 SpO2: 99 % O2 Device: None (Room air)    Weight: 140 lbs 6.93 oz    Physical Exam:   CONSTIT: asleep, eventually arouses to voice and touch, appears comfortable  EENT: MMM, NGT in place, NC in place, EOMI, no icterus  RESP: reg, nl effort, some pharyngeal secretions, diffuse b/l coarse breath sounds  CARDIOVASC: irreg, no m/r/g  GI: mild distention, no obvious tenderness, no bowel sounds   :catheter in place, draining clear urine  MSK: moves x4, no edema, generalized weakness  SKIN:  warm, no rash, no obvious lesions  NEURO: follows commands, unclear state of orientation, mumbles. Good eye contact       Data reviewed:   GFR >90  Albumin 1.9    Sailaja Keenan  Pager:  [phone]  [facility] Inpatient Team Consult pager [phone] (M-F 8-4:30)  After-hours Answering Service [phone]  Palliative Clinic: [phone]     Total time spent was 90 minutes,  >50% of time was spent counseling and/or coordination of care regarding treatment plan.  I spent 30 minutes talking to Edison's daughter Prema on the phone 14:30-15:00

## 2018-03-23 NOTE — PROGRESS NOTES
Patient is on room air and SpO2 low to high 90`s. Scheduled neb tx given and tolerated well. BBS diminished. Will continue to follow.

## 2018-03-23 NOTE — PROGRESS NOTES
Patient called for RRT and deep suction per MD requested suctioned moderated amt of creamy white/yellow secretion. Patient is currently on room air with SpO2 93%. Will continue to monitor.      Savannah Li RT  3/23/2018

## 2018-03-23 NOTE — PLAN OF CARE
Problem: Patient Care Overview  Goal: Plan of Care/Patient Progress Review  Outcome: Declining  Pt is disorientated x4, VSS on RA, denies pain when more awake this afternoon, IV- PICC Saline locked.  Tele- afib with CVR and then will transition out to NSR. NPO at this time.  Due to possible aspiration NG is not being used for feedings and meds.  Pt has orders for bedrest and repositions self in bed.  Sitter at bedside- states pt is calm but can get a little agitated at times.  Palliative care team saw pt and is waiting for a call back from family in regards to changing orders as we are trending towards more comfort measures.  Skin is bruised and meplix's in place on extremities and coccyx.  Segal patent and draining yellow urine with good UOP.  At this time the plan is pending but per hospitalist daughter is in agreement with transitioning to comfort measures and possible dc to same facility that wife is in.    1500- RN talked with palliatives care team: at this time palliative care MD is on the phone with daughter and is talking about goals of care and they will call bedside RN (day or evening shift) back to confirm who will write updated active orders for pt (such as comfort cares)- if its palliative care team OR if RN should contact primary hospitalist.      1505- palliative care MD called back and she stated that she talked with daughter and she is in agreement with comfort cares.  Palliative care MD to contact Dr. Marquis and write new orders

## 2018-03-23 NOTE — PROGRESS NOTES
Surgery    Patient confused. No pain. Possible aspiration last night.    Abd- Soft. Non-tender    A/P  Probable aspiration PNA. Still extremely confused without noticeable improvement. Attempted to reach daughter without success. No further input from surgical perspective. Could place PEG tube to limit aspiration and facilitate NG removal if family plans to continue full care.    Robel Gomez M.D.  [facility]  [phone]

## 2018-03-23 NOTE — PLAN OF CARE
Problem: Patient Care Overview  Goal: Plan of Care/Patient Progress Review  Alert to self only, restless, agitated, haldol PRN and jean-pierre seroquel, sitter at bedside. Abd incision with steri-strips CDI/JASON. Isosource 1.5 started at 2230hrs,@30ml . PICC patent. Mepilex to LUE. New Mepilex applied to coccyx. No signs of pain. Segal patent, adequate UOP.

## 2018-03-24 ENCOUNTER — APPOINTMENT (OUTPATIENT)
Dept: SPEECH THERAPY | Facility: CLINIC | Age: 83
DRG: 329 | End: 2018-03-24
Attending: INTERNAL MEDICINE
Payer: COMMERCIAL

## 2018-03-24 LAB
GLUCOSE BLDC GLUCOMTR-MCNC: 89 MG/DL (ref 70–99)
INTERPRETATION ECG - MUSE: NORMAL

## 2018-03-24 PROCEDURE — 12000007 ZZH R&B INTERMEDIATE

## 2018-03-24 PROCEDURE — 25000128 H RX IP 250 OP 636: Performed by: INTERNAL MEDICINE

## 2018-03-24 PROCEDURE — 92610 EVALUATE SWALLOWING FUNCTION: CPT | Mod: GN

## 2018-03-24 PROCEDURE — 40000225 ZZH STATISTIC SLP WARD VISIT

## 2018-03-24 PROCEDURE — 40000239 ZZH STATISTIC VAT ROUNDS

## 2018-03-24 PROCEDURE — 99232 SBSQ HOSP IP/OBS MODERATE 35: CPT | Performed by: INTERNAL MEDICINE

## 2018-03-24 PROCEDURE — 00000146 ZZHCL STATISTIC GLUCOSE BY METER IP

## 2018-03-24 RX ADMIN — LORAZEPAM 1 MG: 2 INJECTION INTRAMUSCULAR; INTRAVENOUS at 17:53

## 2018-03-24 RX ADMIN — LORAZEPAM 1 MG: 2 INJECTION INTRAMUSCULAR; INTRAVENOUS at 05:46

## 2018-03-24 NOTE — PROGRESS NOTES
D: SW spoke to  hospice, who said they cannot meet with the family until Monday at 1100. SW will update patient's wife. Charge RN also updated.    ADDENDUM:    D: [facility].

## 2018-03-24 NOTE — PLAN OF CARE
Problem: Patient Care Overview  Goal: Plan of Care/Patient Progress Review  Discharge Planner SLP   Patient plan for discharge: Pending other therapies  Current status: Bedside swallow eval completed. Pt demonstrated mod-severe oropharyngeal dysphagia as evidenced by suspected premature pharyngeal entry of nectars resulting in immediate cough, repeated and slow swallows for all consistencies, decreased speed of hyolaryngeal elevation and sensation of honey thick liquids sticking in the pt's throat. Pt is requiring 3-4 swallows for each single tsp of nectars, honey thick and pureed foods. He requested multiple times for a glass of ice water, but SLP cannot safely make that a recommendation at this time d/t high risk of aspiration. RECS:  Depending on pt/family goals for agressiveness of care vs comfort measures, we could continue with a conservative diet of Purees and Honey Thick liquids by spoon only, ice chips sparingly for comfort, and continue to monitor tolerance. There is a possibility of completing a modified barium video swallow to objectively assess swallow function, but there is a high risk of aspiration of barium in this case. Finally, a third option for comfort measures could be followed with the pt consuming items that he desires. ST to follow pt 5x/wk for 1 week.   Barriers to return to prior living situation: Weakness, acuity of illness  Recommendations for discharge: Pending other therapy recs  Rationale for recommendations: Weakness       Entered by: Heather Wells 03/24/2018 2:39 PM

## 2018-03-24 NOTE — PLAN OF CARE
Problem: Bowel Obstruction (Adult)  Goal: Signs and Symptoms of Listed Potential Problems Will be Absent, Minimized or Managed (Bowel Obstruction)  Signs and symptoms of listed potential problems will be absent, minimized or managed by discharge/transition of care (reference Bowel Obstruction (Adult) CPG).   Confused.Comfort care/long arm sit.  Incision c/d/i.  BS+. Lungs clear. On room air. Feeding tube clamped.Daughter at bedside and patient states he wants something to eat. Dr. Chang here and talked with daughter. Speech here to evaluate. Will try pureed diet with honey thick fluid .Daughter notified Quiet this am and this afternoon trying to get out of bed. Turned and positioned. Segal patent.  5pm Restless this afternoon. Ativan 1 mg given

## 2018-03-24 NOTE — PLAN OF CARE
Problem: Patient Care Overview  Goal: Plan of Care/Patient Progress Review  OT: pt is now on comfort cares, see discharge summary

## 2018-03-24 NOTE — PLAN OF CARE
Problem: Patient Care Overview  Goal: Plan of Care/Patient Progress Review  Outcome: No Change  VSS, now comfort cares, Ativan given, with sitter, A+ O x 1, [place], wounds CDI, bedrest, NPO.

## 2018-03-24 NOTE — PLAN OF CARE
Problem: Patient Care Overview  Goal: Plan of Care/Patient Progress Review  Physical Therapy Discharge Summary    Reason for therapy discharge:    Change in medical status.  Transition to comfort care    Progress towards therapy goal(s). See goals on Care Plan in [facility] electronic health record for goal details.  Goals not met.  Barriers to achieving goals:   transition to comfort care.    Therapy recommendation(s):    Defer to medical team.

## 2018-03-24 NOTE — PROGRESS NOTES
[facility]    Hospitalist Progress Note    Assessment & Plan   Joseluis Falcon is a 85 year-old male with history of bilateral inguinal herniorrhaphy and psoriasis who presented to [facility] ED on 02/27/2018 with a 2-3 day history of nausea, vomiting and abdominal pain.  Workup revealed SBO, likely due to adhesions.  Failed nonoperative management for 6 days after which he was taken to the OR on 03/04/2018 and underwent exp lap, TISHA and partial small-bowel resection.  He has had a complicated postop course including extended intubation, pneumonia and prolonged ICU delirium. Hospitalist team resumed primary medical cares 3/20/18.       Small bowel obstruction secondary to adhesions, s/p exploratory laparoscopy with lysis of adhesions and small bowel resection, repair of small umbilical hernia 3/4/18  Presented with symptoms of nausea, vomiting and abdominal pain, found secondary to SBO which was in turn due to adhesions.  He failed nonoperative management and underwent above surgery.   -Post-op complicated by an ileus which has now resolved.    -Appreciate general surgery inputs; following peripherally    Acute metabolic encephalopathy  Prolonged delirium secondary to pneumonia and ICU delirium.  -Remains confused; however some improvement in the last 24 hour  -Patient not agitated and combative anymore and following directions for the most part     Severe malnutrition  The patient did not receive nutrition for greater than 5 days, thus was initiated on TPN, which has since weaned off.    -Tube feet currently on hold, per the daughter Prema, G-tube is not an option at this time although she is still discussing with other family members be assisting clinical situation  -Patient has had NG tube for 20 days now, will have to consider alternative source of nutrition  -Speech evaluation today      Acute hypercapnic respiratory failure  Required intubation for airway protection for exploratory laparotomy, TISHA and  partial small-bowel obstruction.  Able to extubate on 03/11 but reintubated on 03/12, and was ultimately able to extubate on 03/18.  CT chest with IV contrast on 03/06 was negative for PE, but revealed mild emphysema and fibrosis, (probably age-related).   -Currently off oxygen      Postoperative atrial fibrillation with RVR  Required amiodarone drip and rate controlling agent.   -Currently on oral amiodarone only.   -Now converted to normal sinus rhythm  -Electrophysiology followed patient initially   -Currently off anticoagulation as the patient was transitioned to comfort cares 3/23 which has now subsequently been reversed.  Please see below       Aspiration pneumonia  Completed 7 day course of piperacillin-tazobactam IV and antibiotics was discontinued  -Patient had another episode of respiratory distress and fever 3/23  -Was back on Zosyn; which was subsequently discontinued due to decision to pursue comfort measures.    Acute kidney injury  Peak creatinine 1.37. Likely prerenal, responded well to IV fluid hydration.   -Creatinine stable    Goals of care:  -Patient continues had prolonged encephalopathy and delirium and initially due to lack of progress the decision was made to pursue comfort measures on 3/23.  He was also evaluated by palliative care.  -However in the last 24 hours patient has been a little more lucid and interactive and improved  -Adjust with daughter Prema again today, she wants to reevaluate again in the next 24 hours to get a better direction for his ongoing care  -For the moment decision has been made to reverse comfort cares.  -Speech evaluation today, that will largely decide the direction of care      # Pain Assessment:   Current Pain Score 3/23/2018 3/23/2018 3/23/2018   Patient currently in pain? denies LEANN no   Pain score (0-10) - - -   Pain location - - -   Pain descriptors - - -   CPOT pain score - - -   - Joseluis is unable to participate in a collaborative plan for pain management  due to acute encephalopathy.   - Please see the plan for pain management as documented above      D/W: RN  DVT Prophylaxis: Enoxaparin (Lovenox) SQ  Code Status: DNR/DNI    Disposition: Expected discharge pending clinical progress, likely will take 1-2 days    Gibson Aguillon MD    Interval History    Patient much more lucid, calm and interactive today.  He was requesting food this morning.  Denies new complaints.  Afebrile      -Data reviewed today: I reviewed all new labs and imaging results over the last 24 hours. I personally reviewed the EKG tracing showing nsr.      Physical Exam   Temp: 97.9  F (36.6  C) Temp src: Axillary BP: 92/60 Pulse: 71 Heart Rate: 69 Resp: 20 SpO2: 94 % O2 Device: None (Room air)    Vitals:    03/18/18 0400 03/19/18 0400 03/20/18 0400   Weight: 63.8 kg (140 lb 10.5 oz) 63.4 kg (139 lb 12.4 oz) 63.7 kg (140 lb 6.9 oz)     Vital Signs with Ranges  Temp:  [97.9  F (36.6  C)-98.9  F (37.2  C)] 97.9  F (36.6  C)  Pulse:  [71] 71  Heart Rate:  [62-73] 69  Resp:  [20] 20  BP: (86-97)/(47-60) 92/60  SpO2:  [92 %-94 %] 94 %  I/O last 3 completed shifts:  In: 0   Out: 1000 [Urine:1000]    Constitutional: Awake, was able to tell me his full name, date of birth and was oriented to place, much calmer, following commands more appropriately  HEENT: NG tube in place, no pallor or icterus  Neck- Supple, Good ROM, No JVD  Respiratory: Coarse breath sounds bilaterally, normal effort of breathing  Cardiovascular: RRR, No murmur  GI: Soft, Non- tender  Skin/Integument: Multiple bruising in different body parts  MSK: No joint deformity or swelling, no edema  Neuro: Appears to move all 4 extremities      Medications             Data       Recent Labs  Lab 03/23/18  0515 03/23/18  0415 03/23/18  0210 03/22/18  1000 03/21/18  0600  03/19/18  0500   WBC 12.4* 12.1*  --   --  7.3  < >  --    HGB 9.7* 10.2*  --   --  9.8*  < >  --    MCV 97 97  --   --  97  < >  --     189  --   --  230  < >  --    INR   --   --   --   --   --   --  1.14   NA  --   --  135 136 138  < > 139   POTASSIUM  --   --  4.3 4.4 4.1  < > 4.5   CHLORIDE  --   --  102 104 105  < > 107   CO2  --   --  27 27 29  < > 29   BUN  --   --  36* 30 29  < > 35*   CR  --   --  0.81 0.71 0.67  < > 0.72   ANIONGAP  --   --  6 5 4  < > 3   TOBI  --   --  7.6* 7.7* 7.6*  < > 7.9*   GLC  --   --  115* 128* 107*  < > 112*   ALBUMIN  --   --   --   --   --   --  1.9*   PROTTOTAL  --   --   --   --   --   --  5.8*   BILITOTAL  --   --   --   --   --   --  0.4   ALKPHOS  --   --   --   --   --   --  176*   ALT  --   --   --   --   --   --  44   AST  --   --   --   --   --   --  35   < > = values in this interval not displayed.    No results found for this or any previous visit (from the past 24 hour(s)).

## 2018-03-24 NOTE — PLAN OF CARE
Problem: Patient Care Overview  Goal: Plan of Care/Patient Progress Review  Outcome: No Change  Patient on comfort cares, long-arm sit, VSS, denies pain. Confused. Gave Ativan x1. Potential transfer to hospice later today.

## 2018-03-24 NOTE — PROGRESS NOTES
03/24/18 1400   General Information   Onset Date 02/27/18   Start of Care Date 03/24/18   Referring Physician Gibson Aguillon MD   Patient Profile Review/OT: Additional Occupational Profile Info See Profile for full history and prior level of function   Patient/Family Goals Statement To drink a large glass of ice water   Swallowing Evaluation Bedside swallow evaluation   Behaviorial Observations WFL (within functional limits)   Mode of current nutrition NPO;NG   Comments Joseluis Falcon is a 85 year old male with PMHx significant for bilateral inguinal herniorrhaphy and psoriasis, admitted 2/27 for small bowel obstruction.  Now status post surgical lysis of adhesions and partial small bowel resection on 3/4.  Postoperative course was complicated by extended intubation, aspiration pneumonia, ICU delirium.   Clinical Swallow Evaluation   Oral Musculature generally intact   Structural Abnormalities none present   Dentition present and adequate   Mucosal Quality good;dry   Mandibular Strength and Mobility intact   Oral Labial Strength and Mobility WFL   Lingual Strength and Mobility WFL   Additional Documentation Yes   Clinical Swallow Eval: Nectar Thick Liquid Texture Trial   Mode of Presentation, Nectar spoon   Volume of Nectar Presented 2 TB   Oral Phase, [place] Poor AP movement;Premature pharyngeal entry   Pharyngeal Phase, Nectar coughing/choking;impaired;repeated swallows   Diagnostic Statement mod-severe oropharyngeal dysphagia   Clinical Swallow Eval: Honey Thick Liquid Texture Trial   Mode of Presentation, Honey spoon   Volume of Honey Presented 5 TB   Oral Phase, Honey Poor AP movement;Premature pharyngeal entry   Pharyngeal Phase, Honey impaired;repeated swallows;feeling of something stuck in throat   Diagnostic Statement mod-severe oropharyngeal dysphagia   Clinical Swallow Eval: Puree Solid Texture Trial   Mode of Presentation, Puree spoon   Volume of Puree Presented 2 TB   Oral Phase, Puree WFL    Pharyngeal Phase, Puree feeling of something stuck in throat;repeated swallows;impaired   Diagnostic Statement mod-severe oropharyngeal dysphagia   Esophageal Phase of Swallow   Patient reports or presents with symptoms of esophageal dysphagia No   General Therapy Interventions   Planned Therapy Interventions Dysphagia Treatment   Dysphagia treatment Modified diet education;Instruction of safe swallow strategies;Compensatory strategies for swallowing   Swallow Eval: Clinical Impressions   Skilled Criteria for Therapy Intervention Skilled criteria met.  Treatment indicated.   Functional Assessment Scale (FAS) 2   Treatment Diagnosis mod-severe oropharyngeal dysphagia   Diet texture recommendations Dysphagia diet level 1;Honey thick liquids  (by spoon only)   Recommended Feeding/Eating Techniques alternate between small bites and sips of food/liquid;check mouth frequently for oral residue/pocketing;maintain upright posture during/after eating for 30 mins;no straws;small sips/bites   Therapy Frequency 5 times/wk   Predicted Duration of Therapy Intervention (days/wks) 1 wk   Anticipated Discharge Disposition (pending other therapies)   Risks and Benefits of Treatment have been explained. Yes   Patient, family and/or staff in agreement with Plan of Care Yes   Clinical Impression Comments Bedside swallow eval completed. Pt demonstrated mod-severe oropharyngeal dysphagia as evidenced by suspected premature pharyngeal entry of nectars resulting in immediate cough, repeated and slow swallows for all consistencies, decreased speed of hyolaryngeal elevation and sensation of honey thick liquids sticking in the pt's throat. Pt is requiring 3-4 swallows for each single tsp of nectars, honey thick and pureed foods. He requested multiple times for a glass of ice water, but SLP cannot safely make that a recommendation at this time d/t high risk of aspiration. RECS:  Depending on pt/family goals for agressiveness of care vs comfort  measures, we could continue with a conservative diet of Purees and Honey Thick liquids by spoon only, ice chips sparingly for comfort, and continue to monitor tolerance. There is a possibility of completing a modified barium video swallow to objectively assess swallow function, but there is a high risk of aspiration of barium in this case. Finally, a third option for comfort measures could be followed with the pt consuming items that he desires. ST to follow pt 5x/wk for 1 week.    Total Evaluation Time   Total Evaluation Time (Minutes) 25

## 2018-03-25 ENCOUNTER — APPOINTMENT (OUTPATIENT)
Dept: SPEECH THERAPY | Facility: CLINIC | Age: 83
DRG: 329 | End: 2018-03-25
Attending: INTERNAL MEDICINE
Payer: COMMERCIAL

## 2018-03-25 PROCEDURE — 12000007 ZZH R&B INTERMEDIATE

## 2018-03-25 PROCEDURE — 92526 ORAL FUNCTION THERAPY: CPT | Mod: GN

## 2018-03-25 PROCEDURE — 40000239 ZZH STATISTIC VAT ROUNDS

## 2018-03-25 PROCEDURE — 99232 SBSQ HOSP IP/OBS MODERATE 35: CPT | Performed by: INTERNAL MEDICINE

## 2018-03-25 PROCEDURE — 40000225 ZZH STATISTIC SLP WARD VISIT

## 2018-03-25 PROCEDURE — 25000132 ZZH RX MED GY IP 250 OP 250 PS 637: Performed by: INTERNAL MEDICINE

## 2018-03-25 RX ORDER — FAMOTIDINE 10 MG
10 TABLET ORAL 2 TIMES DAILY
Status: DISCONTINUED | OUTPATIENT
Start: 2018-03-25 | End: 2018-03-27 | Stop reason: HOSPADM

## 2018-03-25 RX ORDER — TAMSULOSIN HYDROCHLORIDE 0.4 MG/1
0.4 CAPSULE ORAL DAILY
Status: DISCONTINUED | OUTPATIENT
Start: 2018-03-25 | End: 2018-03-26

## 2018-03-25 RX ADMIN — FAMOTIDINE 10 MG: 10 TABLET, FILM COATED ORAL at 21:16

## 2018-03-25 RX ADMIN — TAMSULOSIN HYDROCHLORIDE 0.4 MG: 0.4 CAPSULE ORAL at 13:51

## 2018-03-25 RX ADMIN — APIXABAN 2.5 MG: 2.5 TABLET, FILM COATED ORAL at 21:16

## 2018-03-25 NOTE — PROGRESS NOTES
[facility]    Hospitalist Progress Note    Assessment & Plan   Joseluis Falcon is a 85 year-old male with history of bilateral inguinal herniorrhaphy and psoriasis who presented to [facility] ED on 02/27/2018 with a 2-3 day history of nausea, vomiting and abdominal pain.  Workup revealed SBO, likely due to adhesions.  Failed nonoperative management for 6 days after which he was taken to the OR on 03/04/2018 and underwent exp lap, TISHA and partial small-bowel resection.  He has had a complicated postop course including extended intubation, pneumonia and prolonged ICU delirium. Hospitalist team resumed primary medical cares 3/20/18.       Small bowel obstruction secondary to adhesions, s/p exploratory laparoscopy with lysis of adhesions and small bowel resection, repair of small umbilical hernia 3/4/18  Presented with symptoms of nausea, vomiting and abdominal pain, found secondary to SBO which was in turn due to adhesions.  He failed nonoperative management and underwent above surgery.   -Post-op complicated by an ileus which has now resolved.    -Appreciate general surgery inputs; following peripherally    Acute metabolic encephalopathy  Prolonged delirium secondary to pneumonia and ICU delirium.  -Remains confused; however ongoing improvement in the last 48 hour  -Patient not agitated and combative anymore and following directions for the most part     Severe malnutrition  The patient did not receive nutrition for greater than 5 days, thus was initiated on TPN, which has since weaned off.    -Tube feet currently on hold, per the daughter Prema, G-tube is not an option at this time although she is still discussing with other family members be assisting clinical situation  -Appreciate speech evaluation  ideally would prefer n.p.o patient still at very high risk for aspiration, however given the clinical circumstances discussion with the daughter at the bedside plan is to allow puréed diet and honey thick  liquids.  -Discontinue NG tube  -Daughter aware that patient still at considerable aspiration risk but would like to take the chance and allow him to eat.    Acute hypercapnic respiratory failure  Required intubation for airway protection for exploratory laparotomy, TISHA and partial small-bowel obstruction.  Able to extubate on 03/11 but reintubated on 03/12, and was ultimately able to extubate on 03/18.  CT chest with IV contrast on 03/06 was negative for PE, but revealed mild emphysema and fibrosis, (probably age-related).   -Currently off oxygen      Postoperative atrial fibrillation with RVR  Required amiodarone drip and rate controlling agent.   -Now converted to normal sinus rhythm  -Electrophysiology followed patient initially   -Initially plan was for comfort measures however due to improvement in clinical condition, discussed with the daughter at the bedside, she would prefer the patient to be on anticoagulation to prevent stroke risk  -Start Eliquis 2.5 mg p.o. twice daily      Aspiration pneumonia  Completed 7 day course of piperacillin-tazobactam IV and antibiotics was discontinued  -Patient had another episode of respiratory distress and fever 3/23  -Off antibiotics at this time, afebrile and doing well    Acute kidney injury  Peak creatinine 1.37. Likely prerenal, responded well to IV fluid hydration.   -Creatinine stable    Goals of care:  -Patient  had prolonged encephalopathy and delirium and initially due to lack of progress the decision was made to pursue comfort measures on 3/23.  He was also evaluated by palliative care.  -However in the last 48 hours patient has been a  more lucid and interactive and improved  -Discussed with daughter Prema again today, will continue current diet as described above.  -Plan is now to discharged to care facility with hospice.  Continue current level of care which includes anticoagulation for A. fib and current diet orders.      # Pain Assessment:   Current Pain  Score 3/24/2018 3/24/2018 3/23/2018   Patient currently in pain? denies denies denies   Pain score (0-10) - - -   Pain location - - -   Pain descriptors - - -   CPOT pain score - - -   - Joseluis is unable to participate in a collaborative plan for pain management due to acute encephalopathy.   - Please see the plan for pain management as documented above      D/W: RN  DVT Prophylaxis: Enoxaparin (Lovenox) SQ  Code Status: DNR/DNI    Disposition: Expected discharge 3/26  Gibson Aguillon MD    Interval History    Patient continues to be lucid, calm and interactive today.  Now on puréed diet, reports suppressed appetite.  No new complaint    -Data reviewed today: I reviewed all new labs and imaging results over the last 24 hours. I personally reviewed the EKG tracing showing nsr.      Physical Exam   Temp: 97.9  F (36.6  C) Temp src: Oral BP: 112/71 Pulse: 66 Heart Rate: 65 Resp: 16 SpO2: 97 % O2 Device: None (Room air)    Vitals:    03/18/18 0400 03/19/18 0400 03/20/18 0400   Weight: 63.8 kg (140 lb 10.5 oz) 63.4 kg (139 lb 12.4 oz) 63.7 kg (140 lb 6.9 oz)     Vital Signs with Ranges  Temp:  [97.3  F (36.3  C)-98.8  F (37.1  C)] 97.9  F (36.6  C)  Pulse:  [64-71] 66  Heart Rate:  [65-69] 65  Resp:  [16-18] 16  BP: (112-130)/(56-71) 112/71  SpO2:  [94 %-98 %] 97 %  I/O last 3 completed shifts:  In: 50 [P.O.:50]  Out: 540 [Urine:540]    Constitutional: Awake, calm, interactive, answering simple questions appropriately  Respiratory: Coarse breath sounds bilaterally, normal effort of breathing  Cardiovascular: RRR, No murmur  GI: Soft, Non- tender  Skin/Integument: Multiple bruising in different body parts  MSK: No joint deformity or swelling, no edema  Neuro: Appears to move all 4 extremities      Medications           tamsulosin  400 mcg Oral Daily       Data       Recent Labs  Lab 03/23/18  0515 03/23/18  0415 03/23/18  0210 03/22/18  1000 03/21/18  0600  03/19/18  0500   WBC 12.4* 12.1*  --   --  7.3  < >  --    HGB  9.7* 10.2*  --   --  9.8*  < >  --    MCV 97 97  --   --  97  < >  --     189  --   --  230  < >  --    INR  --   --   --   --   --   --  1.14   NA  --   --  135 136 138  < > 139   POTASSIUM  --   --  4.3 4.4 4.1  < > 4.5   CHLORIDE  --   --  102 104 105  < > 107   CO2  --   --  27 27 29  < > 29   BUN  --   --  36* 30 29  < > 35*   CR  --   --  0.81 0.71 0.67  < > 0.72   ANIONGAP  --   --  6 5 4  < > 3   TOBI  --   --  7.6* 7.7* 7.6*  < > 7.9*   GLC  --   --  115* 128* 107*  < > 112*   ALBUMIN  --   --   --   --   --   --  1.9*   PROTTOTAL  --   --   --   --   --   --  5.8*   BILITOTAL  --   --   --   --   --   --  0.4   ALKPHOS  --   --   --   --   --   --  176*   ALT  --   --   --   --   --   --  44   AST  --   --   --   --   --   --  35   < > = values in this interval not displayed.    No results found for this or any previous visit (from the past 24 hour(s)).

## 2018-03-25 NOTE — PLAN OF CARE
Problem: Patient Care Overview  Goal: Plan of Care/Patient Progress Review  Outcome: No Change  VSS on RA. Denies any pain. Pt on comfort cares, and long-arm sit. BS active, incontinent of soft BM x1. Segal intact. Pt confused, repositioned every 2-3 hrs. Pt is on dysphagia level 3 and pureed food.

## 2018-03-25 NOTE — PLAN OF CARE
Problem: Patient Care Overview  Goal: Plan of Care/Patient Progress Review  Discharge Planner SLP   Patient plan for discharge: not stated  Current status: Pt is currently on dysphagia diet 3, although last speech recommended for oral diet was purees (dysphaiga diet 1) and honey thick. Pt demonstrated mod-severe oropharyngeal dysphagia as evidenced by suspected premature pharyngeal entry of thins resulting in immediate cough, repeated and slow swallows for all consistencies of honey thick and scrambled egg, decreased speed of hyolaryngeal elevation. Pt is requiring 3-4 swallows for each bolus, and there was mild-mod oral residue on tongue blade with scrambled egg. This cleared with trial of honey thick liquids. He requested multiple times for a glass of ice water and several sips were given to the pt by cup with 75% coughing. RECS: Pt still at very high risk of aspiration. Recommend pureed diet with honey thick liquids if pt/family want to continue oral diet. Can give pt free water protocol (thin water between meals only, and only after good oral care) with assistance. Prognosis is guarded. Please refer to bold section in Speech note from 3/24/18 for other considerations for POC.   Barriers to return to prior living situation: Weakness, acuity of illness  Recommendations for discharge: skilled nursing facility  Rationale for recommendations: weakness, dysphagia       Entered by: Heather Wells 03/25/2018 9:55 AM

## 2018-03-25 NOTE — PROVIDER NOTIFICATION
Hospitalist Pal paged per request: Poor appetite in AM, only had few bites & a few sips. Speech therapist also saw pt, see note. Have not seen pt's daughter yet.

## 2018-03-25 NOTE — PLAN OF CARE
Problem: Patient Care Overview  Goal: Plan of Care/Patient Progress Review  Outcome: Improving  A/O to self only. Long arm sit in place, impulsive at times. NG tube out & Segal removed at 1400 for voiding trial, per MD. Pt on purees and honey thickened liquids, needs help with feeding. Also able to have free water in between meals after good oral care, per speech therapist. Poor appetite, but able to eat a few bites. Walked to BR, had BM. Possible d/c to TCU tomorrow. PICC saline locked.

## 2018-03-26 PROCEDURE — 40000915 ZZH STATISTIC SITTER, EVENING HOURS

## 2018-03-26 PROCEDURE — 25000132 ZZH RX MED GY IP 250 OP 250 PS 637: Performed by: INTERNAL MEDICINE

## 2018-03-26 PROCEDURE — 12000007 ZZH R&B INTERMEDIATE

## 2018-03-26 PROCEDURE — 93010 ELECTROCARDIOGRAM REPORT: CPT | Performed by: INTERNAL MEDICINE

## 2018-03-26 PROCEDURE — 99232 SBSQ HOSP IP/OBS MODERATE 35: CPT | Performed by: INTERNAL MEDICINE

## 2018-03-26 PROCEDURE — 40000239 ZZH STATISTIC VAT ROUNDS

## 2018-03-26 PROCEDURE — 93005 ELECTROCARDIOGRAM TRACING: CPT

## 2018-03-26 PROCEDURE — 25000128 H RX IP 250 OP 636: Performed by: INTERNAL MEDICINE

## 2018-03-26 RX ORDER — LORAZEPAM 2 MG/ML
.5-1 INJECTION INTRAMUSCULAR EVERY 8 HOURS PRN
Status: DISCONTINUED | OUTPATIENT
Start: 2018-03-26 | End: 2018-03-27

## 2018-03-26 RX ADMIN — LORAZEPAM 0.5 MG: 2 INJECTION INTRAMUSCULAR; INTRAVENOUS at 19:01

## 2018-03-26 RX ADMIN — FAMOTIDINE 10 MG: 10 TABLET, FILM COATED ORAL at 08:24

## 2018-03-26 RX ADMIN — APIXABAN 2.5 MG: 2.5 TABLET, FILM COATED ORAL at 08:24

## 2018-03-26 NOTE — PLAN OF CARE
Problem: Patient Care Overview  Goal: Plan of Care/Patient Progress Review  Outcome: Improving  A/O to self only cooperative, tolerating small amounts of DD1 w/honey thick liquids, pills crushed, repositioning independently in bed, adequate UOP incontinent, Bowel sounds active, +flatus, incisions WDL, plan possible D/C to TCU when bed is available

## 2018-03-26 NOTE — PROGRESS NOTES
SW: SW spoke to admissions at [facility], updating her on patient progress and plan to not pursue Hospice at this time but to go forth with original plan for TCU. Patient family prefers this facility as patient's wife also lives there. Adm's to further discuss with nursing team and call if needing more nursing information. SW provided nurse station phone #. Awaiting return call.    ADDENDUM: SW spoke to dtr via telephone and then with patient to discuss TCU options. Shared with dtr patient request for [facility]. Dtr to speak with father this afternoon to assist with TCU decision. Referrals sent to [facility] and [facility] per pt/family choices.

## 2018-03-26 NOTE — PROGRESS NOTES
[facility]    Hospitalist Progress Note    Assessment & Plan   Joseluis Falcon is a 85 year-old male with history of bilateral inguinal herniorrhaphy and psoriasis who presented to [facility] ED on 02/27/2018 with a 2-3 day history of nausea, vomiting and abdominal pain.  Workup revealed SBO, likely due to adhesions.  Failed nonoperative management for 6 days after which he was taken to the OR on 03/04/2018 and underwent exp lap, TISHA and partial small-bowel resection.  He has had a complicated postop course including extended intubation, pneumonia and prolonged ICU delirium. Hospitalist team resumed primary medical cares 3/20/18.       Small bowel obstruction secondary to adhesions, s/p exploratory laparoscopy with lysis of adhesions and small bowel resection, repair of small umbilical hernia 3/4/18  Presented with symptoms of nausea, vomiting and abdominal pain, found secondary to SBO which was in turn due to adhesions.  He failed nonoperative management and underwent above surgery.   -Post-op complicated by an ileus which has now resolved.    -Appreciate general surgery inputs; following peripherally  -Will arrange surgical follow-up at discharge now that plans for hospice/comfort measures have been reversed    Acute metabolic encephalopathy  Prolonged delirium secondary to pneumonia and ICU delirium.  -Remained  confused; however ongoing improvement since 3/24  -Patient not agitated and combative anymore and following directions for the most part     Severe malnutrition  -Initially was on TPN due to prolonged delirium and ICU stay  -Daughter aware that patient still at considerable aspiration risk but would like to take the chance and allow him to eat.    Acute hypercapnic respiratory failure  Required intubation for airway protection for exploratory laparotomy, TISHA and partial small-bowel obstruction.  Able to extubate on 03/11 but reintubated on 03/12, and was ultimately able to extubate on 03/18.  CT  chest with IV contrast on 03/06 was negative for PE, but revealed mild emphysema and fibrosis, (probably age-related).   -Currently off oxygen      Postoperative atrial fibrillation with RVR  Required amiodarone drip and rate controlling agent.   -Now converted to normal sinus rhythm  -Electrophysiology followed patient initially   -Initially plan was for comfort measures however due to improvement in clinical condition, discussed with the daughter at the bedside, she would prefer the patient to be on anticoagulation to prevent stroke risk  -Started Ruben;d/w Dr Malloy, who felt that would be appropriate.Continue at DC    Aspiration pneumonia  Completed 7 day course of piperacillin-tazobactam IV and antibiotics was discontinued  -Patient had another episode of respiratory distress and fever 3/23  -Off antibiotics at this time, afebrile and doing well    Acute kidney injury  Peak creatinine 1.37. Likely prerenal, responded well to IV fluid hydration.   -Creatinine stable    Goals of care:  -Patient  had prolonged encephalopathy and delirium and initially due to lack of progress the decision was made to pursue comfort measures on 3/23.  He was also evaluated by palliative care.  -However in the last 72 hours patient has been more lucid and interactive and improved  -daughter Prema now wants pt going to TCU.Comfort measures decision reversed.  -Plan is now to discharged toTCU, possibly hospice eval down the road      # Pain Assessment:   Current Pain Score 3/26/2018 3/25/2018 3/25/2018   Patient currently in pain? denies denies denies   Pain score (0-10) - - -   Pain location - - -   Pain descriptors - - -   CPOT pain score - - -   - Joseluis is unable to participate in a collaborative plan for pain management due to acute encephalopathy.   - Please see the plan for pain management as documented above      D/W: RN  DVT Prophylaxis: Enoxaparin (Lovenox) SQ  Code Status: DNR/DNI    Disposition: Expected discharge  3/27    Gibson Aguillon MD    Interval History    Patient continues to be lucid, intermittently confused and disoriented. Now on puréed diet, reports suppressed appetite.  No new complaints    -Data reviewed today: I reviewed all new labs and imaging results over the last 24 hours. I personally reviewed the EKG tracing showing nsr.      Physical Exam   Temp: 98.5  F (36.9  C) Temp src: Oral BP: 97/57 Pulse: 58 Heart Rate: 57 Resp: 16 SpO2: 96 % O2 Device: None (Room air)    Vitals:    03/18/18 0400 03/19/18 0400 03/20/18 0400   Weight: 63.8 kg (140 lb 10.5 oz) 63.4 kg (139 lb 12.4 oz) 63.7 kg (140 lb 6.9 oz)     Vital Signs with Ranges  Temp:  [98  F (36.7  C)-98.8  F (37.1  C)] 98.5  F (36.9  C)  Pulse:  [58-59] 58  Heart Rate:  [57-66] 57  Resp:  [16] 16  BP: ()/(44-63) 97/57  SpO2:  [93 %-97 %] 96 %  I/O last 3 completed shifts:  In: 60 [P.O.:60]  Out: -     Constitutional: Awake, calm, interactive, answering simple questions appropriately  Respiratory: Coarse breath sounds bilaterally, normal effort of breathing  Cardiovascular: RRR, No murmur  GI: Soft, Non- tender  Skin/Integument: Multiple bruising in different body parts  MSK: No joint deformity or swelling, no edema  Neuro: Appears to move all 4 extremities      Medications           tamsulosin  0.4 mg Oral Daily     apixaban ANTICOAGULANT  2.5 mg Oral BID     famotidine  10 mg Oral BID       Data       Recent Labs  Lab 03/23/18  0515 03/23/18  0415 03/23/18  0210 03/22/18  1000 03/21/18  0600   WBC 12.4* 12.1*  --   --  7.3   HGB 9.7* 10.2*  --   --  9.8*   MCV 97 97  --   --  97    189  --   --  230   NA  --   --  135 136 138   POTASSIUM  --   --  4.3 4.4 4.1   CHLORIDE  --   --  102 104 105   CO2  --   --  27 27 29   BUN  --   --  36* 30 29   CR  --   --  0.81 0.71 0.67   ANIONGAP  --   --  6 5 4   TOBI  --   --  7.6* 7.7* 7.6*   GLC  --   --  115* 128* 107*       No results found for this or any previous visit (from the past 24 hour(s)).

## 2018-03-26 NOTE — PROGRESS NOTES
" explained that [facility] would not be able to meet with patient's daughter until Monday. Was asked to give this information to daughter and check if she would agree to waiting until Monday or did she want us to check with another agency for an earlier time. Called daughter and she began to cry as I started talking about hospice. She said she had just had a conversation with her father and he was much more awake and not confused. He asked her if he could come to her house for dinner and have a cold beer. As she continued to cry she kept saying, \"I hope I made the right decision with changing him to comfort care.\"  \"Did they pull the feeding tube yet because now I want to keep it in and not starve him. I need to talk with my sister to see if we are doing the right thing.\" I reassured her the feeding tube had not been pulled and she didn't need to rush this decision. She could further talk with Dr Aguillon further for any questions. I explained we could keep the hospice appointment for Monday for an informational meeting just in case at some point the information would be useful. She agreed and wanted to continue cares with her father until she was able to sort this all out. I explained that Dr Aguillon would be calling her. Soledad the bedside nurse was made aware of the phone call and Dr Aguillon would give her further direction. I explained to keep feeding tube in for now until Dr Aguillon speaks with the daughter. Will contiue to follow and keep hospice meeting for Monday.   "

## 2018-03-26 NOTE — PROGRESS NOTES
Called and spoke to daughter Prema over the phone about hospice services. Prema wants pt to go to TCU for rehab at this time, and to see how he does.  Briefly discussed hospice benefit and provided 24 hr hospice number if she has any other questions.  Discussed plan of care with STEVE Hardy.

## 2018-03-26 NOTE — PLAN OF CARE
Problem: Patient Care Overview  Goal: Plan of Care/Patient Progress Review  Outcome: Improving  Pt A&Ox2, VSS, long arm sitter. Foam dressings CDI on L arm. Slept most of night. Does not use call light appropriately, attempts to get out of bed quickly. Can be redirected. Comfort cares d/c. Ambulates to BR with assist of 1 & walker/gait belt. Puree diet with honey thick liquids. Discharge orders tbd.

## 2018-03-26 NOTE — PROGRESS NOTES
"CLINICAL NUTRITION SERVICES - REASSESSMENT NOTE      Malnutrition: (3/6)  % Weight Loss:  Unable to determine without recent history  % Intake:  </= 50% for >/= 5 days (severe malnutrition)  Subcutaneous Fat Loss:  Orbital region moderate depletion and Upper arm region moderate depletion  Muscle Loss:  Temporal region severe depletion and Clavicle bone region severe depletion  Fluid Retention:  None noted      Malnutrition Diagnosis: Severe malnutrition  In Context of:  Acute illness or injury  Environmental or social circumstances        EVALUATION OF PROGRESS TOWARD GOALS   Diet:  DD1 w/HT liquids  Nutrition Support:  Previously with Isosource via NG tube, d/c on 3/23   Intake:  Poor-fair appetite, eating ~ 50% of small meals        NEW FINDINGS:  SLP recommends strict NPO however family elects to let pt have purees and HT liquids for comfort despite aspiration risk. TF d/c and NGT removed. Anticipated d/c to TCU to \"see how he does\" per hospice RN note.     Previous Goals:   Patient will tolerate new EN formula in the next 3-4 days.  Evaluation: Not met    Previous Nutrition Diagnosis:   No nutrition diagnosis identified at this time  Evaluation: Declining      CURRENT NUTRITION DIAGNOSIS  Inadequate protein-energy intake related to minimal PO/EN as evidenced by FT d/c and restrictive diet with unclear POC regarding nutrition.     INTERVENTIONS  Recommendations / Nutrition Prescription  PO rec's per SLP/pending goals of care and plan for TCU vs hospice. If continues to improve could consider Magic Cups between meals    Implementation  No interventions at this time    Goals  Supplements vs nutrition support within 48 hours (pending goals of care)      MONITORING AND EVALUATION:  Progress towards goals will be monitored and evaluated per protocol and Practice Guidelines      Priya Cisneros RD, LD  Clinical Dietitian   "

## 2018-03-26 NOTE — PLAN OF CARE
Problem: Patient Care Overview  Goal: Plan of Care/Patient Progress Review  SLP: Attempted to see pt for dysphagia tx, however pt sleeping and did not wake despite maximum encouragement. Per chart review and discussion with RN, pts family requesting PO despite aspiration risk. Previous ST recommendations for pureed textures and honey thick liquids for pleasure. Will follow up as appropriate pending pts goals of care re: ST involvement given pt and family choice to eat and drink despite risk for aspiration.

## 2018-03-26 NOTE — PLAN OF CARE
Problem: Patient Care Overview  Goal: Plan of Care/Patient Progress Review  Outcome: Improving  VSS. Clearer thoughts tonight, A + O x 1, ambulate to bathroom with x1, eating 50%, no pain, able to swallow meds, no IV fluids, wounds CDI.

## 2018-03-27 VITALS
SYSTOLIC BLOOD PRESSURE: 96 MMHG | OXYGEN SATURATION: 94 % | DIASTOLIC BLOOD PRESSURE: 49 MMHG | TEMPERATURE: 97.4 F | RESPIRATION RATE: 16 BRPM | HEART RATE: 57 BPM | HEIGHT: 69 IN | BODY MASS INDEX: 20.8 KG/M2 | WEIGHT: 140.43 LBS

## 2018-03-27 PROCEDURE — 99239 HOSP IP/OBS DSCHRG MGMT >30: CPT | Performed by: INTERNAL MEDICINE

## 2018-03-27 PROCEDURE — 40000257 ZZH STATISTIC CONSULT NO CHARGE VASC ACCESS

## 2018-03-27 PROCEDURE — 25000132 ZZH RX MED GY IP 250 OP 250 PS 637: Performed by: INTERNAL MEDICINE

## 2018-03-27 PROCEDURE — 40000239 ZZH STATISTIC VAT ROUNDS

## 2018-03-27 RX ORDER — QUETIAPINE FUMARATE 25 MG/1
25 TABLET, FILM COATED ORAL 2 TIMES DAILY PRN
Qty: 60 TABLET | DISCHARGE
Start: 2018-03-27

## 2018-03-27 RX ORDER — QUETIAPINE FUMARATE 25 MG/1
25 TABLET, FILM COATED ORAL 2 TIMES DAILY PRN
Status: DISCONTINUED | OUTPATIENT
Start: 2018-03-27 | End: 2018-03-27 | Stop reason: HOSPADM

## 2018-03-27 RX ADMIN — FAMOTIDINE 10 MG: 10 TABLET, FILM COATED ORAL at 08:15

## 2018-03-27 RX ADMIN — APIXABAN 5 MG: 5 TABLET, FILM COATED ORAL at 08:15

## 2018-03-27 NOTE — PLAN OF CARE
"Problem: Patient Care Overview  Goal: Plan of Care/Patient Progress Review  Speech Language Therapy Discharge Summary    Reason for therapy discharge:    Discharged to transitional care facility.    Progress towards therapy goal(s). See goals on Care Plan in [facility] electronic health record for goal details.  Goals not met.  Barriers to achieving goals:   discharge from facility.    Therapy recommendation(s):    Continued therapy is recommended.  Rationale/Recommendations:  Per SLP note, \"Per chart review and discussion with RN, pts family requesting PO despite aspiration risk. Previous ST recommendations for pureed textures and honey thick liquids for pleasure. Will follow up as appropriate pending pts goals of care re: ST involvement given pt and family choice to eat and drink despite risk for aspiration.\".        "

## 2018-03-27 NOTE — PLAN OF CARE
Problem: Patient Care Overview  Goal: Plan of Care/Patient Progress Review  Outcome: No Change  Patient alert to self only. BP soft, other VSS on RA. Denied pain. Incisions CDI. Incontinent at times. PICC in right arm. DD1, puree diet with honey thick liquids. Up SBA. Plan to discharge to TCU once bed available.

## 2018-03-27 NOTE — PROGRESS NOTES
[facility]    Hospitalist Progress Note    Date of Service (when I saw the patient): 03/27/2018    Assessment & Plan   Joseluis Falcon is an 85 year-old male with history of bilateral inguinal herniorrhaphy and psoriasis who presented to [facility] ED on 02/27/2018 with a 2-3 day history of nausea, vomiting and abdominal pain.  Workup revealed SBO, likely due to adhesions.  Failed nonoperative management for 6 days after which he was taken to the OR on 03/04/2018 and underwent exp lap, TISHA and partial small-bowel resection.  He has had a complicated postop course including extended intubation, pneumonia and prolonged ICU delirium. Hospitalist team resumed primary medical cares 3/20/18.       Small bowel obstruction secondary to adhesions, s/p exploratory laparoscopy with lysis of adhesions and small bowel resection, repair of small umbilical hernia 3/4/18  Presented with symptoms of nausea, vomiting and abdominal pain, found secondary to SBO which was in turn due to adhesions.  He failed nonoperative management and underwent above surgery.   - Post-op complicated by an ileus which has now resolved.    - Appreciate general surgery inputs; following peripherally  - Will plan for surgical follow-up at discharge now that plans for hospice/comfort measures have been reversed    Acute metabolic encephalopathy  Prolonged delirium secondary to pneumonia and ICU delirium.  - Remains oriented to self only, although much more alert, cooperative and following commands  - Discontinued IV lorazepam; ordered quetiapine PO PRN for agitation      Severe malnutrition  Patient did not receive nutrition for greater than 5 days, thus was initiated on TPN, which has since been weaned off.   - Family understanding of aspiration risks, and wants patient to attempt to eat orally  - Continue modified diet (DD1 with honey thickened liquids)     Acute hypercapnic respiratory failure  Required intubation for airway  protection for exploratory laparotomy, TISHA and partial small-bowel obstruction.  Able to extubate on 03/11 but reintubated on 03/12, and was ultimately able to extubate on 03/18.  CT chest with IV contrast on 03/06 was negative for PE, but revealed mild emphysema and fibrosis, (probably age-related).   - Currently off oxygen      Postoperative atrial fibrillation with RVR  Required amiodarone drip and rate controlling agent. Since converted to sinus rhythm. Electrophysiology followed patient initially. Initially plan was for comfort measures, however due to improvement in clinical condition, discussed with the daughter and she would prefer the patient to be on anticoagulation to prevent stroke risk  - Continue apixaban 5 mg PO BID      Aspiration pneumonia  Completed 7 day course of piperacillin-tazobactam IV.     Acute kidney injury  Peak creatinine 1.37. Likely prerenal, responded well to IV fluid hydration.   - Creatinine stable     Advanced care planning   Patient had prolonged encephalopathy and delirium and initially due to lack of progress the decision was made to pursue comfort measures on 3/23. Palliative care was also consulted and following.   - Planning for discharge to TCU and attempt at rehabilitation.     Cognitive impairment  No formal diagnoses made, however daughter notes he had been having more difficulty with short term memory for the months preceding hospitalization, and she had been planning to bring him in for evaluation prior to his acute illness.   - Plan for TCU on discharge  - Consider formal cognitive testing as outpatient to establish diagnosis    Pain Assessment:  Current Pain Score 3/27/2018   Patient currently in pain? denies   Pain score (0-10) -   Pain location -   Pain descriptors -   CPOT pain score -   Joseluis gan pain level was assessed and he currently denies pain.      DVT Prophylaxis: Enoxaparin (Lovenox) SQ  Code Status: DNR/DNI    Disposition: Expected discharge to TCU once  "placement found.     Raad Aguilar    Interval History   Overnight had one episode of agitation and received IV lorazepam due to hitting staff. Cooperative this morning, denies any complaints. Discussed with daughter at bedside.     -Data reviewed today: I reviewed all new labs and imaging results over the last 24 hours. I personally reviewed no images or EKG's today.    Physical Exam   Temp: 97.6  F (36.4  C) Temp src: Oral BP: 94/55 Pulse: 55 Heart Rate: 54 Resp: 16 SpO2: 98 % O2 Device: None (Room air)    Vitals:    03/18/18 0400 03/19/18 0400 03/20/18 0400   Weight: 63.8 kg (140 lb 10.5 oz) 63.4 kg (139 lb 12.4 oz) 63.7 kg (140 lb 6.9 oz)     Vital Signs with Ranges  Temp:  [97.6  F (36.4  C)-98.5  F (36.9  C)] 97.6  F (36.4  C)  Pulse:  [55-64] 55  Heart Rate:  [54-59] 54  Resp:  [16-18] 16  BP: ()/(44-60) 94/55  SpO2:  [94 %-98 %] 98 %  I/O last 3 completed shifts:  In: 120 [P.O.:120]  Out: -     Constitutional: Elderly male, NAD  Respiratory: Clear to auscultation bilaterally, good air movement bilaterally  Cardiovascular: RRR, no m/r/g. No peripheral edema.  GI: Soft, non-tender, non-distended. BS normoactive.   Skin/Integumen: Warm, dry  Neuro: Alert. Cooperative. Following commands. Oriented to self, oriented to \"hospital\" with some cues, not oriented to date or reason for hospitalization.     Medications       apixaban ANTICOAGULANT  5 mg Oral BID     famotidine  10 mg Oral BID       Data     Recent Labs  Lab 03/23/18  0515 03/23/18  0415 03/23/18  0210 03/22/18  1000 03/21/18  0600   WBC 12.4* 12.1*  --   --  7.3   HGB 9.7* 10.2*  --   --  9.8*   MCV 97 97  --   --  97    189  --   --  230   NA  --   --  135 136 138   POTASSIUM  --   --  4.3 4.4 4.1   CHLORIDE  --   --  102 104 105   CO2  --   --  27 27 29   BUN  --   --  36* 30 29   CR  --   --  0.81 0.71 0.67   ANIONGAP  --   --  6 5 4   TOBI  --   --  7.6* 7.7* 7.6*   GLC  --   --  115* 128* 107*       No results found for this or any " previous visit (from the past 24 hour(s)).

## 2018-03-27 NOTE — PROGRESS NOTES
Brief Palliative Progress Note.    Chart reviewed, pt with plans to d/c to TCU possibly today. Palliative Care team will sign off, do not hesitate to contact our team if future needs arise. Thank you.    Ethel FREEDMAN CNP  Pager: [phone]  Palliative Medicine  March 27, 2018

## 2018-03-27 NOTE — PLAN OF CARE
Problem: Patient Care Overview  Goal: Plan of Care/Patient Progress Review  Outcome: Improving  A/O x4. AVSS; soft BP. Pt denies pain. Discussed discharge instructions and medications with pt, verbalized understanding. Pt discharged to TCU with belongings. Transportation provided by daughter.

## 2018-03-27 NOTE — DISCHARGE SUMMARY
[facility]    Discharge Summary  Hospitalist    Date of Admission:  2/27/2018  Date of Discharge:  3/27/2018  Discharging Provider: Raad Aguilar  Date of Service (when I saw the patient): 03/27/18    Discharge Diagnoses   -Small bowel obstruction secondary to adhesions, s/p exploratory laparoscopy with lysis of adhesions and small bowel resection, repair of small umbilical hernia  -Acute metabolic encephalopathy  -Cognitive impairment  -Severe malnutrition  -Dysphagia  -Acute hypercapnic respiratory failure  -Postoperative atrial fibrillation with RVR  -Mild to moderate mitral regurgitation  -Aspiration pneumonia  -Acute kidney injury  -Advanced care planning     History of Present Illness   Joseluis Falcon is an 85 year old male who presented with nausea, vomiting and abdominal pain.     Hospital Course   Joseluis Falcon was admitted on 2/27/2018.  The following problems were addressed during his hospitalization:    Small bowel obstruction secondary to adhesions, s/p exploratory laparoscopy with lysis of adhesions and small bowel resection, repair of small umbilical hernia 3/4/18  Presented with symptoms of nausea, vomiting and abdominal pain, found secondary to SBO which was in turn due to adhesions.  He failed nonoperative management and underwent above surgery. Post-op course complicated by an ileus which has now resolved, in addition to respiratory failure, prolonged delirium and pneumonia as below. Follow up with general surgery for routine post-op follow-up as directed.      Acute metabolic encephalopathy  Prolonged delirium secondary to pneumonia and ICU delirium. Remains oriented to self only, although much more alert, cooperative and following commands than previous. Occurring in the context of cognitive impairment and possible dementia as below. Quetiapine PO PRN for agitation ordered for discharge.        Cognitive impairment  No formal diagnosis of dementia, however daughter notes he had  been having more difficulty with short term memory for the months preceding hospitalization, and she had been planning to bring him in for evaluation prior to his acute illness. Plan for TCU on discharge. Consider formal cognitive testing as outpatient to establish diagnosis.    Severe malnutrition  Dysphagia  Patient did not receive nutrition for greater than 5 days, thus was initiated on TPN, which has since been weaned off. Family understanding of aspiration risks, and wants patient to attempt to eat orally. Continue modified diet (DD1 with honey thickened liquids).       Acute hypercapnic respiratory failure  Required intubation for airway protection for exploratory laparotomy, TISHA and partial small-bowel obstruction.  Able to extubate on 03/11 but reintubated on 03/12, and was ultimately able to extubate on 03/18.  CT chest with IV contrast on 03/06 was negative for PE, but revealed mild emphysema and fibrosis, (probably age-related). Respiratory status stable at time of of discharge.       Post-operative atrial fibrillation with rapid ventricular response   Mild to moderate mitral regurgitation  Required amiodarone drip and rate controlling agent. Since converted to sinus rhythm. Electrophysiology followed patient initially. Initially plan was for comfort measures, however due to improvement in clinical condition, discussed with the daughter and she would prefer the patient to be on anticoagulation to prevent stroke risk. Discharged to continue apixaban.       Aspiration pneumonia  Completed 7 day course of piperacillin-tazobactam IV.      Acute kidney injury  Peak creatinine 1.37. Likely prerenal, responded well to IV fluid hydration.  Creatinine subsequently stable.      Advanced care planning   Patient had prolonged encephalopathy and delirium and initially due to lack of progress the decision was made to pursue comfort measures on 3/23. Palliative care was also consulted and followed during admission.  Although he did not return to previous baseline cognitive status, his level of alertness and cooperation improved to the point that family elected to withdraw comfort only measures and pursue discharge to TCU and attempt at rehabilitation. His daughter is aware that it is possible he does not return to his previous level of functioning (cognitively or physically). Can continue to address goals of care pending his progress at TCU.     Discharge Pain Plan  - Patient currently has NO PAIN and is not being prescribed pain medications on discharge.    Raad Aguilar    Significant Results and Procedures   Echocardiogram 3/6/18     The left ventricle is normal in size.  The visual ejection fraction is estimated at 65-70%.  Grade I or early diastolic dysfunction.  No regional wall motion abnormalities noted.  There is mild mitral annular calcification.  Mild mitral valve prolapse, posterior leaflet  There is mild to moderate (1-2+) mitral regurgitation.  The mitral regurgitant jet is eccentrically directed.  Normal left atrial size.  Right ventricle systolic pressure estimate normal    Pending Results   These results will be followed up by hospitalist   Unresulted Labs Ordered in the Past 30 Days of this Admission     Date and Time Order Name Status Description    3/23/2018 0356 Blood culture Preliminary     3/23/2018 0356 Blood culture Preliminary           Code Status   DNR / DNI       Primary Care Physician   Danny Diop    Physical Exam   Temp: 97.4  F (36.3  C) Temp src: Oral BP: 96/49 Pulse: 57 Heart Rate: 54 Resp: 16 SpO2: 94 % O2 Device: None (Room air)    Vitals:    03/18/18 0400 03/19/18 0400 03/20/18 0400   Weight: 63.8 kg (140 lb 10.5 oz) 63.4 kg (139 lb 12.4 oz) 63.7 kg (140 lb 6.9 oz)     Vital Signs with Ranges  Temp:  [97.4  F (36.3  C)-97.9  F (36.6  C)] 97.4  F (36.3  C)  Pulse:  [55-64] 57  Heart Rate:  [54-59] 54  Resp:  [16-18] 16  BP: ()/(44-60) 96/49  SpO2:  [94 %-98 %] 94 %  I/O last 3  "completed shifts:  In: 120 [P.O.:120]  Out: -     Constitutional:   Elderly male, NAD  Respiratory:   Clear to auscultation bilaterally, good air movement bilaterally  Cardiovascular: RRR, no m/r/g. No peripheral edema.  GI: Soft, non-tender, non-distended. BS normoactive.   Skin/Integumen: Warm, dry  Neuro: Alert. Cooperative. Following commands. Oriented to self, oriented to \"hospital\" with some cues, not oriented to date or reason for hospitalization.     Discharge Disposition   Discharged to transitional care unit   Condition at discharge: Stable    Consultations This Hospital Stay   SURGERY GENERAL IP CONSULT  SOCIAL WORK IP CONSULT  PHYSICAL THERAPY ADULT IP CONSULT  OCCUPATIONAL THERAPY ADULT IP CONSULT  NUTRITION SERVICES ADULT IP CONSULT  CARDIOLOGY IP CONSULT  VASCULAR ACCESS ADULT IP CONSULT  PHARMACY/NUTRITION TO START AND MANAGE TPN  PHARMACY TO DOSE HEPARIN  PHARMACY IP CONSULT  PHARMACY IP CONSULT  PHARMACY IP CONSULT  WOUND OSTOMY CONTINENCE NURSE  IP CONSULT  PHARMACY IP CONSULT  PHARMACY IP CONSULT  WOUND OSTOMY CONTINENCE NURSE  IP CONSULT  NUTRITION SERVICES ADULT IP CONSULT  PHARMACY IP CONSULT  PHARMACY TO DOSE HEPARIN  PHARMACY IP CONSULT  PHYSICAL THERAPY ADULT IP CONSULT  PALLIATIVE CARE ADULT IP CONSULT  SOCIAL WORK IP CONSULT  SWALLOW EVAL SPEECH PATH AT BEDSIDE IP CONSULT  OCCUPATIONAL THERAPY ADULT IP CONSULT  PHYSICAL THERAPY ADULT IP CONSULT  SPEECH LANGUAGE PATH ADULT IP CONSULT    Time Spent on this Encounter   IRaad, personally saw the patient today and spent greater than 30 minutes discharging this patient.    Discharge Orders     General info for SNF   Length of Stay Estimate: Short Term Care: Estimated # of Days <30  Condition at Discharge: Improving  Level of care:skilled   Rehabilitation Potential: Fair  Admission H&P remains valid and up-to-date: Yes  Recent Chemotherapy: N/A  Use Nursing Home Standing Orders: Yes     Mantoux instructions   Give two-step Mantoux " (PPD) Per Facility Policy Yes     Follow Up and recommended labs and tests   Follow up with Nursing home physician.  No follow up labs or test are needed. Follow up with general surgery as recommended.     Activity - Up with nursing assistance     DNR/DNI     Occupational Therapy Adult Consult   Evaluate and treat as clinically indicated.    Reason:  Physical deconditioning, cognitive impairment     Physical Therapy Adult Consult   Evaluate and treat as clinically indicated.    Reason:  Physical deconditioning     Speech Language Path Adult Consult   Evaluate and treat as clinically indicated.    Reason:  Dysphagia     Advance Diet as Tolerated   Follow this diet upon discharge: Dysphagia Diet Level 1: Pureed; Honey Thickened Liquids (pre-thickened or use instant food thickener) (no straws)       Discharge Medications   Current Discharge Medication List      START taking these medications    Details   apixaban ANTICOAGULANT (ELIQUIS) 5 MG tablet Take 1 tablet (5 mg) by mouth 2 times daily    Associated Diagnoses: Atrial fibrillation, unspecified type (H)      QUEtiapine (SEROQUEL) 25 MG tablet Take 1 tablet (25 mg) by mouth 2 times daily as needed (Agitation)  Qty: 60 tablet    Associated Diagnoses: Agitation         STOP taking these medications       IBUPROFEN PO Comments:   Reason for Stopping:         mometasone (ELOCON) 0.1 % cream Comments:   Reason for Stopping:             Allergies   Allergies   Allergen Reactions     Seasonal Allergies      Protonix [Pantoprazole] Rash     Data   Most Recent 3 CBC's:  Recent Labs   Lab Test  03/23/18   0515  03/23/18   0415  03/21/18   0600   WBC  12.4*  12.1*  7.3   HGB  9.7*  10.2*  9.8*   MCV  97  97  97   PLT  202  189  230      Most Recent 3 BMP's:  Recent Labs   Lab Test  03/23/18   0210  03/22/18   1000  03/21/18   0600   NA  135  136  138   POTASSIUM  4.3  4.4  4.1   CHLORIDE  102  104  105   CO2  27  27  29   BUN  36*  30  29   CR  0.81  0.71  0.67   ANIONGAP  6   5  4   TOBI  7.6*  7.7*  7.6*   GLC  115*  128*  107*     Most Recent 2 LFT's:  Recent Labs   Lab Test  03/19/18   0500  03/12/18   0220   AST  35  28   ALT  44  22   ALKPHOS  176*  61   BILITOTAL  0.4  0.7     Most Recent INR's and Anticoagulation Dosing History:  Anticoagulation Dose History     Recent Dosing and Labs Latest Ref Rng & Units 11/30/2015 2/27/2018 3/7/2018 3/12/2018 3/19/2018    INR 0.86 - 1.14 1.0 1.02 1.23(H) 1.16(H) 1.14        Most Recent 3 Troponin's:  Recent Labs   Lab Test  03/07/18   0515  03/06/18   0857  02/27/18   1219   TROPI  0.024  <0.015  <0.015     Most Recent Cholesterol Panel:  Recent Labs   Lab Test  03/19/18   0500   TRIG  34     Most Recent 6 Bacteria Isolates From Any Culture (See EPIC Reports for Culture Details):  Recent Labs   Lab Test  03/23/18   0515  03/23/18   0415   CULT  No growth after 4 days  No growth after 4 days     Most Recent TSH, T4 and A1c Labs:  Recent Labs   Lab Test  03/09/18   0429  03/06/18   0857   TSH   --   1.72   A1C  5.4   --      Results for orders placed or performed during the hospital encounter of 02/27/18   CT Abdomen Pelvis w Contrast    Narrative    CT ABDOMEN/PELVIS WITH CONTRAST February 27, 2018 1:30 PM     HISTORY: Abdominal pain, left sided, concern for diverticulitis.    TECHNIQUE: 83 mL Isovue 370 IV. Radiation dose for this scan was  reduced using automated exposure control, adjustment of the mA and/or  kV according to patient size, or iterative reconstruction technique.    COMPARISON: None.    FINDINGS: Sigmoid diverticula. No evidence of diverticulitis. There  are dilated loops of proximal small bowel and decompressed distal  loops. There are mid small bowel loops which have nonspecific wall  thickening; this could relate to inflammation, ischemia, or infection.  This could be the cause of the small bowel obstruction. Prostate  enlargement. Small amount of free fluid in the lower pelvis.  Unremarkable appendix. Emphysematous changes.  Multiple chronic  appearing spinal compression deformities. Nothing else acute is seen  in the upper abdominal organs.       Impression    IMPRESSION:  1. Proximal small bowel obstruction. This may well be related to  abnormal wall thickening of the mid small bowel loops, as above.  2. Additional findings discussed above.     ASTON LUCIANO MD   XR Abdomen 2 Views    Narrative    ABDOMEN TWO VIEWS 3/3/2018 6:15 AM     HISTORY: Recheck small bowel obstruction after Gastroview challenge.     COMPARISON: CT abdomen and pelvis 2/27/2018.      Impression    IMPRESSION: Two views of the abdomen are performed. Nasogastric tube  terminates below the left hemidiaphragm presumably in the stomach.  Oral contrast is visualized and extends through multiple loops of  mildly dilated small bowel. Scattered air-fluid levels are present on  the upright view. Follow-up imaging to assess for progression of oral  contrast as needed. Suggest follow-up imaging in 3 hours from the time  of this exam.    EDI NELSON MD   XR Abdomen 2 Views    Narrative    ABDOMEN TWO VIEWS 3/3/2018 10:29 AM     HISTORY: Recheck abdominal x-ray after Gastrografin challenge.      COMPARISON: Abdominal x-ray 3/3/2018 at 0607 hours.      Impression    IMPRESSION: Gastroview contrast is becoming less dense over time but  there appears to be some contrast in the proximal right colon in the  lateral x-ray. Air-fluid levels are again noted on the upright view  suggesting adynamic ileus rather than obstruction. Fibrotic right lung  base changes are noted. Nasogastric tube terminates below the left  hemidiaphragm.    EDI NELSON MD   XR Abdomen 2 Views    Narrative    ABDOMEN TWO VIEWS  3/4/2018 7:03 AM     HISTORY: Follow-up small bowel obstruction.     COMPARISON: Abdominal x-ray 3/3/2018.      Impression    IMPRESSION: There is increasing air distention of loops of small bowel  in the mid to upper abdomen. Distal small bowel is difficult to  visualize. Previously  noted Gastroview has dissipated and no longer  identified. Increasing air distention of the bowel could suggest early  changes of bowel obstruction.    EDI NELSON MD   CT Chest Pulmonary Embolism w Contrast    Narrative    CT CHEST WITH CONTRAST  3/6/2018 5:00 AM     HISTORY: Recent surgery. Tachypnea and tachycardia.    COMPARISON: 2/27/2018 - CT abdomen and pelvis.    TECHNIQUE: Following the uneventful administration of 63 mL Isovue-370  intravenous contrast, helical sections were acquired through the lungs  according to the pulmonary embolism protocol. Coronal reconstructions  were generated. Radiation dose for this scan was reduced using  automated exposure control, adjustment of the mA and/or kV according  to the patient's size, or iterative reconstruction technique.    FINDINGS: No visualized pulmonary embolus. The thoracic aorta is  normal in caliber. Atherosclerotic calcification in the thoracic aorta  and coronary arteries.    Mild emphysematous changes in the lungs. Mildly extensive irregular  interstitial opacities in the periphery of both lungs. Honeycombing is  present in the periphery of the lungs and associated with a few areas  of aforementioned interstitial thickening. A few small ill-defined  hazy opacities are present in bilateral lung bases, for example in the  central aspect of the right lower lobe (series 5 image 75). No pleural  or pericardial effusion. A few nonspecific borderline enlarged  mediastinal lymph nodes. An enteric drainage tube is present with  distal tip in the gastric body.    Scan through the upper abdomen is significant for partial  visualization of several loops of fluid-filled mildly dilated small  bowel in the upper abdomen, a small amount of perihepatic free fluid,  and a small amount of extraluminal gas in the upper abdomen.      Impression    IMPRESSION:  1. A few small ill-defined hazy opacities in bilateral lung bases are  nonspecific, but could represent  pneumonia.  2. Mildly extensive irregular interstitial opacities and some  associated honeycombing in the periphery of both lungs consistent with  fibrosis, possibly usual interstitial pneumonia.  3. No visualized pulmonary embolus.  4. Partial visualization of a few loops of fluid-filled mildly dilated  small bowel in the upper abdomen. A small bowel obstruction is  possible.  5. A small amount of extraluminal gas in the upper abdomen. Per  report, the patient had an exploratory laparoscopy with lysis of  adhesions on 3/4/2018, explaining this finding.    CHARBEL WEI MD   XR Chest Port 1 View    Narrative    XR CHEST PORT 1 VW 3/6/2018 1:10 PM    COMPARISON: CT earlier on the same day.    HISTORY: Verify PICC tip placement.      Impression    IMPRESSION: Right upper extremity PICC tip in the low SVC. Enteric  tube tip and sidehole project over the stomach. Peripherally  predominant interstitial opacities again seen in both lungs, likely  representing fibrotic change. No pneumothorax seen on either side.    ASIF KINNEY MD   XR Chest Port 1 View    Narrative    CHEST SINGLE VIEW PORTABLE  3/7/2018 5:54 AM     HISTORY: Endotracheal tube.    COMPARISON: 3/6/2018 at 1255 hours.    FINDINGS: Interval placement of an endotracheal tube with distal tip  in the mid trachea, approximately 4 cm proximal to the luly. An  enteric drainage tube is again present with distal tip not visualized,  but at least to the gastric cardia. A right upper extremity  peripherally inserted central catheter is present with distal catheter  tip in the superior vena cava near its junction with the right atrium.  A small region of patchy opacities in the periphery of the mid and  lower left lung is again noted. Atherosclerotic calcification in the  thoracic aorta.      Impression    IMPRESSION:  1. Interval placement of an endotracheal tube with distal tip in the  mid trachea.  2. No other significant interval change.    CHARBEL WEI MD    XR Chest Port 1 View    Narrative    CHEST ONE VIEW UPRIGHT 3/11/2018 8:40 AM     HISTORY: Lines and tubes, evaluate interval change in pneumonia.     COMPARISON: 3/7/2018      Impression    IMPRESSION: Tubes and lines are not significantly changed.  Interstitial changes in both lungs unchanged from previous. Left lower  lobe atelectasis and/or infiltrate unchanged.    LIBBY DEVRIES MD   XR Chest Port 1 View    Narrative    XR CHEST PORT 1 VIEW   3/12/2018 2:59 AM     INDICATION: ?pneumonia.    COMPARISON: 3/11/2018.      Impression    IMPRESSION: No significant change. Mild patchy opacity at the left  base may be pneumonia or atelectasis. Slight diffuse interstitial  prominence may be due to fibrosis. No new focal airspace disease.  Right PICC tip in SVC. Stable heart size.    MOHIT PICKARD MD   XR Chest Port 1 View    Narrative    XR CHEST PORT 1 VIEW   3/12/2018 4:25 AM     INDICATION: ETT placement.    COMPARISON: 3/12/2018 at 0239 hours.      Impression    IMPRESSION: New ETT with tip in the lower trachea, approximately 3.3  cm above the luly. Otherwise, no change from the recent film.    MOHIT PICKARD MD   XR Abdomen Port 1 View    Narrative    XR ABDOMEN PORT 1 VIEW   3/12/2018 4:25 AM     INDICATION: NG tube placement.    COMPARISON: 3/4/2018 plain film. CT 2/27/2018.      Impression    IMPRESSION: Again seen are multiple dilated small bowel loops  compatible with small bowel obstruction. A nasogastric tube tip is not  seen in the stomach. On the chest x-ray performed coincidentally,  there is partial visualization of a tube projecting over the neck and  upper chest which is probably the patient's coiled NG tube, which  needs to be repositioned.    MOHIT PICKARD MD   XR Abdomen Port 1 View    Narrative    ABDOMEN PORTABLE ONE VIEW 3/12/2018 3:05 PM     COMPARISON: Frontal view of the abdomen 3/12/2018.    HISTORY: Orogastric tube placement.       Impression    IMPRESSION: There has been  interval placement of orogastric tube with  its tip and sidehole in the stomach. Multiple dilated loops of small  bowel again noted. There is no evidence for free intraperitoneal air.    LAUREN ABEL MD   XR Chest Port 1 View    Narrative    CHEST PORTABLE ONE VIEW March 14, 2018 5:51 AM     HISTORY: Assess infiltrates.     COMPARISON: 3/12/2018.      Impression    IMPRESSION:   1. Enteric tube has been advanced with tip now below the diaphragm off  the field-of-view. ET tube and right PICC remain in place.  2. Patchy airspace opacity at the lateral left lung base appear  slightly increased since prior. Background of interstitial opacities  are not significantly changed. No significant pleural effusion or  pneumothorax.    ILENE UREÑA MD   XR Chest Port 1 View    Narrative    CHEST ONE VIEW SUPINE 3/16/2018 4:01 PM     HISTORY: Respiratory failure.    COMPARISON: March 14, 2018      Impression    IMPRESSION: Tubes and lines stable. Mild peripheral fibrosis similar  to previous. No consolidation.    LIBBY DEVRIES MD   XR Abdomen Port 1 View    Narrative    ABDOMEN PORTABLE ONE VIEW  3/16/2018 4:02 PM     HISTORY: FT placement.      COMPARISON: Abdominal x-ray 3/12/2018.      Impression    IMPRESSION: Nasogastric tube terminates in the distal stomach. Feeding  tube has been placed in the interval since prior exam and terminates  likely at the ligament of Treitz. Bowel gas pattern is unremarkable.  Dilated loops of small bowel seen on prior exam have improved  slightly. Fibrotic lung base changes are again noted.    EDI NELSON MD   XR Chest Port 1 View    Narrative    XR CHEST PORT 1 VW  3/23/2018 3:11 AM     HISTORY: Dyspnea.     COMPARISON: 3/16/2018.    FINDINGS: Upright portable chest. Right PICC is in place. Nasoenteric  feeding tube passes into the stomach. No pneumothorax. The heart size  is normal. The thoracic aorta is calcified and tortuous. There is  again prominent interstitial disease at the lung  bases and periphery  of the lungs which may be chronic. The right hemidiaphragm is  elevated.      Impression    IMPRESSION: No acute abnormality. Probable chronic fibrotic changes at  the lung bases and periphery of the lungs.    STACY PATHAK MD

## 2018-03-27 NOTE — PROGRESS NOTES
SW: STEVE followed up with Amina in admissions at [facility]. Amina is completing assessment and checking insurance and will call this writer back. Return call pending.    SW attempted returning call to patient's dtr Jonathon- [phone], line busy. SW will continue to attempt calling dtr.    SW to continue to follow and assist with discharge planning.    ADDENDUM: STEVE spoke to patient's dtr Jonathon who accepts bed offer at [facility] that is available today. Jonathon will return to hospital around 1445 to transport patient to TCU.    STEVE epaged MD with request for DC orders.    PAS pending.    ADDENDUM: STEVE faxed DC orders. PAS pending.    PAS-RR    D: Per DHS regulation, STEVE completed and submitted PAS-RR to [place] [employer] via the Senior [employer] Line.  PAS-RR confirmation # is : [id number]    I: STEVE spoke with daughter and they are aware a PAS-RR has been submitted.  STEVE reviewed with daughter that they may be contacted for a follow up appointment within 10 days of hospital discharge if their SNF stay is < 30 days.  Contact information for Senior [employer] Line was also provided.    A: Daughter verbalized understanding.    P: Further questions may be directed to [facility] LinkAge Line at #[phone], option #4 for PAS-RR staff.

## 2018-03-27 NOTE — PLAN OF CARE
Problem: Patient Care Overview  Goal: Plan of Care/Patient Progress Review  Outcome: No Change  VSS, pt denies pain up with SBA. Pt confused and agitated refusing 2000 vital signs and refusing bedtime pills. Pt hitting staff and not redirectable so 0.5mg of IV ativan was given.

## 2018-03-28 LAB — INTERPRETATION ECG - MUSE: NORMAL

## 2018-03-29 LAB
BACTERIA SPEC CULT: NO GROWTH
BACTERIA SPEC CULT: NO GROWTH
Lab: NORMAL
Lab: NORMAL
SPECIMEN SOURCE: NORMAL
SPECIMEN SOURCE: NORMAL

## 2023-02-04 NOTE — PROGRESS NOTES
Consult dictated.  Critically ill 85-year-old male with no previous cardiac history that I can gather. He developed rapid atrial fibrillation 2 days ago around the time of abdominal surgery. Then, earlier today he developed atrial flutter alternating with afib with RVR. Systolic blood pressure is 70s to 80s. He is currently on a diltiazem drip.    Plan  - Stop IV diltiazem  - Start IV amiodarone, slow bolus (due to hypotension) followed by continuous drip  - IV digoxin  - If atrial flutter continues until tomorrow consider cardioversion  - Communicate with general surgical service whether it is okay to use heparin intravenously (without bolus)  - Echocardiogram pending - but expected to be technically difficult because of tachycardia.  - Chest x-ray to evaluate for possible pulmonary edema    Discussed care with Dr. Juventino Lauren and the patient's nurse.   <-- Click to add NO pertinent Past Medical History

## 2024-01-17 NOTE — PROGRESS NOTES
[facility] Nurse Inpatient Skin Assessment     Initial Assessment of:   Bilateral arms, chest and buttocks/ gluteal cleft        Data:   Patient History:      per MD note(s):  84 yo male hx of GERD, barretts esophagus and skin cancer admitted  with small bowel obstruction. Initially managed conservatively however ultimately required operative investigation 3/4 where he underwent lysis of adhesions and partial small bowel resection.  His post op course was complicated by respiratory distress, agitation and SVT, atrial flutter for which he started on amiodarone via EP.  Additional imaging post op suggested continued bowel obstruction. RRT called yesterday for respiratory distress with CT Chest obtained 3/6 for respiratory distress negative for PE but mild emphysema , basilar fibrosis and possible pneumonia for he was started on zosyn.  TPN started 3/6      Earlier the evening of 3/6 nursing noted patient increased coarse breath sounds and desat, nasal trumpet placed for NT suctioned with transient improvement.  This AM was minimally arousable with desaturation not responsive to suctioning.  RRT called with patient found to be obstructing airway thus anesthesia called and patient intubated and transferred to the ICU.      On arrival to ICU patient sedate, hemodynamically appropriate, though not following commands.  VBG pre intubation demonstrated respiratory acidosis. NG output dark bilious - unchanged from prior per floor nurse.       Damian Assessment and sub scores:   Damian Score  Av.1  Min: 15  Max: 19    Positioning: Pillows,     Mattress:  Standard , Atmos Air mattress    Moisture Management:  Urinary Catheter    Catheter secured? Yes    Current Diet / Nutrition:           Active Diet Order      NPO for Medical/Clinical Reasons Except for: Meds        Labs:   Recent Labs   Lab Test  18   0515   ALBUMIN  2.1*   HGB  12.4*   INR  1.23*   WBC  7.7         Skin Assessment  Patient was called at Cell [phone] because patient no showed to their appointment.    Unable to reach patient, no voicemail available.    Per the patient the reason for the no show is PT NOT ACCEPTING CALLS.     "(location):   Chest, bilateral arms, buttocks /gluteal cleft  History:  Frail, fragile skin. Pt currently intubated  Chest with intact skin other than a small suture line in subclavian area.  Dressing in place.  Bilateral arms- several small skin tears noted on each arm around the elbow and into forearms.  Most of these without drainage, skin flaps in place, each no bigger than 1.5cm length.  2 on the right arm needing steri strips and left open to the air  Buttocks/ gluteal cleft- epidermis intact with scattered areas of pink, blanchable erythema           Intervention:     Patient's chart evaluated.      Assessed: chest, bilateral arms and buttocks/ gluteal cleft with RN    Orders  Written  Supplies  Reviewed, gathered and placed at bedside, discussed with RN    Discussed plan of care with Nurse          Assessment:       Fragile, dry skin with numerous areas of ecchymosis not related to pressure.   Numerous skin tears on arms in the elbow and forearm areas.  Small tears with flaps in place, no to very minimal drainage, leave all open to the air but pt to wear arm sleeves for protection when able.  No wounds on chest to address.   No pressure injury to coccyx, gluteal cleft area.  Will recommend good skin hygiene and PIP measures.         Plan:   Nursing to notify the Provider(s) and re-consult the [facility] Nurse if skin deteriorate(s).    General skin care: daily and prn  1. Clean with gentle soap and water, getting between toes, drying thoroughly  2.  Apply Sween 24 Cream from head to toes, not between toes and paying special attention to arms.  3.  Pt to wear brown arm sleeves (#705442) if does not cause pressure to IV sites, etc    Pressure INJURY Prevention Measures (PIP):  If pt is refusing to turn or reposition they must be educated on the  potential injury from not off loading pressure.  Then this \"educated refusal\" needs to be documented as an \"educated refusal to turn/ reposition\" and document if alert, " "etc.    1. Repositioning:  Pt must be repositioned for good skin health.  If pt refusing or this is not being done then the charge nurse, nurse manager and md need to be notified to help intervene and to know that there is an issue    Bed:  Reposition pt MINIMALLY every 1-2 hours in bed to relieve pressure and promote perfusion to tissue. Also try to position to get airflow to pt s backside, etc. If necessary consider use of Fluidized Positioner Pads ((963966 small/ 726250 med/ [id number] large) to help maintain pt in good offloading-position.               Use pillows in the lower back and upper thighs to support postioning but keep pillows off butt to minimize any pressure.    Chair:  When up to chair pt should not sit for longer than one hour total before either standing or returning to bed for at least 10 minutes, again to relieve pressure and promote perfusion to tissue.     o Pt should also sit on a chair cushion (#939599) when up to the chair.  o Do NOT use a donut to sit on.  This concentrates pressure in a smaller area and creates a higher potential for injury where the cushion is.   o When pt returns to bed he/she should be positioned on side  o Do not sit on a Z-Flow Pad.  This is not a chair cushion, it is for positioning  If pt is refusing to turn or reposition they must be educated on the  potential injury from not off loading pressure.  Then this \"educated refusal\" needs to be documented as an \"educated refusal to turn/ reposition\" and document if alert, etc.  2.  Patient's skin must be kept clean and dry- the areas are only clean when you see the base of the skin fold, especially the perineal area.  Moist, macerated tissue is more susceptible to injury.           Follow Incontinence Protocol found in [employer].  Dust with baby powder BID to help with chaffing, decrease warmth from friction and increase comfort.         NO BRIEFS WITH CATHETERS  3.  Follow the bed algorithm to consider a specialty " mattress  4.  If able keep head of bed below 30 degrees.   5.  Follow Damian Risk recommendations  6.  Be aware of the bony prominences!    Elevate heels at all times, consider using heel lift boots, Caden or Rooke    Apply skin prep to bony prominences such as elbows, heels, hips- and consider wearing long sleeves and/or pants at al times  7.  Remember to perform full skin inspection 2 x / day- unless ordered NOT to order an area skin must be assessed.  If not able to do a full skin inspection then document full inspection along with the exception of what was not assessed.       WOC Nurse will return: no need to continue to follow

## (undated) DEVICE — STPL RELOAD 80 X 3.8MM GIA8038L

## (undated) DEVICE — SUCTION CANISTER MEDIVAC LINER 3000ML W/LID 65651-530

## (undated) DEVICE — LINEN TOWEL PACK X5 5464

## (undated) DEVICE — ENDO TROCAR OPTICAL 05MM VERSAPORT PLUS W/FIX CAN ONB5STF

## (undated) DEVICE — ENDO TROCAR FIRST ENTRY KII FIOS Z-THRD 05X100MM CTF03

## (undated) DEVICE — NDL 19GA 1.5"

## (undated) DEVICE — PREP CHLORAPREP 26ML TINTED ORANGE  260815

## (undated) DEVICE — PACK MINOR SBA15MIFSE

## (undated) DEVICE — ESU GROUND PAD UNIVERSAL W/O CORD

## (undated) DEVICE — GLOVE PROTEXIS W/NEU-THERA 7.5  2D73TE75

## (undated) DEVICE — STPL 80 X 3.8MM GIA8038S

## (undated) DEVICE — SU MONOCRYL 4-0 PS-2 18" UND Y496G

## (undated) DEVICE — SU SILK 3-0 SH CR 8X18" C013D

## (undated) DEVICE — SU PROLENE 2-0 V-7 36" 8977H

## (undated) DEVICE — SPONGE RAY-TEC 4X8" 7318

## (undated) DEVICE — DECANTER VIAL 2006S

## (undated) DEVICE — SU PDS II 0 CTX 60" Z990G

## (undated) DEVICE — SOL NACL 0.9% IRRIG 1000ML BOTTLE 07138-09

## (undated) DEVICE — LIGHT HANDLE X2

## (undated) DEVICE — GLOVE GAMMEX DERMAPRENE ULTRA SZ 8 LF 8516

## (undated) DEVICE — PREP CHLORAPREP W/ORANGE TINT 10.5ML 260715

## (undated) DEVICE — SYR 10ML SLIP TIP W/O NDL

## (undated) DEVICE — ENDO CANNULA 05MM VERSAONE UNIVERSAL UNVCA5STF

## (undated) DEVICE — SU VICRYL 3-0 SH 27" J316H

## (undated) DEVICE — Device

## (undated) DEVICE — DRAIN PENROSE 0.50"X18" LATEX FREE GR203

## (undated) DEVICE — SOL NACL 0.9% INJ 1000ML BAG 2B1324X

## (undated) DEVICE — SU VICRYL 3-0 TIE 12X18" J904T

## (undated) DEVICE — DRAPE LAP W/ARMBOARD 29410

## (undated) DEVICE — ESU CORD MONOPOLAR 10'  E0510

## (undated) RX ORDER — LIDOCAINE HYDROCHLORIDE 10 MG/ML
INJECTION, SOLUTION EPIDURAL; INFILTRATION; INTRACAUDAL; PERINEURAL
Status: DISPENSED
Start: 2018-03-04

## (undated) RX ORDER — DEXAMETHASONE SODIUM PHOSPHATE 4 MG/ML
INJECTION, SOLUTION INTRA-ARTICULAR; INTRALESIONAL; INTRAMUSCULAR; INTRAVENOUS; SOFT TISSUE
Status: DISPENSED
Start: 2017-06-09

## (undated) RX ORDER — HYDROMORPHONE HYDROCHLORIDE 1 MG/ML
INJECTION, SOLUTION INTRAMUSCULAR; INTRAVENOUS; SUBCUTANEOUS
Status: DISPENSED
Start: 2018-03-04

## (undated) RX ORDER — BUPIVACAINE HYDROCHLORIDE AND EPINEPHRINE 5; 5 MG/ML; UG/ML
INJECTION, SOLUTION EPIDURAL; INTRACAUDAL; PERINEURAL
Status: DISPENSED
Start: 2017-06-09

## (undated) RX ORDER — CEFAZOLIN SODIUM 2 G/100ML
INJECTION, SOLUTION INTRAVENOUS
Status: DISPENSED
Start: 2017-06-09

## (undated) RX ORDER — FENTANYL CITRATE 50 UG/ML
INJECTION, SOLUTION INTRAMUSCULAR; INTRAVENOUS
Status: DISPENSED
Start: 2018-03-04

## (undated) RX ORDER — ONDANSETRON 2 MG/ML
INJECTION INTRAMUSCULAR; INTRAVENOUS
Status: DISPENSED
Start: 2017-06-09

## (undated) RX ORDER — EPINEPHRINE 1 MG/ML
INJECTION, SOLUTION INTRAMUSCULAR; SUBCUTANEOUS
Status: DISPENSED
Start: 2018-03-04

## (undated) RX ORDER — PROPOFOL 10 MG/ML
INJECTION, EMULSION INTRAVENOUS
Status: DISPENSED
Start: 2018-03-04

## (undated) RX ORDER — ALBUMIN, HUMAN INJ 5% 5 %
SOLUTION INTRAVENOUS
Status: DISPENSED
Start: 2018-03-04

## (undated) RX ORDER — BUPIVACAINE HYDROCHLORIDE 2.5 MG/ML
INJECTION, SOLUTION EPIDURAL; INFILTRATION; INTRACAUDAL
Status: DISPENSED
Start: 2018-03-04

## (undated) RX ORDER — FENTANYL CITRATE 50 UG/ML
INJECTION, SOLUTION INTRAMUSCULAR; INTRAVENOUS
Status: DISPENSED
Start: 2017-06-09

## (undated) RX ORDER — PROPOFOL 10 MG/ML
INJECTION, EMULSION INTRAVENOUS
Status: DISPENSED
Start: 2017-06-09

## (undated) RX ORDER — OXYCODONE HYDROCHLORIDE 5 MG/1
TABLET ORAL
Status: DISPENSED
Start: 2017-06-09

## (undated) RX ORDER — ESMOLOL HYDROCHLORIDE 10 MG/ML
INJECTION INTRAVENOUS
Status: DISPENSED
Start: 2018-03-04